# Patient Record
Sex: FEMALE | Race: WHITE | NOT HISPANIC OR LATINO | Employment: FULL TIME | URBAN - METROPOLITAN AREA
[De-identification: names, ages, dates, MRNs, and addresses within clinical notes are randomized per-mention and may not be internally consistent; named-entity substitution may affect disease eponyms.]

---

## 2017-01-01 ENCOUNTER — MYC REFILL (OUTPATIENT)
Dept: INTERNAL MEDICINE | Facility: CLINIC | Age: 42
End: 2017-01-01

## 2017-01-01 DIAGNOSIS — J30.9 ALLERGIC RHINITIS, UNSPECIFIED ALLERGIC RHINITIS TYPE: ICD-10-CM

## 2017-01-01 DIAGNOSIS — K21.9 ESOPHAGEAL REFLUX: ICD-10-CM

## 2017-01-03 NOTE — TELEPHONE ENCOUNTER
Message from HerbMorris:  Tina Dutta, RN Tue Candido 3, 2017 8:01 AM        ----- Message -----   From: Kim Solomon   Sent: 1/1/2017 12:40 PM   To: [facility] Nursing Staff-  Subject: Medication Renewal Request     Original authorizing provider: MD Kim Mauricio would like a refill of the following medications:  beclomethasone (QVAR) 80 MCG/ACT Inhaler [Carolina Milton MD]    Preferred pharmacy: [facility] [id number]    Comment:      Medication renewals requested in this message routed to other providers:  omeprazole (PRILOSEC) 20 MG capsule [Cookie Donnelly MD]

## 2017-01-03 NOTE — TELEPHONE ENCOUNTER
Message from [place]:  Original authorizing provider: Cookie Donnelly MD    Kim Solomon would like a refill of the following medications:  omeprazole (PRILOSEC) 20 MG capsule [Cookie Donnelly MD]    Preferred pharmacy: [facility] [address] [place], [place] - [address] [facility]    Comment:      Medication renewals requested in this message routed to other providers:  beclomethasone (QVAR) 80 MCG/ACT Inhaler [Carolina Milton MD]

## 2017-01-04 ENCOUNTER — TELEPHONE (OUTPATIENT)
Dept: INTERNAL MEDICINE | Facility: CLINIC | Age: 42
End: 2017-01-04

## 2017-01-09 NOTE — TELEPHONE ENCOUNTER
[facility] Prior Authorization Team   Phone: [phone]  Fax: [phone]      PA Initiation    Medication: beclomethasone (QVAR) 80 MCG/ACT Inhaler-  Insurance Company: [employer]   Fax Number: [phone]    Phone Number: [phone]  Pharmacy Filling the Rx: [id number] - [place], [place] - [address] [facility]  Filling Pharmacy Phone: [phone]  Filling Pharmacy Fax: [phone]  Start Date: 1/9/2017

## 2017-01-11 ENCOUNTER — TELEPHONE (OUTPATIENT)
Dept: INTERNAL MEDICINE | Facility: CLINIC | Age: 42
End: 2017-01-11

## 2017-01-11 DIAGNOSIS — K21.9 ESOPHAGEAL REFLUX: Primary | ICD-10-CM

## 2017-01-11 NOTE — TELEPHONE ENCOUNTER
----- Message from Corina Jaeger sent at 1/7/2017 12:30 PM CST -----  Pardeep Silveira!  We received a request to do a PA for pt's omeprazole 20 mg twice daily.  Is pt able to take 40 mg once daily?  Looks like her insurance will only pay for one pill a day.  If she needs to take the twice daily dosing we will need a clinical reason.  Thank you so much for your help!  Corina

## 2017-01-12 RX ORDER — OMEPRAZOLE 40 MG/1
40 CAPSULE, DELAYED RELEASE ORAL DAILY
Qty: 90 CAPSULE | Refills: 3 | Status: SHIPPED | OUTPATIENT
Start: 2017-01-12 | End: 2017-12-21

## 2017-01-17 NOTE — TELEPHONE ENCOUNTER
Prior Authorization Approval    Authorization Effective Date: 1/16/2017  Authorization Expiration Date:    Medication: beclomethasone (QVAR) 80 MCG/ACT Inhaler - APPROVED  Approved Dose/Quantity:   Reference #:     Insurance Company: [employer]   Expected CoPay: $44.00     CoPay Card Available:      Foundation Assistance Needed:    Which Pharmacy is filling the prescription (Not needed for infusion/clinic administered): [id number] [place], [place] - [address] [place] [address]    Pharmacy notified, left voice message for patient.

## 2017-01-22 ASSESSMENT — ACTIVITIES OF DAILY LIVING (ADL)
ARE_THERE_SMOKE_DETECTORS_IN_YOUR_HOME?: Y
DO_MEMBERS_OF_YOUR_HOUSEHOLD_USE_SUNSCREEN?: Y
DO_MEMBERS_OF_YOUR_HOUSEHOLD_USE_SAFETY_HELMETS?: N
DO_MEMBERS_OF_YOUR_HOUSEHOLD_WEAR_SEAT_BELTS?: Y
ARE_THERE_FIREARMS_IN_YOUR_HOME?: Y
ARE_THERE_CARBON_MONOXIDE_DETECTORS_IN_YOUR_HOME?: Y

## 2017-01-22 ASSESSMENT — ENCOUNTER SYMPTOMS
MUSCLE CRAMPS: 0
NECK PAIN: 0
MYALGIAS: 0
BACK PAIN: 0
ARTHRALGIAS: 1
STIFFNESS: 1
NAIL CHANGES: 0
JOINT SWELLING: 0
SKIN CHANGES: 0
POOR WOUND HEALING: 0
MUSCLE WEAKNESS: 1

## 2017-01-23 ENCOUNTER — OFFICE VISIT (OUTPATIENT)
Dept: INTERNAL MEDICINE | Facility: CLINIC | Age: 42
End: 2017-01-23

## 2017-01-23 VITALS
SYSTOLIC BLOOD PRESSURE: 126 MMHG | BODY MASS INDEX: 35.05 KG/M2 | DIASTOLIC BLOOD PRESSURE: 83 MMHG | HEART RATE: 65 BPM | TEMPERATURE: 98.2 F | HEIGHT: 66 IN | OXYGEN SATURATION: 98 % | WEIGHT: 218.1 LBS

## 2017-01-23 DIAGNOSIS — R73.01 IMPAIRED FASTING GLUCOSE: ICD-10-CM

## 2017-01-23 DIAGNOSIS — Z77.018 HEAVY METAL EXPOSURE: ICD-10-CM

## 2017-01-23 DIAGNOSIS — M77.11 LATERAL EPICONDYLITIS OF RIGHT ELBOW: ICD-10-CM

## 2017-01-23 DIAGNOSIS — E78.2 MIXED HYPERLIPIDEMIA: ICD-10-CM

## 2017-01-23 DIAGNOSIS — J45.30 MILD PERSISTENT ASTHMA WITHOUT COMPLICATION: ICD-10-CM

## 2017-01-23 DIAGNOSIS — Z00.00 ROUTINE GENERAL MEDICAL EXAMINATION AT A HEALTH CARE FACILITY: Primary | ICD-10-CM

## 2017-01-23 DIAGNOSIS — Z00.00 ROUTINE GENERAL MEDICAL EXAMINATION AT A HEALTH CARE FACILITY: ICD-10-CM

## 2017-01-23 LAB
CHOLEST SERPL-MCNC: 203 MG/DL
HBA1C MFR BLD: 5.4 % (ref 4.3–6)
HDLC SERPL-MCNC: 44 MG/DL
LDLC SERPL CALC-MCNC: 131 MG/DL
NONHDLC SERPL-MCNC: 158 MG/DL
TRIGL SERPL-MCNC: 138 MG/DL

## 2017-01-23 ASSESSMENT — PAIN SCALES - GENERAL: PAINLEVEL: SEVERE PAIN (6)

## 2017-01-23 NOTE — NURSING NOTE
Chief Complaint   Patient presents with     Physical     Patient here for annual physical and elbow pain     Germania Alvarez LPN at 7:27 AM on 1/23/2017.

## 2017-01-23 NOTE — PATIENT INSTRUCTIONS
[facility] Medication Refill Request Information:  * Please contact your pharmacy regarding ANY request for medication refills.  ** [facility] Prescription Fax = [phone]  * Please allow 3 business days for routine medication refills.  * Please allow 5 business days for controlled substance medication refills.     [facility] Test Result notification information:  *You will be notified with in 7-10 days of your appointment day regarding the results of your test.  If you are on MyChart you will be notified as soon as the provider has reviewed the results and signed off on them.

## 2017-01-23 NOTE — PROGRESS NOTES
Ms. Solomon is a 41 year old female here for a physical.    History of Present Illness:  Kim is generally well.  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  She is surprised that she likes it more than anticipated.  She did initially lose some weight last year but then regained some back when she fell off her diet.  She did also see the sleep doctor and had a madibular advancment device made which she finds helpful for both SAADIA and TMJ disorder.  Her PSG showed AHI 5.6 in 10/15.  She had a couple of orthopedic issues over the summer. Broke great toe over summer after stubbing it, saw Tria, had xrays, had to keep it protected, it is now feeling better.   Also end of December R elbow started bothering her, she saw Tria in January, felt she had tennis elbow, started wearing a brace.  She did slip on pool deck in December during scuba class.  No other clear triggers. In November joined [employer], walking only, no weights or other arm activities.  Has done icing and advil.  She did not have xrays.  She does have weakness and possible tingling in 1/5th digits.  Mood pretty good, still on zoloft and vyvanse.  Still taking asthma meds, stopped allergy shots.  She rarely has wheezing. Uses albuterol, rarely, before exercise.    Gyn, last pap in 2016, normal, no abnormal, does them at  [facility].    She is wondering if she can get heavy metals tested.  She apparently [place] near [place], which is cited for air polution/volatiles.  She denies new/concerning symptoms.      A full 10-pt Review of Systems was performed and is negative except as indicated in the HPI.    Answers for HPI/ROS submitted by the patient on 1/22/2017   Annual Exam:  Frequency of exercise:: 2-3 days/week  Duration of exercise:: 15-30 minutes  General Symptoms: No  Skin  Symptoms: Yes  HENT Symptoms: No  EYE SYMPTOMS: No  HEART SYMPTOMS: No  LUNG SYMPTOMS: No  INTESTINAL SYMPTOMS: No  URINARY SYMPTOMS: No  GYNECOLOGIC SYMPTOMS: No  BREAST SYMPTOMS: No  SKELETAL SYMPTOMS: Yes  BLOOD SYMPTOMS: No  NERVOUS SYSTEM SYMPTOMS: No  MENTAL HEALTH SYMPTOMS: No  Changes in hair: No  Changes in moles/birth marks: No  Itching: No  Rashes: No  Changes in nails: No  Acne: Yes  Hair in places you don't want it: No  Change in facial hair: No  Warts: No  Non-healing sores: No  Scarring: No  Flaking of skin: No  Color changes of hands/feet in cold : No  Sun sensitivity: No  Skin thickening: No  Back pain: No  Muscle aches: No  Neck pain: No  Swollen joints: No  Joint pain: Yes  Bone pain: No  Muscle cramps: No  Muscle weakness: Yes  Joint stiffness: Yes  Bone fracture: No      Past Medical History:  Past Medical History   Diagnosis Date     GERD (gastroesophageal reflux disease)      Asthma      Depression      ADD (attention deficit disorder)      on [facility]se, [facility]     SAADIA (obstructive sleep apnea) 10/15     mild, AHI  5.5     Obesity        Past Surgical History:  Past Surgical History   Procedure Laterality Date     Eye surgery       Endoscopic sinus surgery  6/21/2011     Procedure:ENDOSCOPIC SINUS SURGERY; Bilateral Endoscopic Sinus Surgery Anterior Ethmoidectomy; Surgeon:JOSELUIS MISHRA; [facility]     Antrostomy nasal  6/21/2011     Procedure:ANTROSTOMY NASAL; Bilateral Maxillary  Antrostomy; Surgeon:JOSELUIS MISHRA; [facility]     Laparoscopic cholecystectomy with cholangiograms  9/10/2012     Procedure: LAPAROSCOPIC CHOLECYSTECTOMY WITH CHOLANGIOGRAMS;  Laparoscopic Cholecystectomy,  ;  Surgeon: Juancho Jmi MD;  Location: [facility] OR       Active Meds:  Current Outpatient Prescriptions   Medication     omeprazole (PRILOSEC) 40 MG capsule     sertraline (ZOLOFT) 100 MG tablet     desonide (DESOWEN) 0.05 % cream     beclomethasone (QVAR) 80  "MCG/ACT Inhaler     azelastine (ASTELIN) 0.1 % nasal spray     EPINEPHrine (EPIPEN JR) 0.15 MG/0.3ML injection     Multiple Vitamin (MULTIVITAMIN) per tablet     lisdexamfetamine (VYVANSE) 20 MG capsule     cetirizine (ZYRTEC) 10 MG tablet     albuterol 90 MCG/ACT inhaler     montelukast (SINGULAIR) 10 MG tablet     No current facility-administered medications for this visit.        Allergies:  Diphtheria toxoid; Medrol; Orange fruit; Peanuts; Penicillins; and Prednisone    Family History:  family history includes Breast Cancer in her maternal grandmother; C.A.D. (age of onset: 69) in her mother; Heart Failure in her maternal grandmother; Hypertension in her mother; Parkinsonism in her father; Rheumatoid Arthritis in her mother.    Social History:  Social History   Substance Use Topics     Smoking status: Never Smoker      Smokeless tobacco: Never Used     Alcohol Use: No      Comment: occasionally       Physical Exam:  Vitals: /83 mmHg  Pulse 65  Temp(Src) 98.2  F (36.8  C) (Oral)  Ht 1.682 m (5' 6.22\")  Wt 98.93 kg (218 lb 1.6 oz)  BMI 34.97 kg/m2  SpO2 98%  Breastfeeding? No  Constitutional: Alert, oriented, pleasant, no acute distress, well-appearing  Head: Normocephalic, atraumatic  Eyes: Extra-ocular movements intact, pupils equally round and reactive bilaterally, no scleral icterus  ENT: Oropharynx clear, moist mucus membranes, good dentition, no tonsillar enlargement   Neck: Supple, no lymphadenopathy, no thyromegaly/nodules  Cardiovascular: Regular rate and rhythm, no murmurs, rubs or gallops, peripheral pulses full/symmetric  Respiratory: Good air movement bilaterally, lungs clear, no wheezes/rales/rhonchi  GI: Abd obese, soft, non-tender  Breast/Pelvic: deferred b/c does at outside gyn  Musculoskeletal: No edema, normal muscle tone, normal gait, pain over lat epichondyle, pain with wrist extension against resistence      Assessment and Plan:  Kim was seen today for physical.    Diagnoses " and all orders for this visit:    Routine general medical examination at a health care facility  -     Normal exam today    Mixed hyperlipidemia  -     Lipid panel reflex to direct LDL; Future    Impaired fasting glucose  -     Hemoglobin A1c; Future    Mild persistent asthma without complication: Well controlled without regular symptoms, okay for scuba (letter signed)    Lateral epicondylitis of right elbow: Advised more regular use of nsaids, icing.  Advised may need to see Tria for injection, but will have to check with allergist about shots prior to this given reported steroid allergy.    Heavy metal exposure: No symptoms, low risk       -     Blood heavy metal panel          #Routine Health Maintenence:  Immunizations (zoster, pneumovax, flu, Tdap, Hep A/B): flu shot 2016, pt allergic to diptheria vaccine  Lipids:   Recent Labs   Lab Test 04/02/09 1037    CHOL 233*    HDL 54    *    TRIG 171*    CHOLHDLRATIO 4.3     Colonoscopy (50-75 yrs): n/a  Dexa (>65W or 70M yrs): n/a  Mammogram (40-75 yrs): n/a  Pap (21-65 yrs): 4/16, NIL per patient, does paps/breast exams at outside MD  Tob/EtOH: screen neg  Depression: see above    Return to clinic: prn or 1 year    Cookie Donnelly MD  Internal Medicine

## 2017-01-25 LAB
ARSENIC BLD-MCNC: NORMAL UG/L
LEAD BLD-MCNC: NORMAL UG/DL
MERCURY BLD-MCNC: NORMAL NG/ML

## 2017-02-17 ENCOUNTER — OFFICE VISIT (OUTPATIENT)
Dept: INTERNAL MEDICINE | Facility: CLINIC | Age: 42
End: 2017-02-17

## 2017-02-17 VITALS
RESPIRATION RATE: 18 BRPM | OXYGEN SATURATION: 96 % | DIASTOLIC BLOOD PRESSURE: 76 MMHG | BODY MASS INDEX: 34.31 KG/M2 | HEART RATE: 74 BPM | TEMPERATURE: 98.4 F | WEIGHT: 214 LBS | SYSTOLIC BLOOD PRESSURE: 114 MMHG

## 2017-02-17 DIAGNOSIS — J01.90 ACUTE BACTERIAL SINUSITIS: Primary | ICD-10-CM

## 2017-02-17 DIAGNOSIS — B96.89 ACUTE BACTERIAL SINUSITIS: Primary | ICD-10-CM

## 2017-02-17 RX ORDER — DOXYCYCLINE 100 MG/1
100 CAPSULE ORAL 2 TIMES DAILY
Qty: 28 CAPSULE | Refills: 0 | Status: SHIPPED | OUTPATIENT
Start: 2017-02-17 | End: 2017-06-27

## 2017-02-17 ASSESSMENT — PAIN SCALES - GENERAL: PAINLEVEL: MODERATE PAIN (4)

## 2017-02-17 NOTE — NURSING NOTE
Chief Complaint   Patient presents with     Sinus Problem     Here for sinus infection for a couple of weeks. Patient went to [facility] and patient was prescribed doxycycline     Porfirio Hyman CMA at 4:31 PM on 2/17/2017

## 2017-02-17 NOTE — MR AVS SNAPSHOT
After Visit Summary   2/17/2017    Kim Solomon    MRN: [id number]           Patient Information     Date Of Birth          1975        Visit Information        Provider Department      2/17/2017 4:30 PM Isabela Calderon MD [facility]        Today's Diagnoses     Acute bacterial sinusitis    -  1      Care Instructions    Primary Care Center Medication Refill Request Information:  * Please contact your pharmacy regarding ANY request for medication refills.  ** [facility] Prescription Fax = [phone]  * Please allow 3 business days for routine medication refills.  * Please allow 5 business days for controlled substance medication refills.     [facility] Center Test Result notification information:  *You will be notified with in 7-10 days of your appointment day regarding the results of your test.  If you are on MyChart you will be notified as soon as the provider has reviewed the results and signed off on them.    [facility] [phone]           Follow-ups after your visit        Who to contact     Please call your clinic at [phone] to:    Ask questions about your health    Make or cancel appointments    Discuss your medicines    Learn about your test results    Speak to your doctor   If you have compliments or concerns about an experience at your clinic, or if you wish to file a complaint, please contact [facility] Patient Relations at [phone] or email us at [email or web address]         Additional Information About Your Visit        MyChart Information     [employer] gives you secure access to your electronic health record. If you see a primary care provider, you can also send messages to your care team and make appointments. If you have questions, please call your primary care clinic.  If you do not have a primary care provider, please call [phone] and they will assist you.      [employer] is an electronic gateway that  provides easy, online access to your medical records. With [email or web address], you can request a clinic appointment, read your test results, renew a prescription or communicate with your care team.     To access your existing account, please contact your [facility] or call [phone] for assistance.        Care EveryWhere ID     This is your Care EveryWhere ID. This could be used by other organizations to access your [facility] medical records  [id number]        Your Vitals Were     Pulse Temperature Respirations Last Period Pulse Oximetry Breastfeeding?    74 98.4  F (36.9  C) (Oral) 18 02/07/2017 (Approximate) 96% No    BMI (Body Mass Index)                   34.31 kg/m2            Blood Pressure from Last 3 Encounters:   02/17/17 114/76   01/23/17 126/83   06/16/16 116/78    Weight from Last 3 Encounters:   02/17/17 97.1 kg (214 lb)   01/23/17 98.9 kg (218 lb 1.6 oz)   06/16/16 93.7 kg (206 lb 9.6 oz)              Today, you had the following     No orders found for display         Today's Medication Changes          These changes are accurate as of: 2/17/17  5:00 PM.  If you have any questions, ask your nurse or doctor.               Start taking these medicines.        Dose/Directions    doxycycline 100 MG capsule   Commonly known as:  VIBRAMYCIN   Used for:  Acute bacterial sinusitis        Dose:  100 mg   Take 1 capsule (100 mg) by mouth 2 times daily   Quantity:  28 capsule   Refills:  0            Where to get your medicines      These medications were sent to [facility] [id number] [place], [place] - [address] [facility]  [address] [facility] [address]     Phone:  [phone]     doxycycline 100 MG capsule                Primary Care Provider Office Phone # Fax #    Cookie Donnelly [phone]       [address] [place] [address]        Thank you!     Thank you for choosing [facility]  for your care.  Our goal is always to provide you with excellent care. Hearing back from our patients is one way we can continue to improve our services. Please take a few minutes to complete the written survey that you may receive in the mail after your visit with us. Thank you!             Your Updated Medication List - Protect others around you: Learn how to safely use, store and throw away your medicines at www.disposemymeds.org.          This list is accurate as of: 2/17/17  5:00 PM.  Always use your most recent med list.                   Brand Name Dispense Instructions for use    albuterol 90 MCG/ACT inhaler      Inhale 2 puffs into the lungs every 6 hours as needed.       azelastine 0.1 % spray    ASTELIN     Spray 2 sprays into both nostrils 2 times daily       beclomethasone 80 MCG/ACT Inhaler    QVAR    1 Inhaler    Inhale 2 puffs into the lungs 2 times daily       desonide 0.05 % cream    DESOWEN    60 g    Apply topically 2 times daily For max 7-10 days or shortest time to improvement       doxycycline 100 MG capsule    VIBRAMYCIN    28 capsule    Take 1 capsule (100 mg) by mouth 2 times daily       EPINEPHrine 0.15 MG/0.3ML injection    EPIPEN JR     Inject 0.15 mg into the muscle as needed.       lisdexamfetamine 20 MG capsule    VYVANSE    30 capsule    Take 1 capsule by mouth daily.       multivitamin per tablet      Take 1 tablet by mouth daily.       omeprazole 40 MG capsule    priLOSEC    90 capsule    Take 1 capsule (40 mg) by mouth daily       sertraline 100 MG tablet    ZOLOFT     Take 100 mg by mouth daily       SINGULAIR 10 MG tablet   Generic drug:  montelukast      Take 10 mg by mouth At Bedtime.       ZYRTEC 10 MG tablet   Generic drug:  cetirizine      Take 10 mg by mouth daily.

## 2017-02-17 NOTE — PROGRESS NOTES
Chief complaint: Sinus infection  History of present illness: Kim is a 41-year-old woman with a past medical history of allergic rhinitis, sinusitis, s/p 2 surgeries, and asthma who presents with concerns about acute bacterial sinusitis.    Around January 25 she developed a sore throat and then lost her voice.  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  Over the next several days she developed sinus pressure on her cheek bones, and rhinorrhea productive of lots of green and yellow mucus. She went to the [facility] clinic near her home where she was treated with doxycycline 100 mg twice daily for 7 days and had an improvement in her voice, sinus drainage and sinus pain. However over the last Squaxin the symptoms have recurred. She has not had a fever or chills, is not short of breath and her asthma is not flaring. She continues to use her Zyrtec daily for  Chronic allergies.    She has been planning to go to [place] in 2 weeks and she wants to be well.  Past Medical History   Diagnosis Date     ADD (attention deficit disorder)      on vyvanse, prescribed by Joselyn Jenkins at [facility]     Asthma      Depression      GERD (gastroesophageal reflux disease)      Obesity      SAADIA (obstructive sleep apnea) 10/15     mild, AHI  5.5, has mandibular adv device     Past Surgical History   Procedure Laterality Date     Eye surgery       Endoscopic sinus surgery  6/21/2011     Procedure:ENDOSCOPIC SINUS SURGERY; Bilateral Endoscopic Sinus Surgery Anterior Ethmoidectomy; Surgeon:JOSELUIS MISHRA; [facility]     Antrostomy nasal  6/21/2011     Procedure:ANTROSTOMY NASAL; Bilateral Maxillary  Antrostomy; Surgeon:JOSELUIS MISHRA; [facility]     Laparoscopic cholecystectomy with cholangiograms  9/10/2012     Procedure: LAPAROSCOPIC CHOLECYSTECTOMY WITH CHOLANGIOGRAMS;  Laparoscopic Cholecystectomy,  ;  Surgeon: Juancho Jim MD;   Location: [facility] OR      ROS: 4 point ROS neg other than the symptoms noted above in the HPI.    /76 (BP Location: Left arm, Patient Position: Chair, Cuff Size: Adult Large)  Pulse 74  Temp 98.4  F (36.9  C) (Oral)  Resp 18  Wt 97.1 kg (214 lb)  LMP 02/07/2017 (Approximate)  SpO2 96%  Breastfeeding? No  BMI 34.31 kg/m2  Exam:  Constitutional: healthy, alert and no distress  Neck: Neck supple. No adenopathy. Thyroid symmetric, normal size,, Carotids without bruits.  ENT: nasal mucosa is red and swollen bilaterally. There are no ulcerations. There is left serous otitis media. The right ear is normal. Posterior pharynx is mildly erythematous without exudate or ulcer. There is no cervical or submandibular lymphadenopathy.  The maxillary sinuses are tender to palpation.  Respiratory: negative, Percussion normal. Good diaphragmatic excursion. Lungs clear    ASSESSMENT  Recurrent bacterial sinusitis in a 41-year-old woman with a history of the same. She is predisposed by chronic allergies and despite having had sinus surgery, asymptomatic currently. There is no evidence of an asthma flare    Recommendations: Start doxycycline 100 mg p.o. Twice daily for 14 days and continue taking Singulair, Zyrtec, and p.r.n. Asthma meds.  Also continue taking treatment for GERD.

## 2017-02-17 NOTE — PATIENT INSTRUCTIONS
[facility] Medication Refill Request Information:  * Please contact your pharmacy regarding ANY request for medication refills.  ** [facility] Prescription Fax = [phone]  * Please allow 3 business days for routine medication refills.  * Please allow 5 business days for controlled substance medication refills.     [facility] Test Result notification information:  *You will be notified with in 7-10 days of your appointment day regarding the results of your test.  If you are on MyChart you will be notified as soon as the provider has reviewed the results and signed off on them.    [facility] [phone]

## 2017-05-25 ENCOUNTER — TELEPHONE (OUTPATIENT)
Dept: SLEEP MEDICINE | Facility: CLINIC | Age: 42
End: 2017-05-25

## 2017-06-27 ENCOUNTER — OFFICE VISIT (OUTPATIENT)
Dept: SLEEP MEDICINE | Facility: CLINIC | Age: 42
End: 2017-06-27
Attending: INTERNAL MEDICINE
Payer: COMMERCIAL

## 2017-06-27 VITALS
WEIGHT: 229 LBS | BODY MASS INDEX: 36.8 KG/M2 | SYSTOLIC BLOOD PRESSURE: 127 MMHG | RESPIRATION RATE: 18 BRPM | OXYGEN SATURATION: 98 % | HEIGHT: 66 IN | DIASTOLIC BLOOD PRESSURE: 74 MMHG | HEART RATE: 71 BPM

## 2017-06-27 DIAGNOSIS — G47.19 EXCESSIVE DAYTIME SLEEPINESS: ICD-10-CM

## 2017-06-27 DIAGNOSIS — G47.33 OSA (OBSTRUCTIVE SLEEP APNEA): Primary | ICD-10-CM

## 2017-06-27 DIAGNOSIS — E66.9 OBESITY (BMI 30-39.9): ICD-10-CM

## 2017-06-27 PROCEDURE — 99211 OFF/OP EST MAY X REQ PHY/QHP: CPT | Mod: ZF

## 2017-06-27 NOTE — PROGRESS NOTES
CC: MAD use was better with allergy shots, chief complaint: Patient returns after stopping using MAD for mild obstructive sleep apnea with a positional component.    History of present illness patient is a schoolteacher and has a history of allergies to multiple allergens, sleeps with several dogs, and does struggle with rhinitis. She had a polysomnogram on 10/14/15 which revealed an AHI of 5.5 events per hour. There was no significant hypoxemia. The AHI while supine was elevated at 8.2 events per hour she did have good results initially with her MAD developed by Terra Madrigal DDS  However,  possibly coinciding with stopping her allergy shots, she developed an intolerance and some snoring on her MAD. She presents here today to discuss other alternatives.    We reviewed alternative therapies for obstructive sleep apnea. We also discussed treatment of her allergies. At this point in time she wishes to consider positional therapy for 3 weeks and might chart me regarding these results. She also wishes to work on weight loss. She is aware of the fact that this is mild disease but this significantly affects her quality of sleep as well as her bed partners quality of sleep. She is exceptionally sleepy at this time with an [place] Sleepiness Scale of 20/24. I have concerns that there may be other factors that may be playing a part in this degree of sleepiness, and dependent upon her response to positional therapy, would recommend sleep diaries down the road to validate total sleep time and schedule. We did talk about other possible contributors to fatigue which can include stress depression as well as chronic illnesses such as allergies joint pain etc. Should she return for follow-up,  it would be reasonable to do a fatigue severity scale    Allergies:    Allergies   Allergen Reactions     Diphtheria Toxoid Hives     Medrol [Methylprednisolone Sodium Succinate] Hives     Per allergy testing. Allergic to all oral  steroids. OK with topical and inhaled.     Orange Fruit      Peanuts [Nuts]      All legumes and related oils     Penicillins Hives     Prednisone Hives       Medications:    Current Outpatient Prescriptions   Medication Sig Dispense Refill     fluticasone (VERAMYST) 27.5 MCG/SPRAY spray Spray 2 sprays into both nostrils daily       omeprazole (PRILOSEC) 40 MG capsule Take 1 capsule (40 mg) by mouth daily 90 capsule 3     sertraline (ZOLOFT) 100 MG tablet Take 100 mg by mouth daily       desonide (DESOWEN) 0.05 % cream Apply topically 2 times daily For max 7-10 days or shortest time to improvement 60 g 1     beclomethasone (QVAR) 80 MCG/ACT Inhaler Inhale 2 puffs into the lungs 2 times daily 1 Inhaler 11     EPINEPHrine (EPIPEN JR) 0.15 MG/0.3ML injection Inject 0.15 mg into the muscle as needed.         lisdexamfetamine (VYVANSE) 20 MG capsule Take 1 capsule by mouth daily. 30 capsule 0     cetirizine (ZYRTEC) 10 MG tablet Take 10 mg by mouth daily.       albuterol 90 MCG/ACT inhaler Inhale 2 puffs into the lungs every 6 hours as needed.       montelukast (SINGULAIR) 10 MG tablet Take 10 mg by mouth At Bedtime.         Problem List:  Patient Active Problem List    Diagnosis Date Noted     Abdominal pain, unspecified abdominal location 10/09/2012     Per radiology order  Problem list name updated by automated process. Provider to review       S/P laparoscopic cholecystectomy 09/10/2012     Iron deficiency anemia 03/01/2012     Problem list name updated by automated process. 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 Provider to review          Past Medical/Surgical History:  Past Medical History:   Diagnosis Date     ADD (attention deficit disorder)     on vyvanse, prescribed by Joselyn Jenkins at [facility] services  "    Asthma      Depression      GERD (gastroesophageal reflux disease)      Obesity      SAADIA (obstructive sleep apnea) 10/15    mild, AHI  5.5, has mandibular adv device     Past Surgical History:   Procedure Laterality Date     ANTROSTOMY NASAL  6/21/2011    Procedure:ANTROSTOMY NASAL; Bilateral Maxillary  Antrostomy; Surgeon:JOSELUIS MISHRA; Location:[facility] OR     ENDOSCOPIC SINUS SURGERY  6/21/2011    Procedure:ENDOSCOPIC SINUS SURGERY; Bilateral Endoscopic Sinus Surgery Anterior Ethmoidectomy; Surgeon:JOSELUIS MISHRA; Location:[facility] OR     EYE SURGERY       LAPAROSCOPIC CHOLECYSTECTOMY WITH CHOLANGIOGRAMS  9/10/2012    Procedure: LAPAROSCOPIC CHOLECYSTECTOMY WITH CHOLANGIOGRAMS;  Laparoscopic Cholecystectomy,  ;  Surgeon: Juancho Jim MD;  Location: [facility] OR           Physical Examination:  Vitals: /74  Pulse 71  Resp 18  Ht 1.676 m (5' 6\")  Wt 103.9 kg (229 lb)  SpO2 98%  BMI 36.96 kg/m2  BMI= Body mass index is 36.96 kg/(m^2).         [place] Total Score 6/27/2017   Total score - [place] 16       GENERAL APPEARANCE: healthy, alert and mild distress    Assessment plan is my impression that Ms. Solomon would benefit from positional therapy with a call back to me in about 3 weeks' time. In the following plan of care has been developed:  1 snoring with mild primarily positional sleep apnea positional device for 3 weeks, might chart follow-up, consideration for possible CPAP at that time.   2.  obesity increase exercise frequency and reduce intake and remember  3 excessive daytime sleepiness further evaluation of this needs to be determined as to whether this is fatigue or sleepiness. If this is fatigue relating to her allergies than an alternative to the previous plan as necessary. Also depending upon her symptoms CPAP mean be difficult to manage with allergies. We'll discuss these measures dependent upon her response to positional therapy and if necessary a fatigue severity scale, and sleep diaries would be " helpful    Thank you for allowing me to participate in this kind woman's care time spent with patient 25 minutes of which greater than 50% was spent in counseling and education and coordination of care and dictation

## 2017-06-27 NOTE — PATIENT INSTRUCTIONS
"MY TREATMENT INFORMATION FOR SLEEP APNEA-  Kim Solomon    NP: Eileen Barajas [place]  [facility] :  [place]    MY CONTACT NUMBER:  [phone]  Positional my chart me after 3 wks-how are things going you and bedpartner.  Next step?      Am I having a sleep study at a sleep center?  Make sure you have an appointment for the study before you leave!    Am I having a home sleep study?  Watch this video:  [email or web address]    Frequently asked questions:  1. What is Obstructive Sleep Apnea (SAADIA)? SAADIA is the most common type of sleep apnea. Apnea means, \"without breath.\"  Apnea is most often caused by narrowing or collapse of the upper airway as muscles relax during sleep.   Almost everyone has occasional apneas. Most people with sleep apnea have had brief interruptions at night frequently for many years.  The severity of sleep apnea is related to how frequent and severe the events are.   2. What are the consequences of SAADIA? Symptoms include: feeling sleepy during the day, snoring loudly, gasping or stopping of breathing, trouble sleeping, and occasionally morning headaches or heartburn at night.  Sleepiness can be serious and even increase the risk of falling asleep while driving. Other health consequences may include development of high blood pressure and other cardiovascular disease in persons who are susceptible. Untreated SAADIA  can contribute to heart disease, stroke and diabetes.   3. What are the treatment options? In most situations, sleep apnea is a lifelong disease that must be managed with daily therapy. Medications are not effective for sleep apnea and surgery is generally not considered until other therapies have been tried. Your treatment is your choice . Continuous Positive Airway (CPAP) works right away and is the therapy that is effective in nearly everyone. An oral device to hold your jaw forward is usually the next most reliable " option. Other options include postioning devices (to keep you off your back), weight loss, and surgery including a tongue pacing device. There is more detail about some of these options below.    Important tips for using CPAP and similar devices   Know your equipment:  CPAP is continuous positive airway pressure that prevents obstructive sleep apnea by keeping the throat from collapsing while you are sleeping. In most cases, the device is  smart  and can slowly self-adjusts if your throat collapses and keeps a record every day of how well you are treated-this information is available to you and your care team.  BPAP is bilevel positive airway pressure that keeps your throat open and also assists each breath with a pressure boost to maintain adequate breathing.  Special kinds of BPAP are used in patients who have inadequate breathing from lung or heart disease. In most cases, the device is  smart  and can slowly self-adjusts to assist breathing. Like CPAP, the device keeps a record of how well you are treated.  Your mask is your connection to the device. You get to choose what feels most comfortable and the staff will help to make sure if fits. Here: are some examples of the different masks that are available:       Key points to remember on your journey with sleep apnea:  1. Sleep study.  PAP devices often need to be adjusted during a sleep study to show that they are effective and adjusted right.  2. Good tips to remember: Try wearing just the mask during a quiet time during the day so your body adapts to wearing it. A humidifier is recommended for comfort in most cases to prevent drying of your nose and throat. Allergy medication from your provider may help you if you are having nasal congestion.  3. Getting settled-in. It takes more than one night for most of us to get used to wearing a mask. Try wearing just the mask during a quiet time during the day so your body adapts to wearing it. A humidifier is  recommended for comfort in most cases. Our team will work with you carefully on the first day and will be in contact within 4 days and again at 2 and 4 weeks for advice and remote device adjustments. Your therapy is evaluated by the device each day.   4. Use it every night. The more you are able to sleep naturally for 7-8 hours, the more likely you will have good sleep and to prevent health risks or symptoms from sleep apnea. Even if you use it 4 hours it helps. Occasionally all of us are unable to use a medical therapy, in sleep apnea, it is not dangerous to miss one night.   5. Communicate. Call our skilled team on the number provided on the first day if your visit for problems that make it difficult to wear the device. Over 2 out of 3 patients can learn to wear the device long-term with help from our team. Remember to call our team or your sleep providers if you are unable to wear the device as we may have other solutions for those who cannot adapt to mask CPAP therapy. It is recommended that you sleep your sleep provider within the first 3 months and yearly after that if you are not having problems.   Take care of your equipment. Make sure you clean your mask and tubing using directions every day and that your filter and mask are replaced as recommended or if they are not working.     BESIDES CPAP, WHAT OTHER THERAPIES ARE THERE?    Positioning Device  Positioning devices are generally used when sleep apnea is mild and only occurs on your back.This example shows a pillow that straps around the waist. It may be appropriate for those whose sleep study shows milder sleep apnea that occurs primarily when lying flat on one's back. Preliminary studies have shown benefit but effectiveness at home may need to be verified by a home sleep test. These devices are generally not covered by medical insurance..  Positioning Device  Positioning devices are generally used when sleep apnea is mild and only occurs on your  back.This example shows a pillow that straps around the waist. It may be appropriate for those whose sleep study shows milder sleep apnea that occurs primarily when lying flat on one's back. Preliminary studies have shown benefit but effectiveness at home may need to be verified by a home sleep test. These devices are generally not covered by medical insurance.            [employer] or [employer]bay-slumber snore bump for snoring; or backpack w/ chest strap stuffed w/ pillow or t shirt 2 pockets sew in tennis balls    Oral Appliance  What is oral appliance therapy?  An oral appliance device fits on your teeth at night like a retainer used after having braces. The device is made by a specialized dentist and requires several visits over 1-2 months before a manufactured device is made to fit your teeth and is adjusted to prevent your sleep apnea. Once an oral device is working properly, snoring should be improved. A home sleep test may be recommended at that time if to determine whether the sleep apnea is adequately treated.       Some things to remember:  -Oral devices are often, but not always, covered by your medical insurance. Be sure to check with your insurance provider.   -If you are referred for oral therapy, you will be given a list of specialized dentists to consider or you may choose to visit the Web site of the American Academy of Dental Sleep Medicine  -Oral devices are less likely to work if you have severe sleep apnea or are extremely overweight.     More detailed information  An oral appliance is a small acrylic device that fits over the upper and lower teeth  (similar to a retainer or a mouth guard). This device slightly moves jaw forward, which moves the base of the tongue forward, opens the airway, improves breathing for effective treat snoring and obstructive sleep apnea in perhaps 7 out of 10 people .  The best working devices are custom-made by a dental device  after a mold is made of the teeth 1,  2, 3.  When is an oral appliance indicated?  Oral appliance therapy is recommended as a first-line treatment for patients with primary snoring, mild sleep apnea, and for patients with moderate sleep apnea who prefer appliance therapy to use of CPAP4, 5. Severity of sleep apnea is determined by sleep testing and is based on the number of respiratory events per hour of sleep.   How successful is oral appliance therapy?  The success rate of oral appliance therapy in patients with mild sleep apnea is 75-80% while in patients with moderate sleep apnea it is 50-70%. The chance of success in patients with severe sleep apnea is 40-50%. The research also shows that oral appliances have a beneficial effect on the cardiovascular health of SAADIA patients at the same magnitude as CPAP therapy7.  Oral appliances should be a second-line treatment in cases of severe sleep apnea, but if not completely successful then a combination therapy utilizing CPAP plus oral appliance therapy may be effective. Oral appliances tend to be effective in a broad range of patients although studies show that the patients who have the highest success are females, younger patients, those with milder disease, and less severe obesity. 3, 6.   Finding a dentist that practices dental sleep medicine  Specific training is available through the American Academy of Dental Sleep Medicine for dentists interested in working in the field of sleep. To find a dentist who is educated in the field of sleep and the use of oral appliances, near you, visit the Web site of the American Academy of Dental Sleep Medicine.    References  1. Kiki, et al. Objectively measured vs self-reported compliance during oral appliance therapy for sleep-disordered breathing. Chest 2013; 144(5): 4569-0494.  2. Kaylee et al. Objective measurement of compliance during oral appliance therapy for sleep-disordered breathing. Thorax 2013; 68(1): 91-96.  3. Aaron et al. Mandibular  advancement devices in 620 men and women with SAADIA and snoring: tolerability and predictors of treatment success. Chest 2004; 125: 0872-3044.  4. Renetta et al. Oral appliances for snoring and SAADIA: a review. Sleep 2006; 29: 244-262.  5. Johana et al. Oral appliance treatment for SAADIA: an update. J Clin Sleep Med 2014; 10(2): 215-227.  6. Carlos et al. Predictors of OSAH treatment outcome. J Dent Res 2007; 86: 9847-9806.      Weight Loss:    Weight loss is a long-term strategy that may improve sleep apnea in some patients.    Weight management is a personal decision.  If you are interested in exploring weight loss strategies, the following discussion covers the impact on weight loss on sleep apnea and the approaches that may be successful.    Weight loss decreases severity of sleep apnea in most people with obesity. For those with mild obesity who have developed snoring with weight gain, even 15-30 pound weight loss can improve and occasionally eliminate sleep apnea.  Structured and life-long dietary and health habits are necessary to lose weight and keep healthier weight levels.     Though there may be significant health benefits from weight loss, long-term weight loss is very difficult to achieve- studies show success with dietary management in less than 10% of people. In addition, substantial weight loss may require years of dietary control and may be difficult if patients have severe obesity. In these cases, surgical management may be considered.  Finally, older individuals who have tolerated obesity without health complications may be less likely to benefit from weight loss strategies.      [unfilled]    Surgery:    Surgery for obstructive sleep apnea is considered generally only when other therapies fail to work. Surgery may be discussed with you if you are having a difficult time tolerating CPAP and or when there is an abnormal structure that requires surgical correction.  Nose and throat surgeries  often enlarge the airway to prevent collapse.  Most of these surgeries create pain for 1-2 weeks and up to half of the most common surgeries are not effective throughout life.  You should carefully discuss the benefits and drawbacks to surgery with your sleep provider and surgeon to determine if it is the best solution for you.   More information  Surgery for SAADIA is directed at areas that are responsible for narrowing or complete obstruction of the airway during sleep.  There are a wide range of procedures available to enlarge and/or stabilize the airway to prevent blockage of breathing in the three major areas where it can occur: the palate, tongue, and nasal regions.  Successful surgical treatment depends on the accurate identification of the factors responsible for obstructive sleep apnea in each person.  A personalized approach is required because there is no single treatment that works well for everyone.  Because of anatomic variation, consultation with an examination by a sleep surgeon is a critical first step in determining what surgical options are best for each patient.  In some cases, examination during sedation may be recommended in order to guide the selection of procedures.  Patients will be counseled about risks and benefits as well as the typical recovery course after surgery. Surgery is typically not a cure for a person s SAADIA.  However, surgery will often significantly improve one s SAADIA severity (termed  success rate ).  Even in the absence of a cure, surgery will decrease the cardiovascular risk associated with OSA7; improve overall quality of life8 (sleepiness, functionality, sleep quality, etc).      Palate Procedures:  Patients with SAADIA often have narrowing of their airway in the region of their tonsils and uvula.  The goals of palate procedures are to widen the airway in this region as well as to help the tissues resist collapse.  Modern palate procedure techniques focus on tissue conservation  and soft tissue rearrangement, rather than tissue removal.  Often the uvula is preserved in this procedure. Residual sleep apnea is common in patient after pharyngoplasty with an average reduction in sleep apnea events of 33%2.      Tongue Procedures:  ExamWhile patients are awake, the muscles that surround the throat are active and keep this region open for breathing. These muscles relax during sleep, allowing the tongue and other structures to collapse and block breathing.  There are several different tongue procedures available.  Selection of a tongue base procedure depends on characteristics seen on physical exam.  Generally, procedures are aimed at removing bulky tissues in this area or preventing the back of the tongue from falling back during sleep.  Success rates for tongue surgery range from 50-62%3.    Hypoglossal Nerve Stimulation:  Hypoglossal nerve stimulation has recently received approval from the [place] Food and Drug Administration for the treatment of obstructive sleep apnea.  This is based on research showing that the system was safe and effective in treating sleep apnea6.  Results showed that the median AHI score decreased 68%, from 29.3 to 9.0. This therapy uses an implant system that senses breathing patterns and delivers mild stimulation to airway muscles, which keeps the airway open during sleep.  The system consists of three fully implanted components: a small generator (similar in size to a pacemaker), a breathing sensor, and a stimulation lead.  Using a small handheld remote, a patient turns the therapy on before bed and off upon awakening.    Candidates for this device must be greater than 22 years of age, have moderate to severe SAADIA (AHI between 20-65), BMI less than 32, have tried CPAP/oral appliance without success, and have appropriate upper airway anatomy (determined by a sleep endoscopy performed by Dr. More).    Hypoglossal Nerve Stimulation Pathway:    The sleep surgeon s  office will work with the patient through the insurance prior-authorization process (including communications and appeals).    Nasal Procedures:  Nasal obstruction can interfere with nasal breathing during the day and night.  Studies have shown that relief of nasal obstruction can improve the ability of some patients to tolerate positive airway pressure therapy for obstructive sleep apnea1.  Treatment options include medications such as nasal saline, topical corticosteroid and antihistamine sprays, and oral medications such as antihistamines or decongestants. Non-surgical treatments can include external nasal dilators for selected patients. If these are not successful by themselves, surgery can improve the nasal airway either alone or in combination with these other options.      Combination Procedures:  Combination of surgical procedures and other treatments may be recommended, particularly if patients have more than one area of narrowing or persistent positional disease.  The success rate of combination surgery ranges from 66-80%2,3.    References  1. Sidney GILL. The Role of the Nose in Snoring and Obstructive Sleep Apnoea: An Update.  Eur Arch Otorhinolaryngol. 2011; 268: 1365-73.  2.  Len SM; Vicenta JA; Licha JR; Pallanch JF; Malika MB; Lindy SG; Thony DEMPSEY. Surgical modifications of the upper airway for obstructive sleep apnea in adults: a systematic review and meta-analysis. SLEEP 2010;33(10):0180-5252. Arelis HERNANDEZ. Hypopharyngeal surgery in obstructive sleep apnea: an evidence-based medicine review.  Arch Otolaryngol Head Neck Surg. 2006 Feb;132(2):206-13.  3. Tony YH1, Christian Y, Juan Jose KURTIS. The efficacy of anatomically based multilevel surgery for obstructive sleep apnea. Otolaryngol Head Neck Surg. 2003 Oct;129(4):327-35.  4. Arelis HERNANDEZ, Goldberg A. Hypopharyngeal Surgery in Obstructive Sleep Apnea: An Evidence-Based Medicine Review. Arch Otolaryngol Head Neck Surg. 2006 Feb;132(2):206-13.  5. Valentin JAMA  et al. Upper-Airway Stimulation for Obstructive Sleep Apnea.  N Engl J Med. 2014 Jan 9;370(2):139-49.  6. Josephine Y et al. Increased Incidence of Cardiovascular Disease in Middle-aged Men with Obstructive Sleep Apnea. Am J Respir Crit Care Med; 2002 166: 159-165  7. Vel KAMARA et al. Studying Life Effects and Effectiveness of Palatopharyngoplasty (SLEEP) study: Subjective Outcomes of Isolated Uvulopalatopharyngoplasty. Otolaryngol Head Neck Surg. 2011; 144: 623-631.    My chart response to step up in allergy tx and use of positional device

## 2017-06-27 NOTE — MR AVS SNAPSHOT
"              After Visit Summary   6/27/2017    Kim Solomon    MRN: [id number]           Patient Information     Date Of Birth          1975        Visit Information        Provider Department      6/27/2017 4:00 PM Eileen Carballo APRN [facility], [place], Sleep Study        Care Instructions    MY TREATMENT INFORMATION FOR SLEEP APNEA-  Kim Solomon    NP: Eileen Carballo  SLEEP CENTER :  [facility]    MY CONTACT NUMBER:  [phone]  Positional my chart me after 3 wks-how are things going you and bedpartner.  Next step?      Am I having a sleep study at a sleep center?  Make sure you have an appointment for the study before you leave!    Am I having a home sleep study?  Watch this video:  [email or web address]/watch?v=[email or web address]&[email or web address]    Frequently asked questions:  1. What is Obstructive Sleep Apnea (SAADIA)? SAADIA is the most common type of sleep apnea. Apnea means, \"without breath.\"  Apnea is most often caused by narrowing or collapse of the upper airway as muscles relax during sleep.   Almost everyone has occasional apneas. Most people with sleep apnea have had brief interruptions at night frequently for many years.  The severity of sleep apnea is related to how frequent and severe the events are.   2. What are the consequences of SAADIA? Symptoms include: feeling sleepy during the day, snoring loudly, gasping or stopping of breathing, trouble sleeping, and occasionally morning headaches or heartburn at night.  Sleepiness can be serious and even increase the risk of falling asleep while driving. Other health consequences may include development of high blood pressure and other cardiovascular disease in persons who are susceptible. Untreated SAADIA  can contribute to heart disease, stroke and diabetes.   3. What are the treatment options? In most situations, sleep apnea is a lifelong disease that must be managed with daily therapy. Medications are not effective " for sleep apnea and surgery is generally not considered until other therapies have been tried. Your treatment is your choice . Continuous Positive Airway (CPAP) works right away and is the therapy that is effective in nearly everyone. An oral device to hold your jaw forward is usually the next most reliable option. Other options include postioning devices (to keep you off your back), weight loss, and surgery including a tongue pacing device. There is more detail about some of these options below.    Important tips for using CPAP and similar devices   Know your equipment:  CPAP is continuous positive airway pressure that prevents obstructive sleep apnea by keeping the throat from collapsing while you are sleeping. In most cases, the device is  smart  and can slowly self-adjusts if your throat collapses and keeps a record every day of how well you are treated-this information is available to you and your care team.  BPAP is bilevel positive airway pressure that keeps your throat open and also assists each breath with a pressure boost to maintain adequate breathing.  Special kinds of BPAP are used in patients who have inadequate breathing from lung or heart disease. In most cases, the device is  smart  and can slowly self-adjusts to assist breathing. Like CPAP, the device keeps a record of how well you are treated.  Your mask is your connection to the device. You get to choose what feels most comfortable and the staff will help to make sure if fits. Here: are some examples of the different masks that are available:       Key points to remember on your journey with sleep apnea:  1. Sleep study.  PAP devices often need to be adjusted during a sleep study to show that they are effective and adjusted right.  2. Good tips to remember: Try wearing just the mask during a quiet time during the day so your body adapts to wearing it. A humidifier is recommended for comfort in most cases to prevent drying of your nose and throat.  Allergy medication from your provider may help you if you are having nasal congestion.  3. Getting settled-in. It takes more than one night for most of us to get used to wearing a mask. Try wearing just the mask during a quiet time during the day so your body adapts to wearing it. A humidifier is recommended for comfort in most cases. Our team will work with you carefully on the first day and will be in contact within 4 days and again at 2 and 4 weeks for advice and remote device adjustments. Your therapy is evaluated by the device each day.   4. Use it every night. The more you are able to sleep naturally for 7-8 hours, the more likely you will have good sleep and to prevent health risks or symptoms from sleep apnea. Even if you use it 4 hours it helps. Occasionally all of us are unable to use a medical therapy, in sleep apnea, it is not dangerous to miss one night.   5. Communicate. Call our skilled team on the number provided on the first day if your visit for problems that make it difficult to wear the device. Over 2 out of 3 patients can learn to wear the device long-term with help from our team. Remember to call our team or your sleep providers if you are unable to wear the device as we may have other solutions for those who cannot adapt to mask CPAP therapy. It is recommended that you sleep your sleep provider within the first 3 months and yearly after that if you are not having problems.   Take care of your equipment. Make sure you clean your mask and tubing using directions every day and that your filter and mask are replaced as recommended or if they are not working.     BESIDES CPAP, WHAT OTHER THERAPIES ARE THERE?    Positioning Device  Positioning devices are generally used when sleep apnea is mild and only occurs on your back.This example shows a pillow that straps around the waist. It may be appropriate for those whose sleep study shows milder sleep apnea that occurs primarily when lying flat on one's  back. Preliminary studies have shown benefit but effectiveness at home may need to be verified by a home sleep test. These devices are generally not covered by medical insurance..  Positioning Device  Positioning devices are generally used when sleep apnea is mild and only occurs on your back.This example shows a pillow that straps around the waist. It may be appropriate for those whose sleep study shows milder sleep apnea that occurs primarily when lying flat on one's back. Preliminary studies have shown benefit but effectiveness at home may need to be verified by a home sleep test. These devices are generally not covered by medical insurance.            Amazon or ebay-slumber snore bump for snoring; or backpack w/ chest strap stuffed w/ pillow or t shirt 2 pockets sew in tennis balls    Oral Appliance  What is oral appliance therapy?  An oral appliance device fits on your teeth at night like a retainer used after having braces. The device is made by a specialized dentist and requires several visits over 1-2 months before a manufactured device is made to fit your teeth and is adjusted to prevent your sleep apnea. Once an oral device is working properly, snoring should be improved. A home sleep test may be recommended at that time if to determine whether the sleep apnea is adequately treated.       Some things to remember:  -Oral devices are often, but not always, covered by your medical insurance. Be sure to check with your insurance provider.   -If you are referred for oral therapy, you will be given a list of specialized dentists to consider or you may choose to visit the Web site of the American Academy of Dental Sleep Medicine  -Oral devices are less likely to work if you have severe sleep apnea or are extremely overweight.     More detailed information  An oral appliance is a small acrylic device that fits over the upper and lower teeth  (similar to a retainer or a mouth guard). This device slightly moves jaw  forward, which moves the base of the tongue forward, opens the airway, improves breathing for effective treat snoring and obstructive sleep apnea in perhaps 7 out of 10 people .  The best working devices are custom-made by a dental device  after a mold is made of the teeth 1, 2, 3.  When is an oral appliance indicated?  Oral appliance therapy is recommended as a first-line treatment for patients with primary snoring, mild sleep apnea, and for patients with moderate sleep apnea who prefer appliance therapy to use of CPAP4, 5. Severity of sleep apnea is determined by sleep testing and is based on the number of respiratory events per hour of sleep.   How successful is oral appliance therapy?  The success rate of oral appliance therapy in patients with mild sleep apnea is 75-80% while in patients with moderate sleep apnea it is 50-70%. The chance of success in patients with severe sleep apnea is 40-50%. The research also shows that oral appliances have a beneficial effect on the cardiovascular health of SAADIA patients at the same magnitude as CPAP therapy7.  Oral appliances should be a second-line treatment in cases of severe sleep apnea, but if not completely successful then a combination therapy utilizing CPAP plus oral appliance therapy may be effective. Oral appliances tend to be effective in a broad range of patients although studies show that the patients who have the highest success are females, younger patients, those with milder disease, and less severe obesity. 3, 6.   Finding a dentist that practices dental sleep medicine  Specific training is available through the American Academy of Dental Sleep Medicine for dentists interested in working in the field of sleep. To find a dentist who is educated in the field of sleep and the use of oral appliances, near you, visit the Web site of the American Academy of Dental Sleep Medicine.    References  1. clarke Swanson al. Objectively measured vs  self-reported compliance during oral appliance therapy for sleep-disordered breathing. Chest 2013; 144(5): 1698-3976.  2. Kaylee, et al. Objective measurement of compliance during oral appliance therapy for sleep-disordered breathing. Thorax 2013; 68(1): 91-96.  3. Aaron et al. Mandibular advancement devices in 620 men and women with SAADIA and snoring: tolerability and predictors of treatment success. Chest 2004; 125: 5295-3880.  4. Renetta et al. Oral appliances for snoring and SAADIA: a review. Sleep 2006; 29: 244-262.  5. Johana et al. Oral appliance treatment for SAADIA: an update. J Clin Sleep Med 2014; 10(2): 215-227.  6. Carlos et al. Predictors of OSAH treatment outcome. J Dent Res 2007; 86: 4923-0425.      Weight Loss:    Weight loss is a long-term strategy that may improve sleep apnea in some patients.    Weight management is a personal decision.  If you are interested in exploring weight loss strategies, the following discussion covers the impact on weight loss on sleep apnea and the approaches that may be successful.    Weight loss decreases severity of sleep apnea in most people with obesity. For those with mild obesity who have developed snoring with weight gain, even 15-30 pound weight loss can improve and occasionally eliminate sleep apnea.  Structured and life-long dietary and health habits are necessary to lose weight and keep healthier weight levels.     Though there may be significant health benefits from weight loss, long-term weight loss is very difficult to achieve- studies show success with dietary management in less than 10% of people. In addition, substantial weight loss may require years of dietary control and may be difficult if patients have severe obesity. In these cases, surgical management may be considered.  Finally, older individuals who have tolerated obesity without health complications may be less likely to benefit from weight loss strategies.       [unfilled]    Surgery:    Surgery for obstructive sleep apnea is considered generally only when other therapies fail to work. Surgery may be discussed with you if you are having a difficult time tolerating CPAP and or when there is an abnormal structure that requires surgical correction.  Nose and throat surgeries often enlarge the airway to prevent collapse.  Most of these surgeries create pain for 1-2 weeks and up to half of the most common surgeries are not effective throughout life.  You should carefully discuss the benefits and drawbacks to surgery with your sleep provider and surgeon to determine if it is the best solution for you.   More information  Surgery for SAADIA is directed at areas that are responsible for narrowing or complete obstruction of the airway during sleep.  There are a wide range of procedures available to enlarge and/or stabilize the airway to prevent blockage of breathing in the three major areas where it can occur: the palate, tongue, and nasal regions.  Successful surgical treatment depends on the accurate identification of the factors responsible for obstructive sleep apnea in each person.  A personalized approach is required because there is no single treatment that works well for everyone.  Because of anatomic variation, consultation with an examination by a sleep surgeon is a critical first step in determining what surgical options are best for each patient.  In some cases, examination during sedation may be recommended in order to guide the selection of procedures.  Patients will be counseled about risks and benefits as well as the typical recovery course after surgery. Surgery is typically not a cure for a person s SAADIA.  However, surgery will often significantly improve one s SAADIA severity (termed  success rate ).  Even in the absence of a cure, surgery will decrease the cardiovascular risk associated with OSA7; improve overall quality of life8 (sleepiness, functionality, sleep  quality, etc).      Palate Procedures:  Patients with SAADIA often have narrowing of their airway in the region of their tonsils and uvula.  The goals of palate procedures are to widen the airway in this region as well as to help the tissues resist collapse.  Modern palate procedure techniques focus on tissue conservation and soft tissue rearrangement, rather than tissue removal.  Often the uvula is preserved in this procedure. Residual sleep apnea is common in patient after pharyngoplasty with an average reduction in sleep apnea events of 33%2.      Tongue Procedures:  ExamWhile patients are awake, the muscles that surround the throat are active and keep this region open for breathing. These muscles relax during sleep, allowing the tongue and other structures to collapse and block breathing.  There are several different tongue procedures available.  Selection of a tongue base procedure depends on characteristics seen on physical exam.  Generally, procedures are aimed at removing bulky tissues in this area or preventing the back of the tongue from falling back during sleep.  Success rates for tongue surgery range from 50-62%3.    Hypoglossal Nerve Stimulation:  Hypoglossal nerve stimulation has recently received approval from the [place] Food and Drug Administration for the treatment of obstructive sleep apnea.  This is based on research showing that the system was safe and effective in treating sleep apnea6.  Results showed that the median AHI score decreased 68%, from 29.3 to 9.0. This therapy uses an implant system that senses breathing patterns and delivers mild stimulation to airway muscles, which keeps the airway open during sleep.  The system consists of three fully implanted components: a small generator (similar in size to a pacemaker), a breathing sensor, and a stimulation lead.  Using a small handheld remote, a patient turns the therapy on before bed and off upon awakening.    Candidates for this  device must be greater than 22 years of age, have moderate to severe SAADIA (AHI between 20-65), BMI less than 32, have tried CPAP/oral appliance without success, and have appropriate upper airway anatomy (determined by a sleep endoscopy performed by Dr. More).    Hypoglossal Nerve Stimulation Pathway:    The sleep surgeon s office will work with the patient through the insurance prior-authorization process (including communications and appeals).    Nasal Procedures:  Nasal obstruction can interfere with nasal breathing during the day and night.  Studies have shown that relief of nasal obstruction can improve the ability of some patients to tolerate positive airway pressure therapy for obstructive sleep apnea1.  Treatment options include medications such as nasal saline, topical corticosteroid and antihistamine sprays, and oral medications such as antihistamines or decongestants. Non-surgical treatments can include external nasal dilators for selected patients. If these are not successful by themselves, surgery can improve the nasal airway either alone or in combination with these other options.      Combination Procedures:  Combination of surgical procedures and other treatments may be recommended, particularly if patients have more than one area of narrowing or persistent positional disease.  The success rate of combination surgery ranges from 66-80%2,3.    References  1. Sidney GILL. The Role of the Nose in Snoring and Obstructive Sleep Apnoea: An Update.  Eur Arch Otorhinolaryngol. 2011; 268: 1365-73.  2.  Len SM; Vicenta JA; Licha JR; Pallanch JF; Malika MB; Lindy SG; Thony LEALD. Surgical modifications of the upper airway for obstructive sleep apnea in adults: a systematic review and meta-analysis. SLEEP 2010;33(10):2704-0312. Arelis HERNANDEZ. Hypopharyngeal surgery in obstructive sleep apnea: an evidence-based medicine review.  Arch Otolaryngol Head Neck Surg. 2006 Feb;132(2):206-13.  3. Tony CARRILLO, Juan Jose Duncan  KURTIS. The efficacy of anatomically based multilevel surgery for obstructive sleep apnea. Otolaryngol Head Neck Surg. 2003 Oct;129(4):327-35.  4. Arelis HERNANDEZ, Goldberg A. Hypopharyngeal Surgery in Obstructive Sleep Apnea: An Evidence-Based Medicine Review. Arch Otolaryngol Head Neck Surg. 2006 Feb;132(2):206-13.  5. Valentin PJ et al. Upper-Airway Stimulation for Obstructive Sleep Apnea.  N Engl J Med. 2014 Jan 9;370(2):139-49.  6. Josephine Y et al. Increased Incidence of Cardiovascular Disease in Middle-aged Men with Obstructive Sleep Apnea. Am J Respir Crit Care Med; 2002 166: 159-165  7. Vel KAMARA et al. Studying Life Effects and Effectiveness of Palatopharyngoplasty (SLEEP) study: Subjective Outcomes of Isolated Uvulopalatopharyngoplasty. Otolaryngol Head Neck Surg. 2011; 144: 623-631.                      Follow-ups after your visit        Who to contact     If you have questions or need follow up information about today's clinic visit or your schedule please contact [facility], [place], SLEEP STUDY directly at [phone].  Normal or non-critical lab and imaging results will be communicated to you by [employer], letter or phone within 4 business days after the clinic has received the results. If you do not hear from us within 7 days, please contact the clinic through [employer] or phone. If you have a critical or abnormal lab result, we will notify you by phone as soon as possible.  Submit refill requests through [employer] or call your pharmacy and they will forward the refill request to us. Please allow 3 business days for your refill to be completed.          Additional Information About Your Visit        [employer] Information     [employer] gives you secure access to your electronic health record. If you see a primary care provider, you can also send messages to your care team and make appointments. If you have questions, please call your primary care clinic.  If you do not have a primary care provider, please call [phone] and  "they will assist you.        Care EveryWhere ID     This is your Care EveryWhere ID. This could be used by other organizations to access your [facility] medical records  [id number]        Your Vitals Were     Pulse Respirations Height Pulse Oximetry BMI (Body Mass Index)       71 18 1.676 m (5' 6\") 98% 36.96 kg/m2        Blood Pressure from Last 3 Encounters:   06/27/17 127/74   02/17/17 114/76   01/23/17 126/83    Weight from Last 3 Encounters:   06/27/17 103.9 kg (229 lb)   02/17/17 97.1 kg (214 lb)   01/23/17 98.9 kg (218 lb 1.6 oz)              Today, you had the following     No orders found for display       Primary Care Provider Office Phone # Fax #    Cookie Donnelly [phone]       [facility] [address]  [facility] [address]        Equal Access to Services     JASPAL BRUSH : Hadii aad ku hadasho Soomaali, waaxda luqadaha, qaybta kaalmada adeegyada, waxay idiin haycarlosn nuno lux . So [facility] [phone].    ATENCIÓN: Si habla español, tiene a oneill disposición servicios gratuitos de asistencia lingüística. Simoname al [phone].    We comply with applicable federal civil rights laws and [place] laws. We do not discriminate on the basis of race, color, national origin, age, disability sex, sexual orientation or gender identity.            Thank you!     Thank you for choosing [facility], SLEEP STUDY  for your care. Our goal is always to provide you with excellent care. Hearing back from our patients is one way we can continue to improve our services. Please take a few minutes to complete the written survey that you may receive in the mail after your visit with us. Thank you!             Your Updated Medication List - Protect others around you: Learn how to safely use, store and throw away your medicines at www.disposemymeds.org.          This list is accurate as of: 6/27/17  4:59 PM.  Always use your most recent med list.                   Brand Name " Dispense Instructions for use Diagnosis    albuterol 90 MCG/ACT inhaler      Inhale 2 puffs into the lungs every 6 hours as needed.        beclomethasone 80 MCG/ACT Inhaler    QVAR    1 Inhaler    Inhale 2 puffs into the lungs 2 times daily    Allergic rhinitis, unspecified allergic rhinitis type       desonide 0.05 % cream    DESOWEN    60 g    Apply topically 2 times daily For max 7-10 days or shortest time to improvement    Intrinsic eczema       EPINEPHrine 0.15 MG/0.3ML injection    EPIPEN JR     Inject 0.15 mg into the muscle as needed.        fluticasone 27.5 MCG/SPRAY spray    VERAMYST     Spray 2 sprays into both nostrils daily        lisdexamfetamine 20 MG capsule    VYVANSE    30 capsule    Take 1 capsule by mouth daily.    ADHD (attention deficit hyperactivity disorder)       omeprazole 40 MG capsule    priLOSEC    90 capsule    Take 1 capsule (40 mg) by mouth daily    Esophageal reflux       sertraline 100 MG tablet    ZOLOFT     Take 100 mg by mouth daily        SINGULAIR 10 MG tablet   Generic drug:  montelukast      Take 10 mg by mouth At Bedtime.        ZYRTEC 10 MG tablet   Generic drug:  cetirizine      Take 10 mg by mouth daily.

## 2017-07-03 PROBLEM — G47.33 OSA (OBSTRUCTIVE SLEEP APNEA): Status: ACTIVE | Noted: 2017-07-03

## 2017-07-27 NOTE — PROGRESS NOTES
Provider:  Eileen Carballo, nurse practitioner   Patient:  Saskia Solomon  MRN:  [id number]  :  1975    DATE OF VISIT:  2017    LOCATION:  [facility]    CHIEF COMPLAINT:  Ms. Solomon returns to clinic to determine if other options are available for treatment for mild obstructive sleep apnea.    HISTORY OF PRESENT ILLNESS:  Ms. Solomon is a 41-year-old female who has had loud snoring and witnessed apneas and snort arousals paralleling awakening.  She is status post sinus surgery.  Has significant allergies and currently is treating them with Flonase and nonsedating antihistamine.  Sleep has been affecting her concentration at work.      A sleep study done on 10/14/2015 showed [place] Sleepiness Scale at that time of 20, her BMI was 36.3, weight 226 pounds.  This study was done without the use of a sleep aid.  Total sleep time 459 minutes, sleep latency was low-normal at 8.3 minutes, REM latency was short at 24.5 minutes, arousal index was normal at 11.6, sleep efficiency 95.6, time REM supine was 65.5.  Her AHI was 5.5, the REM AHI was 6.5, and supine AHI was 8.2.  Snoring was reported as occasional.  No significant PLMs, excessive transient sustained muscle activity during REM, and abnormal related behaviors.  Bruxism was occasional.  It was suggested that she consider a mouth guard, which she did, and did see  _____.  Initially responded quite well with mandibular advancement device, with a reduction in [place] Sleepiness Scale to a level of 5/24.  With return to clinic today and lack of response overall to her mandibular advancement device, she indicates that she would like to consider another alternative.  She reports initially some improvement.  She also did work on her numerous allergies.  Did have allergy shots for a time, but they were difficult to maintain.  Usually winter is a bit better than spring, and she reports this year mid-May her allergies did become significantly  worse.  Usually coincides with cottonwood release.  I did do a brief exam to see if there have been any changes in her presentation.  Once again, she complains of bed partner reporting snoring and apneas and she is unaware of any other data.  The mandibular advancement device did seem to work initially but then did seem to become quite difficult to manage, somewhat painful.  She did say she followed up with  _____ but after a time decided to stop.  We reviewed all modalities of treatment and their effectiveness, and based on the effective rates and review of her polysomnogram results, the following plan of care has been developed:      1.  Mild obstructive sleep apnea.  We discussed the fact that, in the setting of mild obstructive sleep apnea, a repeat study to see if she may have a worsened presentation at this point in time at this point in the year may eliminate her from possible therapy due to the fact that in the setting of mild sleep apnea one can test positive one day and negative the next.  To that end, based on her previous studies, she wishes to use positional therapy, developing a positional device, and seeing how that will work for her.  She also wishes to work on weight reduction as indicated below and continue with her rigorous allergy treatment.  She will contact me via [employer] in about 2 to 3 months' time as to response.  Other differential could include hypersomnia and/or nonrestorative sleep.  However, the polysomnogram with its low arousal rate does indicate that a likely finding of nonrestorative sleep would be abnormal.  Hypersomnia seems to come and go with the change of her allergy seasons, and perhaps she may benefit from an ENT referral as well if nasal airflow at night gets exceptionally difficult.  She does not awaken from any of her difficulties at night, just feels nonrestored both in the morning and then, of course, worsening in the late afternoon.  She also may have a long sleep  syndrome, of which perhaps she is not obtaining enough sleep, usually getting at least 7 hours per night.  She feels her circadian rhythm is aligned and she has never been a night owl.    2.  Obesity.  Working with [employer] and has joined a community center and is stepping up her exercise routine.  3.  Rhinitis.  May benefit from a consultation to [facility] _____.  She certainly has an unusual septal presentation status post her nasal surgery.    4.  We will review her response to positional therapy and make a determination at the next step with a MyChart communication.      TIME SPENT WITH PATIENT:  40 minutes, of which greater than 50% was spent in counseling, education and coordination of care.

## 2017-09-07 DIAGNOSIS — G47.33 OSA (OBSTRUCTIVE SLEEP APNEA): Primary | ICD-10-CM

## 2017-09-12 ENCOUNTER — DOCUMENTATION ONLY (OUTPATIENT)
Dept: SLEEP MEDICINE | Facility: CLINIC | Age: 42
End: 2017-09-12
Attending: INTERNAL MEDICINE
Payer: COMMERCIAL

## 2017-09-12 NOTE — PROGRESS NOTES
Patient was offered choice of vendor and chose Replaced by [facility].  Patient Kim Solomon was set up at [place] on September 12, 2017. Patient received a Resmed AirSense 10 Auto. Pressures were set at 5-15 cm H2O.   Patient s ramp is 5 cm H2O for Auto and FLEX/EPR is EPR, 2.  Patient received a Resmed Mask name: airfit N20 for her  Nasal mask Size For Her, Small, heated tubing and heated humidifier.  Patient is enrolled in the STM Program and does not need to meet compliance. Patient has a follow up on tbd with Eileen Tiwari NP.    Crystal Marie

## 2017-09-15 ENCOUNTER — DOCUMENTATION ONLY (OUTPATIENT)
Dept: SLEEP MEDICINE | Facility: CLINIC | Age: 42
End: 2017-09-15

## 2017-09-15 NOTE — PROGRESS NOTES
3 DAY STM VISIT    Patient contacted for 3 day STM visit  Message left for patient to return call     Device type: Auto-CPAP  PAP settings: CPAP min 5 cm  H20     CPAP max 15 cm  H20       Assessment: Nightly usage over four hours.  Action plan: Pt to have f/u 14 day STM visit.  Diagnostic AHI: 5.5

## 2017-09-27 ENCOUNTER — DOCUMENTATION ONLY (OUTPATIENT)
Dept: SLEEP MEDICINE | Facility: CLINIC | Age: 42
End: 2017-09-27

## 2017-09-27 NOTE — PROGRESS NOTES
14 DAY STM VISIT    Subjective measures:  Pt states things are going well and has no issues or complaints.  Pt is benefiting from therapy.     Assessment: Pt meeting objective benchmarks.  Patient meeting subjective benchmarks.   Action plan: pt to have 30 day STM visit.    Device type: Auto-CPAP  PAP settings: CPAP min 5 cm  H20     CPAP max 15 cm  H20    95th% pressure 8.1 cm  H20   Objective measures: 14 day rolling measures      Compliance  92 %      Leak  9.17 lpm  last  upload      AHI 1.1   last  upload      Average number of minutes 441        Objective measure goal  Compliance   Goal >70%  Leak   Goal < 24 lpm  AHI  Goal < 5  Usage  Goal >240

## 2017-10-13 ENCOUNTER — DOCUMENTATION ONLY (OUTPATIENT)
Dept: SLEEP MEDICINE | Facility: CLINIC | Age: 42
End: 2017-10-13

## 2017-10-13 NOTE — PROGRESS NOTES
30 DAY [facility] VISIT    Message left for patient to return call     Assessment: Pt meeting objective benchmarks.     Action plan: waiting for patient to return call.  and pt to have 6 month [facility] visit  Patient needs to schedule a follow up visit with Eileen Tiwari NP .   Device type: Auto-CPAP  PAP settings: CPAP min 5 cm  H20     CPAP max 15 cm  H20    95th% pressure 7.4 cm  H20   Objective measures: 14 day rolling measures      Compliance  92 %      Leak  8.06 lpm  last  upload      AHI 0.9   last  upload      Average number of minutes 425        Objective measure goal  Compliance   Goal >70%  Leak   Goal < 24 lpm  AHI  Goal < 5  Usage  Goal >240

## 2017-12-21 DIAGNOSIS — K21.9 ESOPHAGEAL REFLUX: ICD-10-CM

## 2017-12-23 RX ORDER — OMEPRAZOLE 40 MG/1
40 CAPSULE, DELAYED RELEASE ORAL DAILY
Qty: 90 CAPSULE | Refills: 0 | Status: SHIPPED | OUTPATIENT
Start: 2017-12-23 | End: 2018-03-23

## 2018-03-23 DIAGNOSIS — K21.9 ESOPHAGEAL REFLUX: ICD-10-CM

## 2018-03-23 RX ORDER — OMEPRAZOLE 40 MG/1
40 CAPSULE, DELAYED RELEASE ORAL DAILY
Qty: 30 CAPSULE | Refills: 0 | Status: SHIPPED | OUTPATIENT
Start: 2018-03-23 | End: 2018-04-21

## 2018-03-23 NOTE — TELEPHONE ENCOUNTER
omeprazole (PRILOSEC) 40 MG capsule   Last Written Prescription Date:  12/23/17  Last Fill Quantity: 90,   # refills: 0  Last Office Visit : 2/17/17  Future Office visit:  None    Scheduling has been notified to contact the pt for appointment.

## 2018-04-21 DIAGNOSIS — K21.9 ESOPHAGEAL REFLUX: ICD-10-CM

## 2018-04-23 NOTE — TELEPHONE ENCOUNTER
omeprazole (PRILOSEC) 40 MG capsule    Last Written Prescription Date:  3/23/18  Last Fill Quantity 30  # refills: 0  Last Office Visit : 2/17/17  Future Office visit: none    Routing  Because: no f/u appt. Pt was contacted for appt last rf.  rf or refuse?

## 2018-04-24 RX ORDER — OMEPRAZOLE 40 MG/1
40 CAPSULE, DELAYED RELEASE ORAL DAILY
Qty: 15 CAPSULE | Refills: 0 | Status: SHIPPED | OUTPATIENT
Start: 2018-04-24 | End: 2018-09-09

## 2018-09-09 ENCOUNTER — MYC REFILL (OUTPATIENT)
Dept: INTERNAL MEDICINE | Facility: CLINIC | Age: 43
End: 2018-09-09

## 2018-09-09 DIAGNOSIS — K21.9 ESOPHAGEAL REFLUX: ICD-10-CM

## 2018-09-10 RX ORDER — OMEPRAZOLE 40 MG/1
40 CAPSULE, DELAYED RELEASE ORAL DAILY
Qty: 10 CAPSULE | Refills: 0 | Status: SHIPPED | OUTPATIENT
Start: 2018-09-10 | End: 2018-09-19

## 2018-09-10 NOTE — TELEPHONE ENCOUNTER
omeprazole (PRILOSEC) 40 MG capsule   Last Written Prescription Date:  4/24/18  Last Fill Quantity: 15,   # refills: 0  Last Office Visit : 2/17/17  Future Office visit:  9/19/18

## 2018-09-10 NOTE — TELEPHONE ENCOUNTER
Message from [place]:  Fani Salcido RN Mon Sep 10, 2018 9:53 AM        ----- Message -----   From: Kim Solomon   Sent: 9/9/2018 9:09 PM   To: [facility] Nursing Staff-  Subject: Medication Renewal Request     Original authorizing provider: MD Kim Soria would like a refill of the following medications:  omeprazole (PRILOSEC) 40 MG capsule [Cookie Donnelly MD]    Preferred pharmacy: [facility] [id number] [place] [address]    Comment:

## 2018-09-14 ASSESSMENT — ENCOUNTER SYMPTOMS
DECREASED LIBIDO: 0
SINUS PAIN: 1
SORE THROAT: 0
TROUBLE SWALLOWING: 0
HOT FLASHES: 0
TASTE DISTURBANCE: 0
HOARSE VOICE: 0
SINUS CONGESTION: 1
NECK MASS: 0
SMELL DISTURBANCE: 0

## 2018-09-19 ENCOUNTER — OFFICE VISIT (OUTPATIENT)
Dept: INTERNAL MEDICINE | Facility: CLINIC | Age: 43
End: 2018-09-19
Payer: COMMERCIAL

## 2018-09-19 VITALS
OXYGEN SATURATION: 98 % | BODY MASS INDEX: 39.23 KG/M2 | HEART RATE: 76 BPM | DIASTOLIC BLOOD PRESSURE: 81 MMHG | HEIGHT: 66 IN | WEIGHT: 244.1 LBS | SYSTOLIC BLOOD PRESSURE: 120 MMHG

## 2018-09-19 DIAGNOSIS — Z00.00 ROUTINE GENERAL MEDICAL EXAMINATION AT A HEALTH CARE FACILITY: Primary | ICD-10-CM

## 2018-09-19 DIAGNOSIS — K21.9 GASTROESOPHAGEAL REFLUX DISEASE, ESOPHAGITIS PRESENCE NOT SPECIFIED: ICD-10-CM

## 2018-09-19 RX ORDER — OMEPRAZOLE 40 MG/1
40 CAPSULE, DELAYED RELEASE ORAL DAILY
Qty: 90 CAPSULE | Refills: 3 | Status: SHIPPED | OUTPATIENT
Start: 2018-09-19 | End: 2019-09-23

## 2018-09-19 ASSESSMENT — PAIN SCALES - GENERAL: PAINLEVEL: NO PAIN (0)

## 2018-09-19 NOTE — MR AVS SNAPSHOT
After Visit Summary   9/19/2018    Kim Solomon    MRN: [id number]           Patient Information     Date Of Birth          1975        Visit Information        Provider Department      9/19/2018 5:30 PM Annette Marlow MD [facility]        Today's Diagnoses     Routine general medical examination at a health care facility    -  1    Gastroesophageal reflux disease, esophagitis presence not specified          Care Instructions    Primary Care Center Medication Refill Request Information:  * Please contact your pharmacy regarding ANY request for medication refills.  ** [facility] Prescription Fax = [phone]  * Please allow 3 business days for routine medication refills.  * Please allow 5 business days for controlled substance medication refills.     Primary Care Center Test Result notification information:  *You will be notified with in 7-10 days of your appointment day regarding the results of your test.  If you are on MyChart you will be notified as soon as the provider has reviewed the results and signed off on them.    [facility]: [phone]             Follow-ups after your visit        Who to contact     Please call your clinic at [phone] to:    Ask questions about your health    Make or cancel appointments    Discuss your medicines    Learn about your test results    Speak to your doctor            Additional Information About Your Visit        MyChart Information     [employer] gives you secure access to your electronic health record. If you see a primary care provider, you can also send messages to your care team and make appointments. If you have questions, please call your primary care clinic.  If you do not have a primary care provider, please call [phone] and they will assist you.      [employer] is an electronic gateway that provides easy, online access to your medical records. With [employer], you can request a clinic appointment, read your test  "results, renew a prescription or communicate with your care team.     To access your existing account, please contact your [facility] or call [phone] for assistance.        Care EveryWhere ID     This is your Care EveryWhere ID. This could be used by other organizations to access your [place] medical records  [id number]        Your Vitals Were     Pulse Height Last Period Pulse Oximetry BMI (Body Mass Index)       76 1.67 m (5' 5.75\") (Within Weeks) 98% 39.7 kg/m2        Blood Pressure from Last 3 Encounters:   09/19/18 120/81   06/27/17 127/74   02/17/17 114/76    Weight from Last 3 Encounters:   09/19/18 110.7 kg (244 lb 1.6 oz)   06/27/17 103.9 kg (229 lb)   02/17/17 97.1 kg (214 lb)                 Where to get your medicines      These medications were sent to [place] [address] [place] [facility] [place], [place] - [address] [facility]  [address] [facility] [address]     Phone:  [phone]     omeprazole 40 MG capsule          Primary Care Provider Office Phone # Fax #    Cookie Donnelly [phone]       [address] [address]        Equal Access to Services     JASPAL BRUSH : Hadii kacie granto Somichael, waaxda luqadaha, qaybta kaalmada adeegyada, simona felton. So [facility] [phone].    ATENCIÓN: Si habla español, tiene a oneill disposición servicios gratuitos de asistencia lingüística. [facility] [phone].    We comply with applicable federal civil rights laws and [place] laws. We do not discriminate on the basis of race, color, national origin, age, disability, sex, sexual orientation, or gender identity.            Thank you!     Thank you for choosing [facility]  for your care. Our goal is always to provide you with excellent care. Hearing back from our patients is one way we can continue to improve our services. Please take a few minutes to complete the written survey that " you may receive in the mail after your visit with us. Thank you!             Your Updated Medication List - Protect others around you: Learn how to safely use, store and throw away your medicines at www.disposemymeds.org.          This list is accurate as of 9/19/18 11:59 PM.  Always use your most recent med list.                   Brand Name Dispense Instructions for use Diagnosis    albuterol 90 MCG/ACT inhaler      Inhale 2 puffs into the lungs every 6 hours as needed.        beclomethasone 80 MCG/ACT Inhaler    QVAR    1 Inhaler    Inhale 2 puffs into the lungs 2 times daily    Allergic rhinitis, unspecified allergic rhinitis type       desonide 0.05 % cream    DESOWEN    60 g    Apply topically 2 times daily For max 7-10 days or shortest time to improvement    Intrinsic eczema       EPINEPHrine 0.15 MG/0.3ML injection    EPIPEN JR     Inject 0.15 mg into the muscle as needed.        fluticasone 27.5 MCG/SPRAY spray    VERAMYST     Spray 2 sprays into both nostrils daily        lisdexamfetamine 20 MG capsule    VYVANSE    30 capsule    Take 1 capsule by mouth daily.    ADHD (attention deficit hyperactivity disorder)       omeprazole 40 MG capsule    priLOSEC    90 capsule    Take 1 capsule (40 mg) by mouth daily    Gastroesophageal reflux disease, esophagitis presence not specified, Routine general medical examination at a health care facility       sertraline 100 MG tablet    ZOLOFT     Take 100 mg by mouth daily        SINGULAIR 10 MG tablet   Generic drug:  montelukast      Take 10 mg by mouth At Bedtime.        ZYRTEC 10 MG tablet   Generic drug:  cetirizine      Take 10 mg by mouth daily.

## 2018-09-19 NOTE — NURSING NOTE
Chief Complaint   Patient presents with     Physical     Pt here for physical, also wants to know more about congestive heart failure        Marvin Kee, EMT 5:31 PM on 9/19/2018.

## 2018-09-19 NOTE — PATIENT INSTRUCTIONS
[facility] Medication Refill Request Information:  * Please contact your pharmacy regarding ANY request for medication refills.  ** [facility] Prescription Fax = [phone]  * Please allow 3 business days for routine medication refills.  * Please allow 5 business days for controlled substance medication refills.     [facility] Test Result notification information:  *You will be notified with in 7-10 days of your appointment day regarding the results of your test.  If you are on MyChart you will be notified as soon as the provider has reviewed the results and signed off on them.    [facility]: [phone]

## 2018-09-20 NOTE — PROGRESS NOTES
HPI:  This 43-year-old woman comes in today for a physical.  She is concerned because her mother was recently diagnosed with congestive heart failure and she has a maternal grandmother also with congestive heart failure.  She would like to be checked and see whether there is anything she could do to improve her risk for congestive heart failure.  Her mother has a history of an MI and stent placement 6 years ago, atrial fibrillation, and a CVA 4 years ago.      The patient is not a smoker.  She has no history of hypertension or diabetes.  She did have a cholesterol and LDL of 131 in 01/2017.      Past medical history is significant for GERD on omeprazole, obstructive sleep apnea on CPAP, asthma, and depression on sertraline.  Of note, she abruptly discontinued her Sertraline 100 mg 2 weeks ago because her prescription ran out, but she has had no symptoms.      She has gained nearly 40 pounds in the last 2 years.  Her weight is 244 today and was 206 in 06/2016.  She attributes this to a number of stressors, poor diet, and lack of exercise.  She works as a .     ROS:  Constitutional: no fevers, night sweats or unintentional weight change   Eyes: no vision change, diplopia or red eyes   Ears, Nose, Mouth, Throat: no tinnitus or hearing change, no oral lesions, throat clear   Cardiovascular: no chest pain, palpitations,orthopnea or PND   Respiratory: no dyspnea, cough, shortness of breath or wheezing   GI: no nausea, vomiting, diarrhea or constipation, no abdominal pain   : no change in urine, no dysuria or hematuria   Musculoskeletal: no joint or muscle pain or swelling     Patient Active Problem List   Diagnosis     Allergic rhinitis     Iron deficiency anemia     Contact dermatitis and other eczema, due to unspecified cause     Undifferentiated inflammatory arthritis (H)     S/P laparoscopic cholecystectomy     Abdominal pain, unspecified abdominal location     SAADIA (obstructive sleep  apnea)     Past Medical History:   Diagnosis Date     ADD (attention deficit disorder)     on vyvanse, prescribed by Joselyn Jenkins at [facility] services     Asthma      Depression      GERD (gastroesophageal reflux disease)      Obesity      SAADIA (obstructive sleep apnea) 10/15    mild, AHI  5.5, has mandibular adv device     Past Surgical History:   Procedure Laterality Date     ANTROSTOMY NASAL  6/21/2011    Procedure:ANTROSTOMY NASAL; Bilateral Maxillary  Antrostomy; Surgeon:JOSELUIS MISHRA; Location:[facility] OR     ENDOSCOPIC SINUS SURGERY  6/21/2011    Procedure:ENDOSCOPIC SINUS SURGERY; Bilateral Endoscopic Sinus Surgery Anterior Ethmoidectomy; Surgeon:JOSELUIS MISHRA; Location: OR     EYE SURGERY       LAPAROSCOPIC CHOLECYSTECTOMY WITH CHOLANGIOGRAMS  9/10/2012    Procedure: LAPAROSCOPIC CHOLECYSTECTOMY WITH CHOLANGIOGRAMS;  Laparoscopic Cholecystectomy,  ;  Surgeon: Juancho Jim MD;  Location:  OR     Social History   Substance Use Topics     Smoking status: Never Smoker     Smokeless tobacco: Never Used     Alcohol use No      Comment: occasionally     Family History   Problem Relation Age of Onset     Heart Failure Maternal Grandmother      Breast Cancer Maternal Grandmother      Rheumatoid Arthritis Mother      Hypertension Mother      C.A.D. Mother 69     Heart Failure Mother      Parkinsonism Father      Allergies   Allergen Reactions     Diphtheria Toxoid Hives     Medrol [Methylprednisolone Sodium Succinate] Hives     Per allergy testing. Allergic to all oral steroids. OK with topical and inhaled.     Orange Fruit      Peanuts [Nuts]      All legumes and related oils     Penicillins Hives     Prednisone Hives     Current Outpatient Prescriptions   Medication Sig Dispense Refill     albuterol 90 MCG/ACT inhaler Inhale 2 puffs into the lungs every 6 hours as needed.       beclomethasone (QVAR) 80 MCG/ACT Inhaler Inhale 2 puffs into the lungs 2 times daily 1 Inhaler 11     cetirizine (ZYRTEC) 10 MG  "tablet Take 10 mg by mouth daily.       EPINEPHrine (EPIPEN JR) 0.15 MG/0.3ML injection Inject 0.15 mg into the muscle as needed.         fluticasone (VERAMYST) 27.5 MCG/SPRAY spray Spray 2 sprays into both nostrils daily       lisdexamfetamine (VYVANSE) 20 MG capsule Take 1 capsule by mouth daily. 30 capsule 0     montelukast (SINGULAIR) 10 MG tablet Take 10 mg by mouth At Bedtime.       omeprazole (PRILOSEC) 40 MG capsule Take 1 capsule (40 mg) by mouth daily 90 capsule 3     sertraline (ZOLOFT) 100 MG tablet Take 100 mg by mouth daily       desonide (DESOWEN) 0.05 % cream Apply topically 2 times daily For max 7-10 days or shortest time to improvement (Patient not taking: Reported on 9/19/2018) 60 g 1     /81 (BP Location: Right arm, Patient Position: Sitting, Cuff Size: Adult Regular)  Pulse 76  Ht 1.67 m (5' 5.75\")  Wt 110.7 kg (244 lb 1.6 oz)  LMP  (Within Weeks)  SpO2 98%  BMI 39.7 kg/m2    PHYSICAL EXAM:  Constitutional: no distress, comfortable, pleasant, obese  Eyes: anicteric, normal extra-ocular movements   Ears, Nose and Throat: throat clear, neck supple with full range of motion, no thyromegaly.   Cardiovascular: regular rate and rhythm, normal S1 and S2, no murmurs, rubs or gallops  Respiratory: clear to auscultation, no wheezes or crackles, normal breath sounds   Gastrointestinal: positive bowel sounds, nontender, no hepatosplenomegaly, no masses   Musculoskeletal: full range of motion, no edema   Psychological: appropriate mood   Lymphatic: no cervical lymphadenopathy    A/P:    1.  History of congestive heart failure in mother and grandmother.  We had an extended discussion about cardiovascular risk factors.  She should have her LDL repeated as it was somewhat elevated in the past.  We also discussed the importance of weight loss.  We will check an A1C, but this has been normal in the past and her blood pressure is normal.   2.  GERD.  Stable on PPI.   3.  Obstructive sleep apnea.  Doing " well on CPAP.   4.  Asthma.  Stable.   5.  Depression, doing well off of sertraline.  She does have an appointment with her therapist coming up.  I did warn her that in general it is not a good idea to stop this medication abruptly.     Total time spent 25 minutes.  More than 50% of the time spent with Ms. Solomon on counseling / coordinating her care.          Answers for HPI/ROS submitted by the patient on 9/14/2018   General Symptoms: No  Skin Symptoms: No  HENT Symptoms: Yes  EYE SYMPTOMS: No  HEART SYMPTOMS: No  LUNG SYMPTOMS: No  INTESTINAL SYMPTOMS: No  URINARY SYMPTOMS: No  GYNECOLOGIC SYMPTOMS: Yes  BREAST SYMPTOMS: No  SKELETAL SYMPTOMS: No  BLOOD SYMPTOMS: No  NERVOUS SYSTEM SYMPTOMS: No  MENTAL HEALTH SYMPTOMS: No  Ear pain: Yes  Ear discharge: No  Hearing loss: No  Tinnitus: Yes  Nosebleeds: No  Congestion: Yes  Sinus pain: Yes  Trouble swallowing: No   Voice hoarseness: No  Mouth sores: No  Sore throat: No  Tooth pain: No  Gum tenderness: No  Bleeding gums: No  Change in taste: No  Change in sense of smell: No  Dry mouth: No  Hearing aid used: No  Neck lump: No  Bleeding or spotting between periods: No  Heavy or painful periods: Yes  Irregular periods: Yes  Vaginal discharge: Yes  Hot flashes: No  Vaginal dryness: No  Genital ulcers: No  Reduced libido: No  Painful intercourse: No  Difficulty with sexual arousal: No  Post-menopausal bleeding: No

## 2018-10-02 DIAGNOSIS — G47.33 OSA (OBSTRUCTIVE SLEEP APNEA): Primary | ICD-10-CM

## 2018-10-03 DIAGNOSIS — K21.9 GASTROESOPHAGEAL REFLUX DISEASE, ESOPHAGITIS PRESENCE NOT SPECIFIED: ICD-10-CM

## 2018-10-03 DIAGNOSIS — Z00.00 ROUTINE GENERAL MEDICAL EXAMINATION AT A HEALTH CARE FACILITY: ICD-10-CM

## 2018-10-03 LAB
ALBUMIN SERPL-MCNC: 3.8 G/DL (ref 3.4–5)
ALP SERPL-CCNC: 66 U/L (ref 40–150)
ALT SERPL W P-5'-P-CCNC: 40 U/L (ref 0–50)
ANION GAP SERPL CALCULATED.3IONS-SCNC: 6 MMOL/L (ref 3–14)
AST SERPL W P-5'-P-CCNC: 23 U/L (ref 0–45)
BILIRUB SERPL-MCNC: 0.7 MG/DL (ref 0.2–1.3)
BUN SERPL-MCNC: 15 MG/DL (ref 7–30)
CALCIUM SERPL-MCNC: 8.4 MG/DL (ref 8.5–10.1)
CHLORIDE SERPL-SCNC: 105 MMOL/L (ref 94–109)
CHOLEST SERPL-MCNC: 176 MG/DL
CO2 SERPL-SCNC: 28 MMOL/L (ref 20–32)
CREAT SERPL-MCNC: 0.79 MG/DL (ref 0.52–1.04)
ERYTHROCYTE [DISTWIDTH] IN BLOOD BY AUTOMATED COUNT: 17.3 % (ref 10–15)
GFR SERPL CREATININE-BSD FRML MDRD: 80 ML/MIN/1.7M2
GLUCOSE SERPL-MCNC: 101 MG/DL (ref 70–99)
HBA1C MFR BLD: 6 % (ref 0–5.6)
HCT VFR BLD AUTO: 35.3 % (ref 35–47)
HDLC SERPL-MCNC: 39 MG/DL
HGB BLD-MCNC: 10.5 G/DL (ref 11.7–15.7)
LDLC SERPL CALC-MCNC: 109 MG/DL
MCH RBC QN AUTO: 22.2 PG (ref 26.5–33)
MCHC RBC AUTO-ENTMCNC: 29.7 G/DL (ref 31.5–36.5)
MCV RBC AUTO: 75 FL (ref 78–100)
NONHDLC SERPL-MCNC: 137 MG/DL
PLATELET # BLD AUTO: 256 10E9/L (ref 150–450)
POTASSIUM SERPL-SCNC: 4.1 MMOL/L (ref 3.4–5.3)
PROT SERPL-MCNC: 7.9 G/DL (ref 6.8–8.8)
RBC # BLD AUTO: 4.73 10E12/L (ref 3.8–5.2)
SODIUM SERPL-SCNC: 138 MMOL/L (ref 133–144)
TRIGL SERPL-MCNC: 143 MG/DL
WBC # BLD AUTO: 7.6 10E9/L (ref 4–11)

## 2018-10-07 DIAGNOSIS — R73.03 PRE-DIABETES: ICD-10-CM

## 2018-10-07 DIAGNOSIS — D50.9 MICROCYTIC ANEMIA: Primary | ICD-10-CM

## 2018-10-29 DIAGNOSIS — D50.9 MICROCYTIC ANEMIA: ICD-10-CM

## 2018-10-29 LAB
FERRITIN SERPL-MCNC: 25 NG/ML (ref 12–150)
IRON SATN MFR SERPL: 6 % (ref 15–46)
IRON SERPL-MCNC: 21 UG/DL (ref 35–180)
TIBC SERPL-MCNC: 333 UG/DL (ref 240–430)

## 2018-11-07 ENCOUNTER — OFFICE VISIT (OUTPATIENT)
Dept: INTERNAL MEDICINE | Facility: CLINIC | Age: 43
End: 2018-11-07
Payer: COMMERCIAL

## 2018-11-07 VITALS
HEART RATE: 66 BPM | BODY MASS INDEX: 39.96 KG/M2 | WEIGHT: 245.7 LBS | DIASTOLIC BLOOD PRESSURE: 82 MMHG | SYSTOLIC BLOOD PRESSURE: 143 MMHG | RESPIRATION RATE: 16 BRPM

## 2018-11-07 DIAGNOSIS — D50.9 IRON DEFICIENCY ANEMIA, UNSPECIFIED IRON DEFICIENCY ANEMIA TYPE: ICD-10-CM

## 2018-11-07 DIAGNOSIS — E66.3 OVERWEIGHT: Primary | ICD-10-CM

## 2018-11-07 DIAGNOSIS — R73.03 PREDIABETES: ICD-10-CM

## 2018-11-07 ASSESSMENT — PAIN SCALES - GENERAL: PAINLEVEL: NO PAIN (0)

## 2018-11-07 NOTE — MR AVS SNAPSHOT
After Visit Summary   11/7/2018    Kim Solomon    MRN: [id number]           Patient Information     Date Of Birth          1975        Visit Information        Provider Department      11/7/2018 6:00 PM Annette Marlow MD [facility]        Today's Diagnoses     Overweight    -  1    Iron deficiency anemia, unspecified iron deficiency anemia type        Prediabetes           Follow-ups after your visit        Additional Services     NUTRITION REFERRAL       Your provider has referred you to: nutrition services [facility]    Please be aware that coverage of these services is subject to the terms and limitations of your health insurance plan.  Call member services at your health plan with any benefit or coverage questions.      Please bring the following with you to your appointment:    (1) This referral request  (2) Any documents given to you regarding this referral  (3) Any specific questions you have about diet and/or food choices                  Who to contact     Please call your clinic at [phone] to:    Ask questions about your health    Make or cancel appointments    Discuss your medicines    Learn about your test results    Speak to your doctor            Additional Information About Your Visit        [employer] Information     [employer] gives you secure access to your electronic health record. If you see a primary care provider, you can also send messages to your care team and make appointments. If you have questions, please call your primary care clinic.  If you do not have a primary care provider, please call [phone] and they will assist you.      [employer] is an electronic gateway that provides easy, online access to your medical records. With [employer], you can request a clinic appointment, read your test results, renew a prescription or communicate with your care team.     To access your existing account, please contact your [facility] or  call [phone] for assistance.        Care EveryWhere ID     This is your Care EveryWhere ID. This could be used by other organizations to access your [place] medical records  [id number]        Your Vitals Were     Pulse Respirations Breastfeeding? BMI (Body Mass Index)          66 16 No 39.96 kg/m2         Blood Pressure from Last 3 Encounters:   11/07/18 143/82   09/19/18 120/81   06/27/17 127/74    Weight from Last 3 Encounters:   11/07/18 111.4 kg (245 lb 11.2 oz)   09/19/18 110.7 kg (244 lb 1.6 oz)   06/27/17 103.9 kg (229 lb)              We Performed the Following     NUTRITION REFERRAL        Primary Care Provider Office Phone # Fax #    Cookie Donnelly [phone] [address] [address]        Equal Access to Services     [facility]: Hadii kacie granto Somichael, waaxda luqadaha, qaybta kaalmada adeegyada, simona lux . So [facility] [phone].    ATENCIÓN: Si habla español, tiene a oneill disposición servicios gratuitos de asistencia lingüística. Llame al [phone].    We comply with applicable federal civil rights laws and [place] laws. We do not discriminate on the basis of race, color, national origin, age, disability, sex, sexual orientation, or gender identity.            Thank you!     Thank you for choosing [facility]  for your care. Our goal is always to provide you with excellent care. Hearing back from our patients is one way we can continue to improve our services. Please take a few minutes to complete the written survey that you may receive in the mail after your visit with us. Thank you!             Your Updated Medication List - Protect others around you: Learn how to safely use, store and throw away your medicines at www.disposemymeds.org.          This list is accurate as of 11/7/18 11:59 PM.  Always use your most recent med list.                   Brand Name Dispense Instructions for  use Diagnosis    albuterol 90 MCG/ACT inhaler      Inhale 2 puffs into the lungs every 6 hours as needed.        beclomethasone 80 MCG/ACT Aers IS A DISCONTINUED MEDICATION    QVAR    1 Inhaler    Inhale 2 puffs into the lungs 2 times daily    Allergic rhinitis, unspecified allergic rhinitis type       desonide 0.05 % cream    DESOWEN    60 g    Apply topically 2 times daily For max 7-10 days or shortest time to improvement    Intrinsic eczema       EPINEPHrine 0.15 MG/0.3ML injection    EPIPEN JR     Inject 0.15 mg into the muscle as needed.        fluticasone 27.5 MCG/SPRAY spray    VERAMYST     Spray 2 sprays into both nostrils daily        lisdexamfetamine 20 MG capsule    VYVANSE    30 capsule    Take 1 capsule by mouth daily.    ADHD (attention deficit hyperactivity disorder)       omeprazole 40 MG capsule    priLOSEC    90 capsule    Take 1 capsule (40 mg) by mouth daily    Gastroesophageal reflux disease, esophagitis presence not specified, Routine general medical examination at a health care facility       sertraline 100 MG tablet    ZOLOFT     Take 100 mg by mouth daily        SINGULAIR 10 MG tablet   Generic drug:  montelukast      Take 10 mg by mouth At Bedtime.        ZYRTEC 10 MG tablet   Generic drug:  cetirizine      Take 10 mg by mouth daily.

## 2018-11-08 NOTE — PROGRESS NOTES
SUBJECTIVE:  This 43-year-old woman comes in today to follow up labs from a visit that I had with her 09/19/2018.  At that time, 2 things were noted.  First she had a hemoglobin of 10.5 with an MCV of 75.  Followup ferritin was just completed 10/29 and was 25, iron and iron binding were 21 and 6%, all suggesting iron deficiency anemia.  She does have periods every 26 days, they used to last 5 days but now last about 7 days, and there is heavy bleeding at the beginning.  She has no other evidence of blood loss from her GI tract or elsewhere.      The second thing that was noted on her labs was a glucose of 101.  She had an A1C on 10/03/2018 of 6%.  She is overweight.  She has been under a lot of stress in the last year and has had difficulty with both her eating and exercise habits.      OBJECTIVE:  Blood pressure 143/82.  Pulse is 66.  Weight is 245 pounds.  Her BMI is 40.  She was not otherwise examined today.      ASSESSMENT AND PLAN:   1.  Iron-deficiency anemia.  It seems this is very likely due to heavy menses.  I have prescribed ferrous gluconate 325 mg t.i.d.  She should have repeat labs in approximately 3 months.  I did recommend that she see Gynecology to address the heavy menses and consider birth control or other options that might reduce the blood loss.   2.  Prediabetes.  We reviewed the definition of prediabetes and the fact that weight loss would likely be very effective and important for her.  After considerable discussion, she elected a referral to nutrition services, which I placed.     Total time spent 25 minutes.  More than 50% of the time spent with Ms. Solomon on counseling / coordinating her care.

## 2018-11-08 NOTE — NURSING NOTE
Chief Complaint   Patient presents with     Results     pt is here to review lab results        Isabelle Oshea CMA at 6:12 PM on 11/7/2018

## 2019-02-01 ENCOUNTER — OFFICE VISIT (OUTPATIENT)
Dept: NUTRITION | Facility: CLINIC | Age: 44
End: 2019-02-01
Payer: COMMERCIAL

## 2019-02-01 ENCOUNTER — OFFICE VISIT (OUTPATIENT)
Dept: INTERNAL MEDICINE | Facility: CLINIC | Age: 44
End: 2019-02-01
Payer: COMMERCIAL

## 2019-02-01 VITALS — BODY MASS INDEX: 40.27 KG/M2 | HEIGHT: 67 IN | WEIGHT: 256.6 LBS

## 2019-02-01 VITALS
OXYGEN SATURATION: 99 % | DIASTOLIC BLOOD PRESSURE: 86 MMHG | SYSTOLIC BLOOD PRESSURE: 124 MMHG | BODY MASS INDEX: 41.3 KG/M2 | WEIGHT: 257 LBS | HEIGHT: 66 IN | HEART RATE: 74 BPM

## 2019-02-01 DIAGNOSIS — D50.0 IRON DEFICIENCY ANEMIA DUE TO CHRONIC BLOOD LOSS: ICD-10-CM

## 2019-02-01 DIAGNOSIS — E66.3 OVERWEIGHT: Primary | ICD-10-CM

## 2019-02-01 DIAGNOSIS — D50.9 IRON DEFICIENCY ANEMIA, UNSPECIFIED IRON DEFICIENCY ANEMIA TYPE: ICD-10-CM

## 2019-02-01 DIAGNOSIS — E66.09 OBESITY DUE TO EXCESS CALORIES WITH SERIOUS COMORBIDITY, UNSPECIFIED CLASSIFICATION: ICD-10-CM

## 2019-02-01 DIAGNOSIS — R73.03 PREDIABETES: Primary | ICD-10-CM

## 2019-02-01 DIAGNOSIS — Z71.84 TRAVEL ADVICE ENCOUNTER: ICD-10-CM

## 2019-02-01 DIAGNOSIS — M25.562 CHRONIC PAIN OF LEFT KNEE: ICD-10-CM

## 2019-02-01 DIAGNOSIS — M17.12 PRIMARY OSTEOARTHRITIS OF LEFT KNEE: ICD-10-CM

## 2019-02-01 DIAGNOSIS — R73.03 PREDIABETES: ICD-10-CM

## 2019-02-01 DIAGNOSIS — R73.03 PRE-DIABETES: ICD-10-CM

## 2019-02-01 DIAGNOSIS — Z23 NEED FOR TETANUS BOOSTER: ICD-10-CM

## 2019-02-01 DIAGNOSIS — G89.29 CHRONIC PAIN OF LEFT KNEE: ICD-10-CM

## 2019-02-01 LAB
ERYTHROCYTE [DISTWIDTH] IN BLOOD BY AUTOMATED COUNT: 19 % (ref 10–15)
FERRITIN SERPL-MCNC: 28 NG/ML (ref 12–150)
HBA1C MFR BLD: 5.8 % (ref 0–5.6)
HCT VFR BLD AUTO: 36.2 % (ref 35–47)
HGB BLD-MCNC: 10.6 G/DL (ref 11.7–15.7)
IRON SATN MFR SERPL: 13 % (ref 15–46)
IRON SERPL-MCNC: 40 UG/DL (ref 35–180)
MCH RBC QN AUTO: 21.9 PG (ref 26.5–33)
MCHC RBC AUTO-ENTMCNC: 29.3 G/DL (ref 31.5–36.5)
MCV RBC AUTO: 75 FL (ref 78–100)
PLATELET # BLD AUTO: 242 10E9/L (ref 150–450)
RBC # BLD AUTO: 4.83 10E12/L (ref 3.8–5.2)
TIBC SERPL-MCNC: 322 UG/DL (ref 240–430)
WBC # BLD AUTO: 7.2 10E9/L (ref 4–11)

## 2019-02-01 PROCEDURE — 97802 MEDICAL NUTRITION INDIV IN: CPT

## 2019-02-01 PROCEDURE — 99207 ZZC NO CHARGE LOS: CPT

## 2019-02-01 RX ORDER — FERROUS SULFATE 325(65) MG
325 TABLET ORAL
Qty: 100 TABLET | Refills: 1 | COMMUNITY
Start: 2019-02-01 | End: 2019-11-25

## 2019-02-01 ASSESSMENT — MIFFLIN-ST. JEOR
SCORE: 1838.49
SCORE: 1856.56

## 2019-02-01 ASSESSMENT — PAIN SCALES - GENERAL: PAINLEVEL: MODERATE PAIN (5)

## 2019-02-01 NOTE — NURSING NOTE
"Chief Complaint   Patient presents with     Refill Request     patient is following up on labs      Knee Pain     x4 months knee pain, worse on left, patient reports \"crackling\" noise with movement        Alycia Levine, EMT at 11:05 AM on 2/1/2019.   "

## 2019-02-01 NOTE — PROGRESS NOTES
"[facility]   Cookie Donnelly MD  02/01/2019      Chief Complaint:   Refill Request and Knee Pain       History of Present Illness:   Kim Solomon is a 43 year old female with a history of undifferentiated inflammatory arthritis, depression, and ADD who presents for evaluation of a follow up.    Pre-Diabetes  Kim was previously seen by Dr. Lepe who prescribed ferrous gluconate 325 mg t.i.d. Due to iron deficiency anemia. Patient was recommended to see gynecology to address heavy menses and consider birth control options to reduce blood loss. Patient was informed of the definition of prediabetes and action to prevent diabetes diagnosis. Today, Kim has an appointment with a nutritionist. She feels her diet can improve drastically. She would also like to check her iron levels again after beginning to take supplements. Kim has been seen by doctors before for the issue of iron deficiency. Her menstrual cycles lasts about 3 days and happens every 28 days. On the second 12 hours of day one she finds herself using a superabsorbant of tampons. No blood in the stools. No diarrhea or belly pain. Patient is not on hormones.    Mental Health  Kim's father past away in 2017 from complications of parkinsons. Her mother was also diagnosed with Endometrial Cancer last year. She had post-surgical complications and experienced heart issues. Due to this, Kim desires to be monitored for cancer and heart issues. She has had a rough past 18 months. She feels her mental health has been better. Kim goes to the gym twice a week and works with a . She also recently got a new puppy as well.     Left Knee Pain  Kim has noticed gradual pain in her left knee. She describes noises of \"crackling\" that her knee makes. She says the feeling is comparable to gears grinding. The pain doesn't restrict her physical activity. No falls or twists recently. The knee does click and intermittently catches. " When she is going up stairs, by the 2nd to 3rd flight she notices the pain setting in. She is interested in an injection but has concern with what she can have given her allergies.    Other concerns discussed:  -Kim will be travelling to [facility] for the summer. She is curious what actions are required for her prior to leaving.     Review of Systems:   Pertinent items are noted in HPI, remainder of complete ROS is negative.      Active Medications:      albuterol 90 MCG/ACT inhaler, Inhale 2 puffs into the lungs every 6 hours as needed., Disp: , Rfl:      beclomethasone (QVAR) 80 MCG/ACT Inhaler, Inhale 2 puffs into the lungs 2 times daily, Disp: 1 Inhaler, Rfl: 11     cetirizine (ZYRTEC) 10 MG tablet, Take 10 mg by mouth daily., Disp: , Rfl:      desonide (DESOWEN) 0.05 % cream, Apply topically 2 times daily For max 7-10 days or shortest time to improvement, Disp: 60 g, Rfl: 1     EPINEPHrine (EPIPEN JR) 0.15 MG/0.3ML injection, Inject 0.15 mg into the muscle as needed.  , Disp: , Rfl:      fluticasone (VERAMYST) 27.5 MCG/SPRAY spray, Spray 2 sprays into both nostrils daily, Disp: , Rfl:      lisdexamfetamine (VYVANSE) 20 MG capsule, Take 1 capsule by mouth daily., Disp: 30 capsule, Rfl: 0     montelukast (SINGULAIR) 10 MG tablet, Take 10 mg by mouth At Bedtime., Disp: , Rfl:      omeprazole (PRILOSEC) 40 MG capsule, Take 1 capsule (40 mg) by mouth daily, Disp: 90 capsule, Rfl: 3     sertraline (ZOLOFT) 100 MG tablet, Take 100 mg by mouth daily, Disp: , Rfl:       Allergies:   Diphtheria toxoid; Medrol [methylprednisolone sodium succinate]; Orange fruit; Peanuts [nuts]; Penicillins; and Prednisone      Past Medical History:  ADD  Asthma  Depression  GERD  Obesity   SAADIA  Allergic rhinitis  Iron deficiency anemia  Contact dermatitis and other eczema, due to unspecified cause  Undifferentiated inflammatory arthritis  Abdominal pain     Past Surgical History:  Antrostomy nasal  Endoscopic sinus surgery  Eye  "surgery  Laparoscopic cholecystectomy with cholangiograms    Family History:   Heart failure- Maternal Grandmother, Mother  Breast cancer- Maternal Grandmother  RA- Mother  Hypertension- Mother  CAD- Mother   Parkinsonism- Father      Social History:   The patient was alone.  Smoking Status: never   Smokeless Tobacco: never used   Alcohol Use: no   Marital Status:      Physical Exam:   /86 (BP Location: Right arm, Patient Position: Sitting)   Pulse 74   Ht 1.67 m (5' 5.75\")   Wt 116.6 kg (257 lb)   SpO2 99%   Breastfeeding? No   BMI 41.80 kg/m     Constitutional: Alert, oriented, pleasant, no acute distress  Head: Normocephalic, atraumatic  Eyes: Extra-ocular movements intact, no scleral icterus  Musculoskeletal: No edema, normal muscle tone, normal gait, bilateral knees with crepidis, left knee notable for no effusions or joint line tenderness, no laxity, no pain with varus or valgus stress, Jai's test: Negative  Neurologic: Alert and oriented, cranial nerves 2-12 intact.  Skin: No rashes/lesions  Psychiatric: normal mentation, affect and mood       Assessment and Plan:    Prediabetes  Encouraged lifestyle changes. Will repeat labs in 6 months.  - Hemoglobin A1c **(6 MO)    Iron deficiency anemia due to chronic blood loss  Presumed due to menstrual bleeding. Will recheck labs today. Consider GYN intervention or further evaluation if anemia becomes persistent. Discussed best strategies for iron absorption.  - Iron and iron binding capacity  - CBC with platelets  - Ferritin  - ferrous sulfate (FEROSUL) 325 (65 Fe) MG tablet  Dispense: 100 tablet; Refill: 1    Obesity due to excess calories with serious comorbidity, unspecified classification  Working out with a  twice a week. Has a nutrition referral today.  - Hemoglobin A1c **(6 MO)    Chronic pain of left knee and Primary osteoarthritis of left knee  Secondary to osteoarthritis. Discussed conservative management and offered knee " injection in the future.    Travel Advice Encounter ([facility] this summer)  Need for tetanus booster  She reports being up to date on Hep A/B.  - TD PRESERVATIVE FREE, AGE >=7 YR       #Routine Health Maintenence:  Immunizations (zoster, pneumovax, flu, Tdap, Hep A/B):  Most Recent Immunizations   Administered Date(s) Administered     Influenza (IIV3) PF 10/18/2016     TD (ADULT, 7+) 02/01/2019     Lipids:   Recent Labs   Lab Test 10/03/18  0741 01/23/17  0835   CHOL 176 203*   HDL 39* 44*   * 131*   TRIG 143 138     Colonoscopy (50-75 yrs): n/a  Dexa (>65W or 70M yrs): n/a  Mammogram (40-75 yrs): not done yet  Pap (21-65 yrs): 4/16, NIL per patient, does paps/breast exams at outside MD  Tob/EtOH: screen neg  Depression:   PHQ-2 Score:     PHQ-2 ( 1999 Pfizer) 11/1/2013   Q1: Little interest or pleasure in doing things 0   Q2: Feeling down, depressed or hopeless 0   PHQ-2 Score 0           Follow-up: Return in about 4 months (around 6/1/2019) for Routine Visit.         Scribe Disclosure:   I, Speedy Potts, am serving as a scribe to document services personally performed by Cookie Donnelly MD at this visit, based upon the provider's statements to me. All documentation has been reviewed by the aforementioned provider prior to being entered into the official medical record.     Portions of this medical record were completed by a scribe. UPON MY REVIEW AND AUTHENTICATION BY ELECTRONIC SIGNATURE, this confirms (a) I performed the applicable clinical services, and (b) the record is accurate.   Cookie Donnelly MD  Internal Medicine    40 min spent face to face, of which >50% time spent on counseling/coordinating care exclusive of any procedure time

## 2019-02-01 NOTE — PATIENT INSTRUCTIONS
[facility] Medication Refill Request Information:  * Please contact your pharmacy regarding ANY request for medication refills.  ** [facility] Prescription Fax = [phone]  * Please allow 3 business days for routine medication refills.  * Please allow 5 business days for controlled substance medication refills.     [facility] Test Result notification information:  *You will be notified with in 7-10 days of your appointment day regarding the results of your test.  If you are on MyChart you will be notified as soon as the provider has reviewed the results and signed off on them.    [facility]: [phone]
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
## 2019-02-01 NOTE — NURSING NOTE
Kim Solomon comes into clinic today at the request of Dr. Cookie Donnelly, Ordering Provider for an immunization/s.    I gave the TD immunization/s while the patient was in clinic. They received an informational sheet and I explained the common side effects of the injection. The patient tolerated the procedure well and had no complications while here in clinic.     This service provided today was under the supervising provider of the day Dr. Cookie Donnelly, who was available if needed.    Alycia Levine, EMT at 12:28 PM on 2/1/2019.

## 2019-02-01 NOTE — PATIENT INSTRUCTIONS
Goals:     1. Prep salty snacks on weekend so in smaller snack bags.    2. Prep some snacks on the weekend so ready to eat during the week (veggie tray).      3. Prep at least 1-2 meals to reheat during the week     4. Great job with going the gym!  Keep active- this will help with building endurance for your trip.    Other ideas:    - Make sure you have some carbohydrates at each meal.  If this is low, you may be craving carbohydrate-rich foods after dinner.    FOLLOW UP: Wednesday, March 6th at 3:30pm at [facility]    Sara Barraza RD, MICHELLE, CDE   [phone]

## 2019-03-06 ENCOUNTER — OFFICE VISIT (OUTPATIENT)
Dept: NUTRITION | Facility: CLINIC | Age: 44
End: 2019-03-06
Attending: INTERNAL MEDICINE
Payer: COMMERCIAL

## 2019-03-06 VITALS — BODY MASS INDEX: 39.75 KG/M2 | WEIGHT: 253.8 LBS

## 2019-03-06 DIAGNOSIS — E66.3 OVERWEIGHT: Primary | ICD-10-CM

## 2019-03-06 DIAGNOSIS — R73.03 PREDIABETES: ICD-10-CM

## 2019-03-06 PROCEDURE — 97803 MED NUTRITION INDIV SUBSEQ: CPT

## 2019-03-06 NOTE — PATIENT INSTRUCTIONS
1. Continue to focus on portion size and how often you are snacking.    2. Continue to keep hands busy at night.    3. Continue with the gym 2 days a week.     4. Use the [employer] arin and be full in with it the next 2 weeks.     FOLLOW UP: Wednesday, April 24th at 3:30pm at [facility]    Sara Barraza RD, MICHELLE, CDE   [phone]

## 2019-04-24 ENCOUNTER — ALLIED HEALTH/NURSE VISIT (OUTPATIENT)
Dept: NUTRITION | Facility: CLINIC | Age: 44
End: 2019-04-24
Payer: COMMERCIAL

## 2019-04-24 VITALS — WEIGHT: 243.2 LBS | BODY MASS INDEX: 38.09 KG/M2

## 2019-04-24 DIAGNOSIS — R73.03 PRE-DIABETES: ICD-10-CM

## 2019-04-24 DIAGNOSIS — E66.3 OVERWEIGHT: Primary | ICD-10-CM

## 2019-04-24 PROCEDURE — 97803 MED NUTRITION INDIV SUBSEQ: CPT

## 2019-04-24 NOTE — PATIENT INSTRUCTIONS
1. Come up with a themed meal for the weekday- meatless Monday, Taco Tuesday, Breakfast Wednesday, Stir-christensen Thursday, Pizza Friday (quick and easy meal ideas/recipes)    2. Try to get 3 servings of whole grains a day (average)    3. Keep going to gym 2 days a week     FOLLOW UP: Wednesday, June 19th at 11am at [facility]    Sara Barraza RD, LD, CDE   [phone]

## 2019-04-24 NOTE — PROGRESS NOTES
Medical Nutrition Therapy  Visit Type:Reassessment and intervention    Kim Solomon presents today for MNT and education related to prediabetes and weight management.   She is accompanied by self.     ASSESSMENT:   Patient comments/concerns relating to nutrition: Says feeling better ad weight is down more at home.  Says was really sick over the weekend and was puking for 12 hours and had severe diarrhea.  Feeling better since Monday- lasted about 24 hours.     Says trip to [place] went well and they ate really healthy- lots of fruits and vegetables and packed snacks and lunches.  Also had more activity.  Went to [place] for [place] weekend and brought bag apples, cheese sticks, almonds, turkey pepperoni, carrots and would bring them with her to the [facility].  Had some chocolate covered almonds and kera-almonds for a treat- still working well for her.     Plans for this weekend, energy level to prepare snacks is low.  Was going to cut up veggies for the grab and go.  Until was sick last week, was doing to do this for this week.      Continues to use [employer] arin and working with the  on that program. Finds it helpful since will get quick feed back.  Has been helping with positive energy and thoughts.     Biggest struggle is still dinners.  has chronic pain so not have energy at time of dinner.  Tends to grab quick meal ideas such as fast food or protein bar.     NUTRITION HISTORY:  Still recovering from being sick this weekend.   Breakfast: mini scone and tall iced coffee with SF vanilla syrup OR [place] and granola with almond milk   Lunch: 10:3am: carrots, cucumber, bell pepper, Splended spoon carrot, arelis, roxana seed smoothie OR chicken cary, fresh veggies, skim milk   PM: Triple Zero Greek yogurt OR gold fish crackers  Dinner: protein bar and Ritz crackers OR cauliflower pizza   Snacks: none- has been trying to cut back   Beverages: Sports Drink (Gatorade, Propel, etc.)  1-2x/day and  Water all day    Misses meals? none  Eats out:  2-3 meals/per week     Previous diet education:  Yes     Food allergies/intolerances: legumes and peanuts    Diet is high in: carbs at some sittings with not feeling well  Diet is low in: calcium and whole grains    EXERCISE: Most weeks still goes to the gym 2 days a week.    SOCIO/ECONOMIC:   Lives with: spouse    MEDICATIONS:  Current Outpatient Medications   Medication     albuterol 90 MCG/ACT inhaler     beclomethasone (QVAR) 80 MCG/ACT Inhaler     cetirizine (ZYRTEC) 10 MG tablet     EPINEPHrine (EPIPEN JR) 0.15 MG/0.3ML injection     ferrous sulfate (FEROSUL) 325 (65 Fe) MG tablet     fluticasone (VERAMYST) 27.5 MCG/SPRAY spray     lisdexamfetamine (VYVANSE) 20 MG capsule     omeprazole (PRILOSEC) 40 MG capsule     sertraline (ZOLOFT) 100 MG tablet     No current facility-administered medications for this visit.        LABS:  Last Basic Metabolic Panel:  Lab Results   Component Value Date     10/03/2018      Lab Results   Component Value Date    POTASSIUM 4.1 10/03/2018     Lab Results   Component Value Date    CHLORIDE 105 10/03/2018     Lab Results   Component Value Date    VIGNESH 8.4 10/03/2018     Lab Results   Component Value Date    CO2 28 10/03/2018     Lab Results   Component Value Date    BUN 15 10/03/2018     Lab Results   Component Value Date    CR 0.79 10/03/2018     Lab Results   Component Value Date     10/03/2018       ANTHROPOMETRICS:  Vitals: Wt 110.3 kg (243 lb 3.2 oz)   BMI 38.09 kg/m    Body mass index is 38.09 kg/m .      Wt Readings from Last 5 Encounters:   03/06/19 115.1 kg (253 lb 12.8 oz)   02/01/19 116.4 kg (256 lb 9.6 oz)   02/01/19 116.6 kg (257 lb)   11/07/18 111.4 kg (245 lb 11.2 oz)   09/19/18 110.7 kg (244 lb 1.6 oz)       Weight Change: Lost 10.6 lbs in the past 6 weeks.    ESTIMATED KCAL REQUIREMENTS:  2160 kcal per day    NUTRITION DIAGNOSIS: Overweight/Obesity related to prior intake of higher calorie foods and  larger portions as evidenced by >30    NUTRITION INTERVENTION:  Nutrition Prescription: Energy Intake: 7638-0781 kcal/day for weight loss   Education given to support: general nutrition guidelines, weight reduction, consistent meals, exercise, dining out/special occasions, fiber, behavior modification, portion control and heart healthy diet  Motivational Interviewing    Discussion: Commended Kim on her 10 lbs weight loss in the past 6 weeks. She was sick over the weekend so got off track with meals and activity but continues to work on portion control and pre-measure/pre-portions snacks and use smaller bowls and continues to stay active.  She is finding the [employer] arin very helpful since she works with a health  and gets feedback immediately if she is struggling.  Says she had some snack ideas for her conference from [employer]; thought conference and vacation went really well with packing healthy snacks to eat during the day.      Kim says the only thing she continues to struggle with is dinner.  Suggested trying to make a theme dinner of the day such as Meatless Monday or Taco Tuesday to take some of the thought/pressure out of the meal planning while allowing for variety.  She is open to this idea.    Kim appears to be getting a wide variety of foods but appears to be a little low in grains with only 1-2 servings a day.  Encouraged her to aim for 3 servings whole grains a day on average since source of B-Vitamins and not want to be low in these nutrients.  She is agreeable to this suggestion.  Appears to be adequate in fruits and vegetables, dairy (3 servings/day from milk, yogurt and piece cheese) and tries to include protein with meals.  Pt verbalized understanding of concepts discussed and recommendations provided.       PATIENT'S BEHAVIOR CHANGE GOALS:   See Patient Instructions for patient stated behavior change goals. AVS was requested to be sent via [employer] at today's appointment, so this was  done.    MONITOR / EVALUATE:  RD will monitor/evaluate:  Progress toward meeting stated nutrition-related goals  Weight change    FOLLOW-UP:  Call RD with questions/concerns.   Follow-up appointment scheduled on 6/19/19.     Sara Barraza RD, LD, CDE   Time spent in minutes: 45 minutes  Encounter: Individual

## 2019-05-20 ENCOUNTER — OFFICE VISIT (OUTPATIENT)
Dept: LAB | Facility: SCHOOL | Age: 44
End: 2019-05-20
Payer: COMMERCIAL

## 2019-05-20 VITALS
TEMPERATURE: 98.3 F | RESPIRATION RATE: 16 BRPM | HEIGHT: 66 IN | HEART RATE: 80 BPM | BODY MASS INDEX: 38.89 KG/M2 | DIASTOLIC BLOOD PRESSURE: 86 MMHG | WEIGHT: 242 LBS | OXYGEN SATURATION: 95 % | SYSTOLIC BLOOD PRESSURE: 124 MMHG

## 2019-05-20 DIAGNOSIS — J06.9 VIRAL UPPER RESPIRATORY TRACT INFECTION: Primary | ICD-10-CM

## 2019-05-20 DIAGNOSIS — R07.0 THROAT PAIN: ICD-10-CM

## 2019-05-20 LAB — S PYO AG THROAT QL IA.RAPID: NORMAL

## 2019-05-20 PROCEDURE — 87880 STREP A ASSAY W/OPTIC: CPT

## 2019-05-20 PROCEDURE — 99213 OFFICE O/P EST LOW 20 MIN: CPT

## 2019-05-20 PROCEDURE — 87081 CULTURE SCREEN ONLY: CPT | Performed by: NURSE PRACTITIONER

## 2019-05-20 ASSESSMENT — MIFFLIN-ST. JEOR: SCORE: 1774.45

## 2019-05-20 NOTE — PATIENT INSTRUCTIONS
1.  Information provided on symptom management.  2.  You will be notified if culture is positive for treatment tomorrow.  3.  Follow-up in clinic if any persistent symptoms more than 10 days total or worsening symptoms.    Patient Education     Viral Upper Respiratory Illness (Adult)    You have a viral upper respiratory illness (URI), which is another term for the common cold. This illness is contagious during the first few days. It is spread through the air by coughing and sneezing. It may also be spread by direct contact (touching the sick person and then touching your own eyes, nose, or mouth). Frequent handwashing will decrease risk of spread. Most viral illnesses go away within 7 to 10 days with rest and simple home remedies. Sometimes the illness may last for several weeks. Antibiotics will not kill a virus, and they are generally not prescribed for this condition.  Home care    If symptoms are severe, rest at home for the first 2 to 3 days. When you resume activity, don't let yourself get too tired.    Don't smoke. If you need help stopping, talk with your healthcare provider.    Avoid being exposed to cigarette smoke (yours or others ).    You may use acetaminophen or ibuprofen to control pain and fever, unless another medicine was prescribed. If you have chronic liver or kidney disease, have ever had a stomach ulcer or gastrointestinal bleeding, or are taking blood-thinning medicines, talk with your healthcare provider before using these medicines. Aspirin should never be given to anyone under 18 years of age who is ill with a viral infection or fever. It may cause severe liver or brain damage.    Your appetite may be poor, so a light diet is fine. Stay well hydrated by drinking 6 to 8 glasses of fluids per day (water, soft drinks, juices, tea, or soup). Extra fluids will help loosen secretions in the nose and lungs.    Over-the-counter cold medicines will not shorten the length of time you re sick, but  they may be helpful for the following symptoms: cough, sore throat, and nasal and sinus congestion. If you take prescription medicines, ask your healthcare provider or pharmacist which over-the-counter medicines are safe to use. (Note: Don't use decongestants if you have high blood pressure.)  Follow-up care  Follow up with your healthcare provider, or as advised.  When to seek medical advice  Call your healthcare provider right away if any of these occur:    Cough with lots of colored sputum (mucus)    Severe headache; face, neck, or ear pain    Difficulty swallowing due to throat pain    Fever of 100.4 F (38 C) or higher, or as directed by your healthcare provider  Call 911  Call 911 if any of these occur:    Chest pain, shortness of breath, wheezing, or difficulty breathing    Coughing up blood    Very severe pain with swallowing, especially if it goes along with a muffled voice   Date Last Reviewed: 6/1/2018 2000-2018 [employer]. [address] [place] [address]. All rights reserved. This information is not intended as a substitute for professional medical care. Always follow your healthcare professional's instructions.           Patient Education     Self-Care for Sore Throats    Sore throats happen for many reasons, such as colds, allergies, and infections caused by viruses or bacteria. In any case, your throat becomes red and sore. Your goal for self-care is to reduce your discomfort while giving your throat a chance to heal.  Moisten and soothe your throat  Tips include the following:    Try a sip of water first thing after waking up.    Keep your throat moist by drinking 6 or more glasses of clear liquids every day.    Run a cool-air humidifier in your room overnight.    Avoid cigarette smoke.     Suck on throat lozenges, cough drops, hard candy, ice chips, or frozen fruit-juice bars. Use the sugar-free versions if your diet or medical condition requires them.  Gargle to ease  irritation  Gargling every hour or 2 can ease irritation. Try gargling with 1 of these solutions:    1/4 teaspoon of salt in 1/2 cup of warm water    An over-the-counter anesthetic gargle  Use medicine for more relief  Over-the-counter medicine can reduce sore throat symptoms. Ask your pharmacist if you have questions about which medicine to use:    Ease pain with anesthetic sprays. Aspirin or an aspirin substitute also helps. Remember, never give aspirin to anyone 18 or younger, or if you are already taking blood thinners.     For sore throats caused by allergies, try antihistamines to block the allergic reaction.    Remember: unless a sore throat is caused by a bacterial infection, antibiotics won t help you.  Prevent future sore throats  Prevention tips include the following:    Stop smoking or reduce contact with secondhand smoke. Smoke irritates the tender throat lining.    Limit contact with pets and with allergy-causing substances, such as pollen and mold.    When you re around someone with a sore throat or cold, wash your hands often to keep viruses or bacteria from spreading.    Don t strain your vocal cords.  Call your healthcare provider  Contact your healthcare provider if you have:    A temperature over 101 F (38.3 C)    White spots on the throat    Great difficulty swallowing    Trouble breathing    A skin rash    Recent exposure to someone else with strep bacteria    Severe hoarseness and swollen glands in the neck or jaw   Date Last Reviewed: 8/1/2016 2000-2018 [employer]. [address] [place] [address], [place] [address]. All rights reserved. This information is not intended as a substitute for professional medical care. Always follow your healthcare professional's instructions.

## 2019-05-20 NOTE — PROGRESS NOTES
Kim Solomon is a 43 year old female who presents to clinic today for the following health issues:    HPI   ENT Symptoms             Symptoms: cc Present Absent Comment   Fever/Chills   x    Fatigue  x     Muscle Aches  x     Eye Irritation   x    Sneezing  x     Nasal Sam/Drg  x     Sinus Pressure/Pain  x     Loss of smell   x    Dental pain   x    Sore Throat x x     Swollen Glands  x     Ear Pain/Fullness  x  Both ears fell full   Cough  x  Throat clearing phlegm like cough but not productive   Wheeze   x    Chest Pain   x    Shortness of breath   x    Rash   x    Other   x      Symptom duration:  3 days    Symptom severity:  moderate    Treatments tried:  Ibuprofen    Contacts:  School     Has not had to use albuterol for rescue in some time.  Has flonase at home but not using this.  Eating softer foods and drinking fine.    {  Patient Active Problem List   Diagnosis     Allergic rhinitis     Iron deficiency anemia     Contact dermatitis and other eczema, due to unspecified cause     Undifferentiated inflammatory arthritis (H)     S/P laparoscopic cholecystectomy     Abdominal pain, unspecified abdominal location     SAADIA (obstructive sleep apnea)     Past Surgical History:   Procedure Laterality Date     ANTROSTOMY NASAL  6/21/2011    Procedure:ANTROSTOMY NASAL; Bilateral Maxillary  Antrostomy; Surgeon:JOSELUIS MISHRA; Location: OR     ENDOSCOPIC SINUS SURGERY  6/21/2011    Procedure:ENDOSCOPIC SINUS SURGERY; Bilateral Endoscopic Sinus Surgery Anterior Ethmoidectomy; Surgeon:JOSELUIS MISHRA; Location: OR     EYE SURGERY       LAPAROSCOPIC CHOLECYSTECTOMY WITH CHOLANGIOGRAMS  9/10/2012    Procedure: LAPAROSCOPIC CHOLECYSTECTOMY WITH CHOLANGIOGRAMS;  Laparoscopic Cholecystectomy,  ;  Surgeon: Juancho Jim MD;  Location:  OR       Social History     Tobacco Use     Smoking status: Never Smoker     Smokeless tobacco: Never Used   Substance Use Topics     Alcohol use: No     Comment: occasionally     Family  History   Problem Relation Age of Onset     Heart Failure Maternal Grandmother      Breast Cancer Maternal Grandmother      Rheumatoid Arthritis Mother      Hypertension Mother      C.A.D. Mother 69     Heart Failure Mother         on O2 in Assisted Living     Endometrial Cancer Mother         dx 3/18     Parkinsonism Father                   Current Outpatient Medications   Medication Sig Dispense Refill     albuterol 90 MCG/ACT inhaler Inhale 2 puffs into the lungs every 6 hours as needed.       beclomethasone (QVAR) 80 MCG/ACT Inhaler Inhale 2 puffs into the lungs 2 times daily 1 Inhaler 11     cetirizine (ZYRTEC) 10 MG tablet Take 10 mg by mouth daily.       lisdexamfetamine (VYVANSE) 20 MG capsule Take 1 capsule by mouth daily. 30 capsule 0     omeprazole (PRILOSEC) 40 MG capsule Take 1 capsule (40 mg) by mouth daily 90 capsule 3     sertraline (ZOLOFT) 100 MG tablet Take 100 mg by mouth daily       EPINEPHrine (EPIPEN JR) 0.15 MG/0.3ML injection Inject 0.15 mg into the muscle as needed.         ferrous sulfate (FEROSUL) 325 (65 Fe) MG tablet Take 1 tablet (325 mg) by mouth daily (with breakfast) (Patient not taking: Reported on 2019) 100 tablet 1     fluticasone (VERAMYST) 27.5 MCG/SPRAY spray Spray 2 sprays into both nostrils daily       Allergies   Allergen Reactions     Diphtheria Toxoid Hives     Medrol [Methylprednisolone Sodium Succinate] Hives     Per allergy testing. Allergic to all oral steroids. OK with topical and inhaled.     Orange Fruit      Peanuts [Nuts]      All legumes and related oils     Penicillins Hives     Prednisone Hives       Reviewed and updated as needed this visit by Provider  Tobacco  Allergies  Meds  Problems  Med Hx  Surg Hx  Fam Hx         Review of Systems   ROS COMP: CONSTITUTIONAL: NEGATIVE for fever, chills, change in weight  INTEGUMENTARY/SKIN: NEGATIVE for worrisome rashes, moles or lesions  ENT/MOUTH: POSITIVE for ear pain/pressure bilateral, nasal  "congestion, postnasal drainage, sinus pressure and sore throat  RESP:POSITIVE for cough-non productive  CV: NEGATIVE for chest pain, palpitations or peripheral edema  PSYCHIATRIC: NEGATIVE for changes in mood or affect  ROS otherwise negative      Objective    /86   Pulse 80   Temp 98.3  F (36.8  C) (Tympanic)   Resp 16   Ht 1.676 m (5' 6\")   Wt 109.8 kg (242 lb)   SpO2 95%   BMI 39.06 kg/m    Body mass index is 39.06 kg/m .  Physical Exam   GENERAL: healthy, alert and no distress  EYES: Eyes grossly normal to inspection, PERRL and conjunctivae and sclerae normal  HENT: normal cephalic/atraumatic, both ears: clear effusion, nasal mucosa edematous , oropharynx clear, oral mucous membranes moist and redness in peripheral oropharyngeal with no tonsillar hypertrophy or exudate  NECK: no adenopathy and no asymmetry, masses, or scars  RESP: lungs clear to auscultation - no rales, rhonchi or wheezes  CV: regular rate and rhythm, normal S1 S2, no S3 or S4, no murmur, click or rub, no peripheral edema and peripheral pulses strong  PSYCH: mentation appears normal, affect normal/bright    Diagnostic Test Results:  Strep screen - Negative        Assessment & Plan     1. Viral upper respiratory tract infection  Exam is negative for concerns.  Rapid strep is negative, culture sent.  Patient will be notified tomorrow if positive for treatment.  Information provided on viral URI and sore throat management.  Recommend follow-up in clinic if any persistent or worsening symptoms.    2. Throat pain  - Rapid strep screen    See Patient Instructions    Return in about 1 week (around 5/27/2019), or if symptoms worsen or fail to improve, for with primary care provider.    Izabel Rosado NP on 5/20/2019 at 10:24 AM  [facility]     "

## 2019-05-22 LAB
BACTERIA SPEC CULT: NORMAL
SPECIMEN SOURCE: NORMAL

## 2019-05-22 NOTE — RESULT ENCOUNTER NOTE
Final Beta strep group A r/o culture is NEGATIVE for Group A streptococcus.    No treatment or change in treatment per [facility] Strep protocol.

## 2019-06-12 DIAGNOSIS — R73.03 PREDIABETES: ICD-10-CM

## 2019-06-12 DIAGNOSIS — E66.09 OBESITY DUE TO EXCESS CALORIES WITH SERIOUS COMORBIDITY, UNSPECIFIED CLASSIFICATION: ICD-10-CM

## 2019-06-12 LAB — HBA1C MFR BLD: 5.7 % (ref 0–5.6)

## 2019-06-13 ENCOUNTER — OFFICE VISIT (OUTPATIENT)
Dept: INTERNAL MEDICINE | Facility: CLINIC | Age: 44
End: 2019-06-13
Payer: COMMERCIAL

## 2019-06-13 VITALS
RESPIRATION RATE: 16 BRPM | BODY MASS INDEX: 38.86 KG/M2 | HEART RATE: 69 BPM | SYSTOLIC BLOOD PRESSURE: 105 MMHG | HEIGHT: 66 IN | WEIGHT: 241.8 LBS | DIASTOLIC BLOOD PRESSURE: 69 MMHG | OXYGEN SATURATION: 98 % | TEMPERATURE: 98.9 F

## 2019-06-13 DIAGNOSIS — D50.9 IRON DEFICIENCY ANEMIA, UNSPECIFIED IRON DEFICIENCY ANEMIA TYPE: ICD-10-CM

## 2019-06-13 DIAGNOSIS — R73.03 PREDIABETES: ICD-10-CM

## 2019-06-13 DIAGNOSIS — J01.01 ACUTE RECURRENT MAXILLARY SINUSITIS: Primary | ICD-10-CM

## 2019-06-13 DIAGNOSIS — N92.0 MENORRHAGIA WITH REGULAR CYCLE: ICD-10-CM

## 2019-06-13 RX ORDER — BISACODYL 5 MG/1
5 TABLET, DELAYED RELEASE ORAL DAILY PRN
Qty: 90 TABLET | Refills: 1 | Status: SHIPPED | OUTPATIENT
Start: 2019-06-13 | End: 2019-11-25

## 2019-06-13 ASSESSMENT — ASTHMA QUESTIONNAIRES
ACUTE_EXACERBATION_TODAY: NO
QUESTION_4 LAST FOUR WEEKS HOW OFTEN HAVE YOU USED YOUR RESCUE INHALER OR NEBULIZER MEDICATION (SUCH AS ALBUTEROL): TWO OR THREE TIMES PER WEEK
QUESTION_5 LAST FOUR WEEKS HOW WOULD YOU RATE YOUR ASTHMA CONTROL: WELL CONTROLLED
QUESTION_2 LAST FOUR WEEKS HOW OFTEN HAVE YOU HAD SHORTNESS OF BREATH: ONCE OR TWICE A WEEK
ACT_TOTALSCORE: 19
QUESTION_3 LAST FOUR WEEKS HOW OFTEN DID YOUR ASTHMA SYMPTOMS (WHEEZING, COUGHING, SHORTNESS OF BREATH, CHEST TIGHTNESS OR PAIN) WAKE YOU UP AT NIGHT OR EARLIER THAN USUAL IN THE MORNING: ONCE OR TWICE
QUESTION_1 LAST FOUR WEEKS HOW MUCH OF THE TIME DID YOUR ASTHMA KEEP YOU FROM GETTING AS MUCH DONE AT WORK, SCHOOL OR AT HOME: A LITTLE OF THE TIME

## 2019-06-13 ASSESSMENT — PAIN SCALES - GENERAL: PAINLEVEL: NO PAIN (0)

## 2019-06-13 ASSESSMENT — MIFFLIN-ST. JEOR: SCORE: 1773.55

## 2019-06-13 NOTE — PATIENT INSTRUCTIONS
[facility] Medication Refill Request Information:  * Please contact your pharmacy regarding ANY request for medication refills.  ** Psychiatric Prescription Fax = [phone]  * Please allow 3 business days for routine medication refills.  * Please allow 5 business days for controlled substance medication refills.     [facility] Test Result notification information:  *You will be notified with in 7-10 days of your appointment day regarding the results of your test.  If you are on MyChart you will be notified as soon as the provider has reviewed the results and signed off on them.    [facility]: [phone]     [facility] ([facility]) [phone] ([address], [address])

## 2019-06-13 NOTE — NURSING NOTE
Chief Complaint   Patient presents with     Recheck Medication     Pt is here for a four month follow up for prediabetes.         Pilo Maldonado, EMT on 6/13/2019 at 9:33 AM

## 2019-06-13 NOTE — PROGRESS NOTES
[facility]  Primary Care Center   Cookie Donnelly MD  06/13/2019      Chief Complaint:   Menorrhagia and Asthma Excerbation     History of Present Illness:   Kim Solomon is a 43 year old female with a history of iron deficiency anemia, impaired fasting glucose, obesity, and asthma who presents for follow up from her 2/1/1 appointment.     Asthma Exacerbation    Patient had a cold which led to a sinus infection and conjunctivitis in both eyes. She was given azithromycin to treat her sinus infection. 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 Her asthma symptoms decrease when she remembers to take her Qvar twice a day, and she uses her rescue inhaler preventatively before exertion. Additionally, she uses Flonase to relieve nasal congestion. She is concerned that she is still congested and is flying to [facility] for 2 weeks on 7/5/19. Denies any fevers.    Menorrhagia   Patient has been experiencing increased menstrual bleeding in the past year. She started her menstrual cycle on 6/11 and used 8-10 super sized tampons in 48 hours. Additionally, she wears Thinx underwear for periods, as she often leaks through super plus tampons. She has painful cramps that occur with the heavy bleeding. However, the bleeding and cramps resolve by the third day of her cycle. In the past, she would have a 5 day cycle of even flow. Of note, she is anemic, but has been taking iron supplements. She notes not feeling as tired as she once was. Additionally, she was on hormonal birth control pills 4 years ago, but ceased use, due to unpleasant side effects. She did not consider an IUD in the past, as she was undecided on having children at the time it was discussed with her. She has been considering a hysterectomy for permanent treatment, as she and her  are now certain they don't want to have children.    Other concerns  discussed:  1. Pre-Diabetic - Her A1c was slightly elevated on 6/12 at 5.7.      Review of Systems:   Pertinent items are noted in HPI or as in patient entered ROS below, remainder of complete ROS is negative.     Active Medications:       albuterol 90 MCG/ACT inhaler, Inhale 2 puffs into the lungs every 6 hours as needed., Disp: , Rfl:      beclomethasone (QVAR) 80 MCG/ACT Inhaler, Inhale 2 puffs into the lungs 2 times daily, Disp: 1 Inhaler, Rfl: 11     bisacodyl (DULCOLAX) 5 MG EC tablet, Take 1 tablet (5 mg) by mouth daily as needed for constipation, Disp: 90 tablet, Rfl: 1     cetirizine (ZYRTEC) 10 MG tablet, Take 10 mg by mouth daily., Disp: , Rfl:      EPINEPHrine (EPIPEN JR) 0.15 MG/0.3ML injection, Inject 0.15 mg into the muscle as needed.  , Disp: , Rfl:      ferrous sulfate (FEROSUL) 325 (65 Fe) MG tablet, Take 1 tablet (325 mg) by mouth daily (with breakfast), Disp: 100 tablet, Rfl: 1     fluticasone (VERAMYST) 27.5 MCG/SPRAY spray, Spray 2 sprays into both nostrils daily, Disp: , Rfl:      lisdexamfetamine (VYVANSE) 20 MG capsule, Take 1 capsule by mouth daily., Disp: 30 capsule, Rfl: 0     omeprazole (PRILOSEC) 40 MG capsule, Take 1 capsule (40 mg) by mouth daily, Disp: 90 capsule, Rfl: 3     sertraline (ZOLOFT) 100 MG tablet, Take 100 mg by mouth daily, Disp: , Rfl:       Allergies:   Diphtheria toxoid; Medrol [methylprednisolone sodium succinate]; Orange fruit; Peanuts [nuts]; Penicillins; and Prednisone      Past Medical History:  ADD   Asthma  Depression   GERD  Obesity    SAADIA   Allergic rhinitis   Iron deficiency anemia   Contact dermatis and other eczema, due to  unspecified cause   Inflammatory arthritis      Past Surgical History:  Antrostomy nasal   Endoscopic sinus surgery   Eye surgery   Laparoscopic cholecystectomy with cholangiograms     Family History:   Maternal grandmother - heart failure, breast cancer   Mother - rheumatoid arthritis, hypertension, coronary artery disease, heat  "failure, endometrial cancer  Father - parkinsonism       Social History:   The patient was alone.  Smoking Status: Never smoker    Smokeless Tobacco: Never used   Marital Status: .  Employment status: teacher     Physical Exam:   /69 (BP Location: Right arm, Patient Position: Sitting, Cuff Size: Adult Large)   Pulse 69   Temp 98.9  F (37.2  C) (Oral)   Resp 16   Ht 1.676 m (5' 6\")   Wt 109.7 kg (241 lb 12.8 oz)   SpO2 98%   BMI 39.03 kg/m       Constitutional: Alert, oriented, pleasant, no acute distress  Head: Normocephalic, atraumatic  Eyes: Extra-ocular movements intact, no scleral icterus  ENT: Oropharynx clear, moist mucus membranes, good dentition, mild nasal congestion   Neck: Supple, no lymphadenopathy, no thyromegaly   Cardiovascular: Regular rate and rhythm, no murmurs, rubs or gallops, peripheral pulses full/symmetric  Respiratory: Good air movement bilaterally, lungs clear, no wheezes/rales/rhonchi  Musculoskeletal: No edema, normal muscle tone, normal gait  Neurologic: Alert and oriented, cranial nerves 2-12 intact.  Psychiatric: normal mentation, affect and mood     Assessment and Plan:  Acute recurrent maxillary sinusitis  Recently completed azithromycin course. Symptoms improving, though she still reports chest congestion. She is taking her asthma and allergy medications as well as Flonase. I told her to contact me if symptoms do not improve by the time she flies to [facility], and we could consider additional treatment.     Prediabetes  A1c is stable, continue to recommend lifestyle changes.     Iron deficiency anemia, unspecified iron deficiency anemia type  - CBC with platelets  - Ferritin  - bisacodyl (DULCOLAX) 5 MG EC tablet  Dispense: 90 tablet; Refill: 1    Menorrhagia with regular cycle  She desires more defintiive management of menorrhagia. We briefly discussed contraceptive and surgical options. I will refer her to gynecology to discuss further.   - OB/GYN " REFERRAL      #Routine Health Maintenence:  Immunizations (zoster, pneumovax, flu, Tdap, Hep A/B):       Most Recent Immunizations   Administered Date(s) Administered     Influenza (IIV3) PF 10/18/2016     TD (ADULT, 7+) 02/01/2019      Lipids:        Recent Labs   Lab Test 10/03/18  0741 01/23/17  0835   CHOL 176 203*   HDL 39* 44*   * 131*   TRIG 143 138      Colonoscopy (50-75 yrs): n/a  Dexa (>65W or 70M yrs): n/a  Mammogram (40-75 yrs): not done yet  Pap (21-65 yrs): 4/16, NIL per patient, does paps/breast exams at outside MD  Tob/EtOH: screen neg  Depression:   PHQ-2 Score:     PHQ-2 ( 1999 [employer]) 6/13/2019 11/1/2013   Q1: Little interest or pleasure in doing things 0 0   Q2: Feeling down, depressed or hopeless 0 0   PHQ-2 Score 0 0       Follow-up: Return in about 6 months (around 12/13/2019) for Routine Visit with A1c prior.      Scribe Disclosure:  I, Micheline Ventura, am serving as a scribe to document services personally performed by Cookie Donnelly MD at this visit, based upon the provider's statements to me. All documentation has been reviewed by the aforementioned provider prior to being entered into the official medical record.     Portions of this medical record were completed by a scribe. UPON MY REVIEW AND AUTHENTICATION BY ELECTRONIC SIGNATURE, this confirms (a) I performed the applicable clinical services, and (b) the record is accurate.   Cookie Donnelly MD  Internal Medicine    25 min spent face to face, of which >50% time spent on counseling/coordinating care exclusive of any procedure time

## 2019-06-14 ASSESSMENT — ASTHMA QUESTIONNAIRES: ACT_TOTALSCORE: 19

## 2019-06-17 DIAGNOSIS — D50.9 IRON DEFICIENCY ANEMIA, UNSPECIFIED IRON DEFICIENCY ANEMIA TYPE: ICD-10-CM

## 2019-06-17 LAB
ERYTHROCYTE [DISTWIDTH] IN BLOOD BY AUTOMATED COUNT: 17.9 % (ref 10–15)
FERRITIN SERPL-MCNC: 26 NG/ML (ref 12–150)
HCT VFR BLD AUTO: 34.6 % (ref 35–47)
HGB BLD-MCNC: 10.1 G/DL (ref 11.7–15.7)
MCH RBC QN AUTO: 22.6 PG (ref 26.5–33)
MCHC RBC AUTO-ENTMCNC: 29.2 G/DL (ref 31.5–36.5)
MCV RBC AUTO: 77 FL (ref 78–100)
PLATELET # BLD AUTO: 259 10E9/L (ref 150–450)
RBC # BLD AUTO: 4.47 10E12/L (ref 3.8–5.2)
WBC # BLD AUTO: 7.9 10E9/L (ref 4–11)

## 2019-06-19 ENCOUNTER — ALLIED HEALTH/NURSE VISIT (OUTPATIENT)
Dept: NUTRITION | Facility: CLINIC | Age: 44
End: 2019-06-19
Payer: COMMERCIAL

## 2019-06-19 VITALS — WEIGHT: 242.2 LBS | BODY MASS INDEX: 39.09 KG/M2

## 2019-06-19 DIAGNOSIS — R73.03 PRE-DIABETES: ICD-10-CM

## 2019-06-19 DIAGNOSIS — E66.3 OVERWEIGHT: Primary | ICD-10-CM

## 2019-06-19 PROCEDURE — 97803 MED NUTRITION INDIV SUBSEQ: CPT

## 2019-06-19 NOTE — PROGRESS NOTES
Medical Nutrition Therapy  Visit Type:Reassessment and intervention    Kim Solomon presents today for MNT and education related to prediabetes and weight management.   She is accompanied by self.     ASSESSMENT:   Patient comments/concerns relating to nutrition: Says on summer break and plans to do a training next week. Says no schedule with summer break is challenging.  Says helps when making personal training appointment at gym- gets her out of the house.    Says has a bunch of things scattered but without a set schedule, hard to get off the couch or later/next day. When she does get up, it is to snack on things- Chex Mix has been problematic. Realized this is was a problem when went through a whole bag in almost 1 day.  Decided to not buy any more yesterday so now out of the house, and bought more fruits and vegetables so ready to grab and go.      Lost momentum at end of last year.  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  Says more dietary challenges- more stress- had a lot of stress to try to pass the class and make up work; did not get to the gym much and did not eat as healthy (more eating out and more eating snack mixes and protein bar, quick and easy on the go).  Trying to make more meals at home now.    Says feels well that she was drinking a lot of water and would have some water in the afternoon.  Feels doing really well with meals at home.  Making some extra so able to eat the next day.  Water is not as good unless goes to the gym. Helpful to go in the morning.  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     Has 2 weeks until her trip- unsure what to eat with lunches and snacks. Says feeling clothing feel more loose.       NUTRITION HISTORY:    Breakfast: 6:30am: Protein bar  AM: 10am: bowl cereal OR yogurt and cereal   Lunch: 12:30-1pm: leftovers   PM: chex mix OR fruits and vegetables   Dinner: 6pm: burger on skinny roll and salad OR salmon  and carrot noodles  Snacks: chex mix or fruits and vegetables   Beverages: Coffee 1x/day and Water all day    Misses meals? none  Eats out:  2-3 meals/per week for lunch and dinner    Previous diet education:  Yes     Food allergies/intolerances: legumes and peanuts, soy and citrus and diet coke     Diet is high in: carbs and calories at some sittings  Diet is low in: calcium, fiber, fruits and vegetables    EXERCISE: Personal training 2-3x/week, trying to get closer to 3 times a week.     SOCIO/ECONOMIC:   Lives with: spouse    MEDICATIONS:  Current Outpatient Medications   Medication     albuterol 90 MCG/ACT inhaler     beclomethasone (QVAR) 80 MCG/ACT Inhaler     bisacodyl (DULCOLAX) 5 MG EC tablet     cetirizine (ZYRTEC) 10 MG tablet     EPINEPHrine (EPIPEN JR) 0.15 MG/0.3ML injection     ferrous sulfate (FEROSUL) 325 (65 Fe) MG tablet     fluticasone (VERAMYST) 27.5 MCG/SPRAY spray     lisdexamfetamine (VYVANSE) 20 MG capsule     omeprazole (PRILOSEC) 40 MG capsule     sertraline (ZOLOFT) 100 MG tablet     No current facility-administered medications for this visit.        LABS:  Last Basic Metabolic Panel:  Lab Results   Component Value Date     10/03/2018      Lab Results   Component Value Date    POTASSIUM 4.1 10/03/2018     Lab Results   Component Value Date    CHLORIDE 105 10/03/2018     Lab Results   Component Value Date    VIGNESH 8.4 10/03/2018     Lab Results   Component Value Date    CO2 28 10/03/2018     Lab Results   Component Value Date    BUN 15 10/03/2018     Lab Results   Component Value Date    CR 0.79 10/03/2018     Lab Results   Component Value Date     10/03/2018       ANTHROPOMETRICS:  Vitals: Wt 109.9 kg (242 lb 3.2 oz)   BMI 39.09 kg/m    Body mass index is 39.09 kg/m .      Wt Readings from Last 5 Encounters:   06/13/19 109.7 kg (241 lb 12.8 oz)   05/20/19 109.8 kg (242 lb)   04/24/19 110.3 kg (243 lb 3.2 oz)   03/06/19 115.1 kg (253 lb 12.8 oz)   02/01/19 116.4 kg (256 lb 9.6  oz)       Weight Change: lost 1 lbs in the past 2 months.    ESTIMATED KCAL REQUIREMENTS:  2160 kcal per day    NUTRITION DIAGNOSIS: Excessive energy intake related to higher calorie snacks as evidenced by diet discussion and slower weight loss rate.    NUTRITION INTERVENTION:  Nutrition Prescription: Energy Intake: 1800 kcal/day for weight loss  Education given to support: general nutrition guidelines, weight reduction, consistent meals, free foods, artificial sweeteners, fat modification, exercise, fiber, behavior modification, portion control and heart healthy diet  Motivational Interviewing    Discussion: Commended Kim for identifying problematic behavior that is slowing weight loss and making changes to support eating healthier foods and cutting back on calories.  She noticed she was eating more higher calorie chips/snack food instead of fruits and vegetables due to availability so stopped purchasing chex mix and instead bought more fruits and vegetables.  Says needs to work on preparing them so easy to grab and go.  Encouraged her to try to shop off list and/or go to store after gym or when not hungry to help make it easier to choose the healthier fruits and vegetables.     Commended her on going to the gym and making more meals at home. She feels this is going really well. Suggested plate method as way to balance meals when traveling. She also plans to bring some healthy and safe snacks (for her food allergies) and has plan to stop at grocery store to help buy local food and healthy snacks on her trip.  She denies any other questions/concerns or problem areas.  Pt verbalized understanding of concepts discussed and recommendations provided.       PATIENT'S BEHAVIOR CHANGE GOALS:   See Patient Instructions for patient stated behavior change goals. AVS was not printed but sent per [employer] message per patient request.    MONITOR / EVALUATE:  RD will monitor/evaluate:  Progress toward meeting stated  nutrition-related goals  Weight change    FOLLOW-UP:  Call RD with questions/concerns.   Follow-up appointment scheduled on 8/21/19.     Sara Barraza RD, LD, CDE   Time spent in minutes: 35 minutes  Encounter: Individual

## 2019-06-19 NOTE — PATIENT INSTRUCTIONS
1. Prep fruits and vegetables after buying so ready to grab and go for a snack.  Make sure to have crunchy veggies available each week (carrots and peppers).    2. Go shopping after gym and/or when feeling full. Avoid purchasing problem foods    FOLLOW UP: Wednesday, August 21st at 11am at [facility]    Sara Barraza RD, MICHELLE, CDE   [phone]

## 2019-08-01 ENCOUNTER — OFFICE VISIT (OUTPATIENT)
Dept: OBGYN | Facility: CLINIC | Age: 44
End: 2019-08-01
Attending: NURSE PRACTITIONER
Payer: COMMERCIAL

## 2019-08-01 VITALS
WEIGHT: 245.5 LBS | BODY MASS INDEX: 39.46 KG/M2 | HEIGHT: 66 IN | SYSTOLIC BLOOD PRESSURE: 119 MMHG | DIASTOLIC BLOOD PRESSURE: 79 MMHG | HEART RATE: 67 BPM

## 2019-08-01 DIAGNOSIS — N92.1 MENORRHAGIA WITH IRREGULAR CYCLE: ICD-10-CM

## 2019-08-01 DIAGNOSIS — Z01.419 ENCOUNTER FOR GYNECOLOGICAL EXAMINATION WITHOUT ABNORMAL FINDING: Primary | ICD-10-CM

## 2019-08-01 DIAGNOSIS — Z12.39 BREAST CANCER SCREENING: ICD-10-CM

## 2019-08-01 DIAGNOSIS — Z13.29 SCREENING FOR THYROID DISORDER: ICD-10-CM

## 2019-08-01 DIAGNOSIS — D50.0 IRON DEFICIENCY ANEMIA DUE TO CHRONIC BLOOD LOSS: ICD-10-CM

## 2019-08-01 DIAGNOSIS — Z12.4 SCREENING FOR MALIGNANT NEOPLASM OF CERVIX: ICD-10-CM

## 2019-08-01 LAB
HGB BLD-MCNC: 9.9 G/DL (ref 11.7–15.7)
PROLACTIN SERPL-MCNC: 4 UG/L (ref 3–27)
TSH SERPL DL<=0.005 MIU/L-ACNC: 1.51 MU/L (ref 0.4–4)

## 2019-08-01 PROCEDURE — 87624 HPV HI-RISK TYP POOLED RSLT: CPT | Performed by: NURSE PRACTITIONER

## 2019-08-01 PROCEDURE — 36415 COLL VENOUS BLD VENIPUNCTURE: CPT | Performed by: NURSE PRACTITIONER

## 2019-08-01 PROCEDURE — 87591 N.GONORRHOEAE DNA AMP PROB: CPT | Performed by: NURSE PRACTITIONER

## 2019-08-01 PROCEDURE — 87491 CHLMYD TRACH DNA AMP PROBE: CPT | Performed by: NURSE PRACTITIONER

## 2019-08-01 PROCEDURE — 85018 HEMOGLOBIN: CPT | Performed by: NURSE PRACTITIONER

## 2019-08-01 PROCEDURE — 84443 ASSAY THYROID STIM HORMONE: CPT | Performed by: NURSE PRACTITIONER

## 2019-08-01 PROCEDURE — 84146 ASSAY OF PROLACTIN: CPT | Performed by: NURSE PRACTITIONER

## 2019-08-01 PROCEDURE — G0145 SCR C/V CYTO,THINLAYER,RESCR: HCPCS | Performed by: NURSE PRACTITIONER

## 2019-08-01 PROCEDURE — G0463 HOSPITAL OUTPT CLINIC VISIT: HCPCS | Mod: ZF

## 2019-08-01 ASSESSMENT — ANXIETY QUESTIONNAIRES
4. TROUBLE RELAXING: NOT AT ALL
GAD7 TOTAL SCORE: 0
7. FEELING AFRAID AS IF SOMETHING AWFUL MIGHT HAPPEN: NOT AT ALL
1. FEELING NERVOUS, ANXIOUS, OR ON EDGE: NOT AT ALL
6. BECOMING EASILY ANNOYED OR IRRITABLE: NOT AT ALL
7. FEELING AFRAID AS IF SOMETHING AWFUL MIGHT HAPPEN: NOT AT ALL
1. FEELING NERVOUS, ANXIOUS, OR ON EDGE: NOT AT ALL
2. NOT BEING ABLE TO STOP OR CONTROL WORRYING: NOT AT ALL
GAD7 TOTAL SCORE: 0
GAD7 TOTAL SCORE: 0
6. BECOMING EASILY ANNOYED OR IRRITABLE: NOT AT ALL
5. BEING SO RESTLESS THAT IT IS HARD TO SIT STILL: NOT AT ALL
3. WORRYING TOO MUCH ABOUT DIFFERENT THINGS: NOT AT ALL
2. NOT BEING ABLE TO STOP OR CONTROL WORRYING: NOT AT ALL
7. FEELING AFRAID AS IF SOMETHING AWFUL MIGHT HAPPEN: NOT AT ALL
3. WORRYING TOO MUCH ABOUT DIFFERENT THINGS: NOT AT ALL
5. BEING SO RESTLESS THAT IT IS HARD TO SIT STILL: NOT AT ALL

## 2019-08-01 ASSESSMENT — MIFFLIN-ST. JEOR: SCORE: 1785.33

## 2019-08-01 ASSESSMENT — ENCOUNTER SYMPTOMS
HOT FLASHES: 0
DECREASED LIBIDO: 1

## 2019-08-01 ASSESSMENT — PATIENT HEALTH QUESTIONNAIRE - PHQ9: 5. POOR APPETITE OR OVEREATING: NOT AT ALL

## 2019-08-01 NOTE — PATIENT INSTRUCTIONS
Schedule pelvic ultrasound at the .  You will be called with the result of your ultrasound and labs when available.    Go to the lab to have your prolactin and thyroid function tested, as well as a repeat hemoglobin.    Pap smear and STD testing was done today.    If results are all normal, consider Mirena IUD, Nexplanon, Nuvaring, or pills for management of heavy periods.

## 2019-08-01 NOTE — LETTER
8/1/2019       RE: Kim Solomon  [address]  [facility] [address]     Dear Colleague,    Thank you for referring your patient, Kim Solomon, to the [facility] at [facility]. Please see a copy of my visit note below.    Progress Note    SUBJECTIVE:  Kim Solomon is an 44 year old  No obstetric history on file., who requests a breast and pelvic exam.  Patient is followed by DR. Donnelly for primary care. She alsorecently ws seen by a nutritionist for prediabetes and weight management.     Concerns today include:     Patient has been experiencing increased menstrual bleeding in the past year. She started her menstrual cycle on 6/11 and used 8-10 super sized tampons in 48 hours. Additionally, she wears Thinx underwear for periods, as she often leaks through super plus tampons. She has painful cramps that occur with the heavy bleeding. However, the bleeding and cramps resolve by the third day of her cycle. In the past, she would have a 5 day cycle of even flow. Of note, she is anemic, but has been taking iron supplements. She notes not feeling as tired as she once was. Additionally, she was on hormonal birth control pills 4 years ago, but ceased use, due to unpleasant side effects. She did not consider an IUD in the past, as she was undecided on having children at the time it was discussed with her. She has been considering a hysterectomy for permanent treatment, as she and her  are now certain they don't want to have children    hgb was 10.1 on 6/17/2019  Last TSH done in 2015    Sexual Hx:   - Monogamous relationship with  x 20 yrs  - Open to STD screening    Menstrual History:  Menstrual History 9/18/2015 6/16/2016 2/17/2017 8/1/2019 8/1/2019   LAST MENSTRUAL PERIOD 9/4/2015 5/28/2016 2/7/2017 7/28/2019 -   Menarche Age - - - - 15   Period Cycle (Days) - - - - 28   Period Duration (Days) - - - - 5   Method of Contraception - - - - None    Period Pattern - - - - Regular   Menstrual Flow - - - - Heavy   Menstrual Control - - - - Tampon   Dysmenorrhea - - - - Moderate   PMS Symptoms - - - - Cramping   Reviewed Today - - - - Yes       Last pap smear: 4/2016 NIL at outside clinic  History of abnormal Pap smear: NO - age 30- 65 PAP every 3 years recommended  NO - age 30-65 PAP every 5 years with negative HPV co-testing recommended    Mammogram current: no (patient will schedule)  - Maternal grandmother as only relative with breast cancer    HISTORY:  Prescription Medications as of 8/1/2019       Rx Number Disp Refills Start End Last Dispensed Date Next Fill Date Owning Pharmacy    cetirizine (ZYRTEC) 10 MG tablet            Sig: Take 10 mg by mouth daily.    Class: Historical    Route: Oral    lisdexamfetamine (VYVANSE) 20 MG capsule  30 capsule 0 9/29/2011        Sig: Take 1 capsule by mouth daily.    Route: Oral    omeprazole (PRILOSEC) 40 MG capsule  90 capsule 3 9/19/2018    [facility] [id number] [place] [address]    Sig: Take 1 capsule (40 mg) by mouth daily    Class: E-Prescribe    Route: Oral    sertraline (ZOLOFT) 100 MG tablet            Sig: Take 100 mg by mouth daily    Class: Historical    Route: Oral    albuterol 90 MCG/ACT inhaler            Sig: Inhale 2 puffs into the lungs every 6 hours as needed.    Class: Historical    Route: Inhalation    beclomethasone (QVAR) 80 MCG/ACT Inhaler  1 Inhaler 11 5/16/2016    [facility] [id number] [place] [address]    Sig: Inhale 2 puffs into the lungs 2 times daily    Class: E-Prescribe    Route: Inhalation    bisacodyl (DULCOLAX) 5 MG EC tablet  90 tablet 1 6/13/2019    [facility] [address] [place] [address]    Sig: Take 1 tablet (5 mg) by mouth daily as needed for constipation    Class: E-Prescribe    Route: Oral    EPINEPHrine (EPIPEN JR) 0.15 MG/0.3ML injection            Sig: Inject 0.15 mg into the muscle as needed.      Class:  Historical    Route: Intramuscular    ferrous sulfate (FEROSUL) 325 (65 Fe) MG tablet  100 tablet 1 2/1/2019    [id number] - [place], [place] - [address] [facility]    Sig: Take 1 tablet (325 mg) by mouth daily (with breakfast)    Class: OTC    Route: Oral    fluticasone (VERAMYST) 27.5 MCG/SPRAY spray            Sig: Spray 2 sprays into both nostrils daily    Class: Historical    Route: Both Nostrils        Allergies   Allergen Reactions     Diphtheria Toxoid Hives     Medrol [Methylprednisolone Sodium Succinate] Hives     Per allergy testing. Allergic to all oral steroids. OK with topical and inhaled.     Orange Fruit      Peanuts [Nuts]      All legumes and related oils     Penicillins Hives     Prednisone Hives     Immunization History   Administered Date(s) Administered     Influenza (IIV3) PF 10/01/2015, 10/18/2016     TD (ADULT, 7+) 02/01/2019       OB History   No data available     Past Medical History:   Diagnosis Date     ADD (attention deficit disorder)     on [facility]se, [facility]     Asthma      Depression      GERD (gastroesophageal reflux disease)      Impaired fasting blood sugar      Iron deficiency     heavy menses     Obesity      SAADIA (obstructive sleep apnea) 10/15    mild, AHI  5.5, has mandibular adv device     Past Surgical History:   Procedure Laterality Date     ANTROSTOMY NASAL  6/21/2011    Procedure:ANTROSTOMY NASAL; Bilateral Maxillary  Antrostomy; Surgeon:JOSELUIS MISHRA; Location: OR     ENDOSCOPIC SINUS SURGERY  6/21/2011    Procedure:ENDOSCOPIC SINUS SURGERY; Bilateral Endoscopic Sinus Surgery Anterior Ethmoidectomy; Surgeon:JOSELUIS MISHRA; [facility]     EYE SURGERY       LAPAROSCOPIC CHOLECYSTECTOMY WITH CHOLANGIOGRAMS  9/10/2012    Procedure: LAPAROSCOPIC CHOLECYSTECTOMY WITH CHOLANGIOGRAMS;  Laparoscopic Cholecystectomy,  ;  Surgeon: Juancho Jim MD;  Location:  OR     Family History   Problem Relation Age of Onset      Heart Failure Maternal Grandmother      Breast Cancer Maternal Grandmother      Rheumatoid Arthritis Mother      Hypertension Mother      C.A.D. Mother 69     Heart Failure Mother         on O2 in Assisted Living     Endometrial Cancer Mother         dx 3/18     Parkinsonism Father               Social History     Socioeconomic History     Marital status:      Spouse name: None     Number of children: None     Years of education: None     Highest education level: None   Occupational History     None   Social Needs     Financial resource strain: None     Food insecurity:     Worry: None     Inability: None     Transportation needs:     Medical: None     Non-medical: None   Tobacco Use     Smoking status: Never Smoker     Smokeless tobacco: Never Used   Substance and Sexual Activity     Alcohol use: No     Comment: occasionally     Drug use: No     Sexual activity: Yes     Partners: Male   Lifestyle     Physical activity:     Days per week: None     Minutes per session: None     Stress: None   Relationships     Social connections:     Talks on phone: None     Gets together: None     Attends Adventism service: None     Active member of club or organization: None     Attends meetings of clubs or organizations: None     Relationship status: None     Intimate partner violence:     Fear of current or ex partner: None     Emotionally abused: None     Physically abused: None     Forced sexual activity: None   Other Topics Concern     Parent/sibling w/ CABG, MI or angioplasty before 65F 55M? Not Asked   Social History Narrative    ,  [employer] and [employer] in [employer]     is in IT sales    Has 3 Labradors            Last pap: , normal, no abnormal in past ([facility])        How much exercise per week? 3x week    How much calcium per day? In food      How much caffeine per day? 2 cups    How much vitamin D per day?  "sunshine    Do you/your family wear seatbelts?  Yes    Do you/your family use safety helmets? Yes    Do you/your family use sunscreen? Yes    Do you/your family keep firearms in the home? Yes    Do you/your family have a smoke detector(s)? Yes        Do you feel safe in your home? Yes    Has anyone ever touched you in an unwanted manner? No     Explain      Sandra Montoya    8/1/2019       ROS  SNOW-7 SCORE 8/1/2019 8/1/2019   Total Score - 0 (minimal anxiety)   Total Score 0 0       PHQ-2 Score:     PHQ-2 ( 1999 Pfizer) 8/1/2019 6/13/2019   Q1: Little interest or pleasure in doing things 0 0   Q2: Feeling down, depressed or hopeless 0 0   PHQ-2 Score 0 0         EXAM:  Blood pressure 119/79, pulse 67, height 1.676 m (5' 6\"), weight 111.4 kg (245 lb 8 oz), last menstrual period 07/28/2019, not currently breastfeeding. Body mass index is 39.62 kg/m .  General appearance: Pleasant female in no acute distress.     BREAST EXAM:  Breast: Without visible skin changes. No dimpling or lesions seen.   Breasts supple, non-tender with palpation, no dominant mass, nodularity, or nipple discharge noted bilaterally. Axillary nodes negative.      PELVIC EXAM:  EG/BUS: Normal genital architecture without lesions, erythema or abnormal secretions Bartholin's, Urethra, [place]'s normal   Urethral meatus: normal   Urethra: no masses, tenderness, or scarring   Bladder: no masses or tenderness   Vagina: moist, pink, rugae with creamy, white and odorless secretions  Cervix: Nulliparous, pink, no lesions, no CMT, and friable with pap  Uterus: small, smooth, firm, mobile w/o pain and exam findings compromised by body habitus  Adnexa: not palpable, exam findings compromised by body habitus      ASSESSMENT:  Encounter Diagnoses   Name Primary?     Menorrhagia with irregular cycle      Screening for malignant neoplasm of cervix      Encounter for gynecological examination without abnormal finding Yes     Breast cancer screening      Screening " for thyroid disorder         PLAN:   Orders Placed This Encounter   Procedures     Pelvic and Breast Exam Procedure []     Pap Smear Exam []     US Pelvic Complete with Transvaginal     Mammogram Screening Bilateral Digital [BAL931]     Pap imaged thin layer screen with HPV - recommended age 30 - 65 years (select HPV order below)     HPV High Risk Types DNA Cervical     TSH with free T4 reflex     Prolactin     Hemoglobin     Schedule pelvic ultrasound and complete labs ordered today to evaluate for cause of menorrhagia. Reviewed options for managing this including Mirena IUD, Neplanon, NuvaRing, COCs, or Depo provera. Reviewed benefits and risks of each. Most interested in a Mirena IUD. Patient will be called with results of imaging and labs and plan for care determined at that time.  Counseled on importance of managing bleeding given that it is leading to iron deficiency anemia.     Pap smear with HPV done today  Patient to schedule mammogram    Continue care with PCP.    Return in one year/PRN for preventive care or problems/concerns.     Verbalized understanding and agreement with visit plan.      Again, thank you for allowing me to participate in the care of your patient.      Sincerely,    Archana Martínez, ELVIA CNP

## 2019-08-01 NOTE — PROGRESS NOTES
Progress Note    SUBJECTIVE:  Kim Solomon is an 44 year old  No obstetric history on file., who requests a breast and pelvic exam.  Patient is followed by DR. Donnelly for primary care. She alsorecently ws seen by a nutritionist for prediabetes and weight management.     Concerns today include:     Patient has been experiencing increased menstrual bleeding in the past year. She started her menstrual cycle on 6/11 and used 8-10 super sized tampons in 48 hours. Additionally, she wears Thinx underwear for periods, as she often leaks through super plus tampons. She has painful cramps that occur with the heavy bleeding. However, the bleeding and cramps resolve by the third day of her cycle. In the past, she would have a 5 day cycle of even flow. Of note, she is anemic, but has been taking iron supplements. She notes not feeling as tired as she once was. Additionally, she was on hormonal birth control pills 4 years ago, but ceased use, due to unpleasant side effects. She did not consider an IUD in the past, as she was undecided on having children at the time it was discussed with her. She has been considering a hysterectomy for permanent treatment, as she and her  are now certain they don't want to have children    hgb was 10.1 on 6/17/2019  Last TSH done in 2015    Sexual Hx:   - Monogamous relationship with  x 20 yrs  - Open to STD screening    Menstrual History:  Menstrual History 9/18/2015 6/16/2016 2/17/2017 8/1/2019 8/1/2019   LAST MENSTRUAL PERIOD 9/4/2015 5/28/2016 2/7/2017 7/28/2019 -   Menarche Age - - - - 15   Period Cycle (Days) - - - - 28   Period Duration (Days) - - - - 5   Method of Contraception - - - - None   Period Pattern - - - - Regular   Menstrual Flow - - - - Heavy   Menstrual Control - - - - Tampon   Dysmenorrhea - - - - Moderate   PMS Symptoms - - - - Cramping   Reviewed Today - - - - Yes       Last pap smear: 4/2016 NIL at outside clinic  History of abnormal Pap smear: NO - age  30- 65 PAP every 3 years recommended  NO - age 30-65 PAP every 5 years with negative HPV co-testing recommended    Mammogram current: no (patient will schedule)  - Maternal grandmother as only relative with breast cancer    HISTORY:  Prescription Medications as of 8/1/2019       Rx Number Disp Refills Start End Last Dispensed Date Next Fill Date Owning Pharmacy    cetirizine (ZYRTEC) 10 MG tablet            Sig: Take 10 mg by mouth daily.    Class: Historical    Route: Oral    lisdexamfetamine (VYVANSE) 20 MG capsule  30 capsule 0 9/29/2011        Sig: Take 1 capsule by mouth daily.    Route: Oral    omeprazole (PRILOSEC) 40 MG capsule  90 capsule 3 9/19/2018    [facility] [address] [place] [address]    Sig: Take 1 capsule (40 mg) by mouth daily    Class: E-Prescribe    Route: Oral    sertraline (ZOLOFT) 100 MG tablet            Sig: Take 100 mg by mouth daily    Class: Historical    Route: Oral    albuterol 90 MCG/ACT inhaler            Sig: Inhale 2 puffs into the lungs every 6 hours as needed.    Class: Historical    Route: Inhalation    beclomethasone (QVAR) 80 MCG/ACT Inhaler  1 Inhaler 11 5/16/2016    [facility] [address] [place] [address]    Sig: Inhale 2 puffs into the lungs 2 times daily    Class: E-Prescribe    Route: Inhalation    bisacodyl (DULCOLAX) 5 MG EC tablet  90 tablet 1 6/13/2019    [facility] [address] [place] [address]    Sig: Take 1 tablet (5 mg) by mouth daily as needed for constipation    Class: E-Prescribe    Route: Oral    EPINEPHrine (EPIPEN JR) 0.15 MG/0.3ML injection            Sig: Inject 0.15 mg into the muscle as needed.      Class: Historical    Route: Intramuscular    ferrous sulfate (FEROSUL) 325 (65 Fe) MG tablet  100 tablet 1 2/1/2019    [facility] [address] [place] [address]    Sig: Take 1 tablet (325 mg) by mouth daily (with breakfast)    Class: OTC    Route: Oral    fluticasone  (VERAMYST) 27.5 MCG/SPRAY spray            Sig: Spray 2 sprays into both nostrils daily    Class: Historical    Route: Both Nostrils        Allergies   Allergen Reactions     Diphtheria Toxoid Hives     Medrol [Methylprednisolone Sodium Succinate] Hives     Per allergy testing. Allergic to all oral steroids. OK with topical and inhaled.     Orange Fruit      Peanuts [Nuts]      All legumes and related oils     Penicillins Hives     Prednisone Hives     Immunization History   Administered Date(s) Administered     Influenza (IIV3) PF 10/01/2015, 10/18/2016     TD (ADULT, 7+) 02/01/2019       OB History   No data available     Past Medical History:   Diagnosis Date     ADD (attention deficit disorder)     on vyvanse, prescribed by [facility] Jenkins at [facility]     Asthma      Depression      GERD (gastroesophageal reflux disease)      Impaired fasting blood sugar      Iron deficiency     heavy menses     Obesity      SAADIA (obstructive sleep apnea) 10/15    mild, AHI  5.5, has mandibular adv device     Past Surgical History:   Procedure Laterality Date     ANTROSTOMY NASAL  6/21/2011    Procedure:ANTROSTOMY NASAL; Bilateral Maxillary  Antrostomy; Surgeon:JOSELUIS MISHRA; [facility]     ENDOSCOPIC SINUS SURGERY  6/21/2011    Procedure:ENDOSCOPIC SINUS SURGERY; Bilateral Endoscopic Sinus Surgery Anterior Ethmoidectomy; Surgeon:JOSELUIS MISHRA; [facility]     EYE SURGERY       LAPAROSCOPIC CHOLECYSTECTOMY WITH CHOLANGIOGRAMS  9/10/2012    Procedure: LAPAROSCOPIC CHOLECYSTECTOMY WITH CHOLANGIOGRAMS;  Laparoscopic Cholecystectomy,  ;  Surgeon: Juancho Jim MD;  [facility]     Family History   Problem Relation Age of Onset     Heart Failure Maternal Grandmother      Breast Cancer Maternal Grandmother      Rheumatoid Arthritis Mother      Hypertension Mother      C.A.D. Mother 69     Heart Failure Mother         on O2 in Assisted Living     Endometrial Cancer Mother         dx 3/18     Parkinsonism  Father               Social History     Socioeconomic History     Marital status:      Spouse name: None     Number of children: None     Years of education: None     Highest education level: None   Occupational History     None   Social Needs     Financial resource strain: None     Food insecurity:     Worry: None     Inability: None     Transportation needs:     Medical: None     Non-medical: None   Tobacco Use     Smoking status: Never Smoker     Smokeless tobacco: Never Used   Substance and Sexual Activity     Alcohol use: No     Comment: occasionally     Drug use: No     Sexual activity: Yes     Partners: Male   Lifestyle     Physical activity:     Days per week: None     Minutes per session: None     Stress: None   Relationships     Social connections:     Talks on phone: None     Gets together: None     Attends Anglican service: None     Active member of club or organization: None     Attends meetings of clubs or organizations: None     Relationship status: None     Intimate partner violence:     Fear of current or ex partner: None     Emotionally abused: None     Physically abused: None     Forced sexual activity: None   Other Topics Concern     Parent/sibling w/ CABG, MI or angioplasty before 65F 55M? Not Asked   Social History Narrative    ,  [employer] and [employer] in [employer]     is in IT sales    Has 3 Labradors            Last pap: , normal, no abnormal in past ([facility])        How much exercise per week? 3x week    How much calcium per day? In food      How much caffeine per day? 2 cups    How much vitamin D per day? sunshine    Do you/your family wear seatbelts?  Yes    Do you/your family use safety helmets? Yes    Do you/your family use sunscreen? Yes    Do you/your family keep firearms in the home? Yes    Do you/your family have a smoke detector(s)? Yes        Do you feel safe in your home?  "Yes    Has anyone ever touched you in an unwanted manner? No     Explain      Sandra Montoya    8/1/2019       ROS  SNOW-7 SCORE 8/1/2019 8/1/2019   Total Score - 0 (minimal anxiety)   Total Score 0 0       PHQ-2 Score:     PHQ-2 ( 1999 Pfizer) 8/1/2019 6/13/2019   Q1: Little interest or pleasure in doing things 0 0   Q2: Feeling down, depressed or hopeless 0 0   PHQ-2 Score 0 0         EXAM:  Blood pressure 119/79, pulse 67, height 1.676 m (5' 6\"), weight 111.4 kg (245 lb 8 oz), last menstrual period 07/28/2019, not currently breastfeeding. Body mass index is 39.62 kg/m .  General appearance: Pleasant female in no acute distress.     BREAST EXAM:  Breast: Without visible skin changes. No dimpling or lesions seen.   Breasts supple, non-tender with palpation, no dominant mass, nodularity, or nipple discharge noted bilaterally. Axillary nodes negative.      PELVIC EXAM:  EG/BUS: Normal genital architecture without lesions, erythema or abnormal secretions Bartholin's, Urethra, [place]'s normal   Urethral meatus: normal   Urethra: no masses, tenderness, or scarring   Bladder: no masses or tenderness   Vagina: moist, pink, rugae with creamy, white and odorless secretions  Cervix: Nulliparous, pink, no lesions, no CMT, and friable with pap  Uterus: small, smooth, firm, mobile w/o pain and exam findings compromised by body habitus  Adnexa: not palpable, exam findings compromised by body habitus      ASSESSMENT:  Encounter Diagnoses   Name Primary?     Menorrhagia with irregular cycle      Screening for malignant neoplasm of cervix      Encounter for gynecological examination without abnormal finding Yes     Breast cancer screening      Screening for thyroid disorder         PLAN:   Orders Placed This Encounter   Procedures     Pelvic and Breast Exam Procedure []     Pap Smear Exam []     US Pelvic Complete with Transvaginal     Mammogram Screening Bilateral Digital [[id number]]     Pap imaged thin layer screen with HPV " - recommended age 30 - 65 years (select HPV order below)     HPV High Risk Types DNA Cervical     TSH with free T4 reflex     Prolactin     Hemoglobin     Schedule pelvic ultrasound and complete labs ordered today to evaluate for cause of menorrhagia. Reviewed options for managing this including Mirena IUD, Neplanon, NuvaRing, COCs, or Depo provera. Reviewed benefits and risks of each. Most interested in a Mirena IUD. Patient will be called with results of imaging and labs and plan for care determined at that time.  Counseled on importance of managing bleeding given that it is leading to iron deficiency anemia.     Pap smear with HPV done today  Patient to schedule mammogram    Continue care with PCP.    Return in one year/PRN for preventive care or problems/concerns.     Verbalized understanding and agreement with visit plan.          Answers for HPI/ROS submitted by the patient on 8/1/2019   SNOW 7 TOTAL SCORE: 0

## 2019-08-02 LAB
C TRACH DNA SPEC QL NAA+PROBE: NEGATIVE
N GONORRHOEA DNA SPEC QL NAA+PROBE: NEGATIVE
SPECIMEN SOURCE: NORMAL
SPECIMEN SOURCE: NORMAL

## 2019-08-02 ASSESSMENT — ANXIETY QUESTIONNAIRES: GAD7 TOTAL SCORE: 0

## 2019-08-05 LAB
COPATH REPORT: NORMAL
PAP: NORMAL

## 2019-08-07 LAB
FINAL DIAGNOSIS: NORMAL
HPV HR 12 DNA CVX QL NAA+PROBE: NEGATIVE
HPV16 DNA SPEC QL NAA+PROBE: NEGATIVE
HPV18 DNA SPEC QL NAA+PROBE: NEGATIVE
SPECIMEN DESCRIPTION: NORMAL
SPECIMEN SOURCE CVX/VAG CYTO: NORMAL

## 2019-08-08 ENCOUNTER — TELEPHONE (OUTPATIENT)
Dept: OBGYN | Facility: CLINIC | Age: 44
End: 2019-08-08

## 2019-08-08 NOTE — TELEPHONE ENCOUNTER
Notified Kim of her lab results as follows:  Hgb 9.9 (L)   TSH: WNL   Prolactin: WNL  Pap smear: NIL, HPV negative  Gonorrhea & chlamydia tests negative    Reminded patient to schedule pelvic ultrasound to evaluate for cause of menorrhagia. She will be called with the results of this when available and further plan for care will be determined at that time.    Recommended iron supplement Ferrous sulfate 325mg PO 1-2 times per day, to be taken on empty stomach or with orange juice.  If causes GI side effects, may take with food.     Kim expressed understanding and agreement with the plan for care.    Archana Martínez, DNP, APRN, WHNP

## 2019-08-21 ENCOUNTER — ALLIED HEALTH/NURSE VISIT (OUTPATIENT)
Dept: NUTRITION | Facility: CLINIC | Age: 44
End: 2019-08-21
Payer: COMMERCIAL

## 2019-08-21 VITALS — BODY MASS INDEX: 39.41 KG/M2 | WEIGHT: 244.2 LBS

## 2019-08-21 DIAGNOSIS — E66.3 OVERWEIGHT: Primary | ICD-10-CM

## 2019-08-21 DIAGNOSIS — R73.03 PRE-DIABETES: ICD-10-CM

## 2019-08-21 PROCEDURE — 97803 MED NUTRITION INDIV SUBSEQ: CPT

## 2019-08-21 NOTE — PATIENT INSTRUCTIONS
1. Pre-portion the amount of snack food you are eating in the afternoon to 1 serving.    2. Once you move into new house, utilize breakfast nook and avoid eating in front of TV (distracted eating).    3. Check into celiac disease?  Could be one reason for low iron levels    4. Keep in mind fortified cereals - Total or Cream of Wheat.    FOLLOW UP: As needed if you struggling after your move.    Sara Barraza RD, LD, CDE   [phone]

## 2019-08-21 NOTE — PROGRESS NOTES
Medical Nutrition Therapy  Visit Type:Reassessment and intervention    Kim Solomon presents today for MNT and education related to prediabetes and weight management.   She is accompanied by self.     ASSESSMENT:   Patient comments/concerns relating to nutrition: Says weight was down but without having anything scheduled for a couple weeks and was doing boredom eating.  Thinks getting back to school will help her get back on track.    Says also put an offer on a house and it was accepted; plans to close in October. Excited about the house for both her and her .  Says will have an acre for the dogs and near a walking path.  Also needs to pack up classroom.      Says iron levels continue to decrease.  Has been eating more beef than chicken and dark green veggies.  Says distracted,tired and headaches from the low iron. Started an iron supplement- [employer] Blood Builder with iron and vitamin C.     NUTRITION HISTORY:    Breakfast: low-sugar granola and Greek yogurt and berries  AM: none  Lunch: egg sandwich from [employer] and [employer] OR salad with spring greens mix, carrots, cucumbers, olives, peppers and easy on dressing  AM: apples, nectarines, peaches AND chips, Cheez-its, popcorns and crackers  Dinner: fish with cauliflower rice and veggies (asparagus/green beans) at [employer] OR [facility] no bun with salad or green beans OR burgers with broccoli  Snacks: none OR water  Beverages: Coffee or tea during the day and Water/carbonated water all day. Rare soda.    Misses meals? rare  Eats out:  3-4 meals/per week, mostly for dinner    Previous diet education:  Yes     Food allergies/intolerances: beans, lentils, peanuts, peas, soy (ALL legumes), citrus and diet (tolerates green beans)    Diet is high in: calories  Diet is low in: fruits    EXERCISE: Gym 2-3 times a week for weight training x 30 and cardio x 30 minutes. Sometimes misses cardio due to time constraints but will make up for it at home.  Started to pack  and unpack items.     SOCIO/ECONOMIC:   Lives with: spouse    MEDICATIONS:  Current Outpatient Medications   Medication     albuterol 90 MCG/ACT inhaler     beclomethasone (QVAR) 80 MCG/ACT Inhaler     bisacodyl (DULCOLAX) 5 MG EC tablet     cetirizine (ZYRTEC) 10 MG tablet     EPINEPHrine (EPIPEN JR) 0.15 MG/0.3ML injection     ferrous sulfate (FEROSUL) 325 (65 Fe) MG tablet     fluticasone (VERAMYST) 27.5 MCG/SPRAY spray     lisdexamfetamine (VYVANSE) 20 MG capsule     omeprazole (PRILOSEC) 40 MG capsule     sertraline (ZOLOFT) 100 MG tablet     No current facility-administered medications for this visit.        LABS:  Last Basic Metabolic Panel:  Lab Results   Component Value Date     10/03/2018      Lab Results   Component Value Date    POTASSIUM 4.1 10/03/2018     Lab Results   Component Value Date    CHLORIDE 105 10/03/2018     Lab Results   Component Value Date    VIGNESH 8.4 10/03/2018     Lab Results   Component Value Date    CO2 28 10/03/2018     Lab Results   Component Value Date    BUN 15 10/03/2018     Lab Results   Component Value Date    CR 0.79 10/03/2018     Lab Results   Component Value Date     10/03/2018       ANTHROPOMETRICS:  Vitals: Wt 110.8 kg (244 lb 3.2 oz)   LMP 07/28/2019 (Exact Date)   BMI 39.41 kg/m    Body mass index is 39.41 kg/m .      Wt Readings from Last 5 Encounters:   08/01/19 111.4 kg (245 lb 8 oz)   06/19/19 109.9 kg (242 lb 3.2 oz)   06/13/19 109.7 kg (241 lb 12.8 oz)   05/20/19 109.8 kg (242 lb)   04/24/19 110.3 kg (243 lb 3.2 oz)       Weight Change: gained 2 lbs in the past 2 months    ESTIMATED KCAL REQUIREMENTS:  2160 kcal per day    NUTRITION DIAGNOSIS: Overweight/Obesity related to excessive energy intake as evidenced by boredom snacking per patient, weight gain of 2 lbs and BMI >30    NUTRITION INTERVENTION:  Nutrition Prescription: Energy Intake: 1800 kcal/day for weight loss  Pre-portion afternoon snack and limit quantity  Higher iron foods.  Education  given to support: general nutrition guidelines, weight reduction, consistent meals, exercise, dining out/special occasions, fiber, behavior modification, portion control and heart healthy diet  Motivational Interviewing    Discussion: Kim feels that overall things are going well and recent weight gain is due to larger afternoon portions -boredom eating.  Plans to work on portioning snacks and finding even smaller bowls so only takes 1 portion of crackers.  Commended her on staying active and working to increase fiber, fruits and vegetables.  She is excited about the new house they found and plan to move in October to a one story, which she feels with make life easier for her (not as many stairs) and more room to play with her dogs/enjoy the outdoors/yard and will not have to eat in front of the TV anymore since will have a designated eating place.    A1C keeps improving- down to 5.7% on 6/12/19 vs 5.8% on 2/1/19.  Suspect Kim's weight loss, changes to eating to reduce simple sugars and eat more fiber and whole foods and increased activity all likely contributing to improving blood glucose values.     Today, mentions concerns about anemia.  Says iron has worsened so trying to eat more iron-rich foods.  Found a handout from the [employer] so declines wanting more education material today since she doesn't want to pack up the paper.  Encouraged her to call if needs more information or MyChart message.  Reviewed some other higher iron foods (fortified cereals) along with meat and spinach.  She is aware to take iron supplement with vitamin-c rich foods and avoid dairy for better absorption.  She does not recall being checked for celiac disease in the past and indicates she tolerates dairy well but this could be a contributing factor to unexplained low iron value and suggested discussing with MD to order blood work to rule it out.  Pt verbalized understanding of concepts discussed and recommendations  provided.       PATIENT'S BEHAVIOR CHANGE GOALS:   See Patient Instructions for patient stated behavior change goals. AVS was printed and given to patient at today's appointment.    MONITOR / EVALUATE:  RD will monitor/evaluate:  Pertinent Labs  Weight change    FOLLOW-UP:  Follow up with RD as needed.  Patient feels she has what she needs and is on right track so declines wanting to make follow up at this time but says will call after they move if she is struggling.  Call RD with questions/concerns.     Sara Barraza RD, LD, CDE   Time spent in minutes: 40 minutes  Encounter: Individual

## 2019-08-21 NOTE — Clinical Note
Pardeep Bryant,With Kim's worsening iron values, do you think it would be helpful to check for celiac disease or feel mostly due to heavy menses?  She does not have other common symptoms of celiac disease but sometimes if iron values not improving or chronic issue, may be worth checking/rule out since may also could cause issues with iron absorption.  Had also encouraged Kim to discuss with you.Thanks,Sara Barraza RD, LD, CDE

## 2019-08-31 ENCOUNTER — MYC MEDICAL ADVICE (OUTPATIENT)
Dept: INTERNAL MEDICINE | Facility: CLINIC | Age: 44
End: 2019-08-31

## 2019-09-23 ENCOUNTER — MYC MEDICAL ADVICE (OUTPATIENT)
Dept: INTERNAL MEDICINE | Facility: CLINIC | Age: 44
End: 2019-09-23

## 2019-09-23 DIAGNOSIS — K21.9 GASTROESOPHAGEAL REFLUX DISEASE, ESOPHAGITIS PRESENCE NOT SPECIFIED: ICD-10-CM

## 2019-09-23 DIAGNOSIS — Z00.00 ROUTINE GENERAL MEDICAL EXAMINATION AT A HEALTH CARE FACILITY: ICD-10-CM

## 2019-09-23 RX ORDER — OMEPRAZOLE 40 MG/1
40 CAPSULE, DELAYED RELEASE ORAL DAILY
Qty: 90 CAPSULE | Refills: 2 | Status: SHIPPED | OUTPATIENT
Start: 2019-09-23 | End: 2020-03-19

## 2019-10-02 ENCOUNTER — HEALTH MAINTENANCE LETTER (OUTPATIENT)
Age: 44
End: 2019-10-02

## 2019-11-19 ENCOUNTER — PRE VISIT (OUTPATIENT)
Dept: INTERNAL MEDICINE | Facility: CLINIC | Age: 44
End: 2019-11-19

## 2019-11-25 ENCOUNTER — OFFICE VISIT (OUTPATIENT)
Dept: INTERNAL MEDICINE | Facility: CLINIC | Age: 44
End: 2019-11-25
Payer: COMMERCIAL

## 2019-11-25 VITALS
SYSTOLIC BLOOD PRESSURE: 128 MMHG | HEART RATE: 65 BPM | DIASTOLIC BLOOD PRESSURE: 81 MMHG | OXYGEN SATURATION: 99 % | TEMPERATURE: 98 F

## 2019-11-25 DIAGNOSIS — L65.9 ALOPECIA: ICD-10-CM

## 2019-11-25 DIAGNOSIS — F33.8 SEASONAL AFFECTIVE DISORDER (H): ICD-10-CM

## 2019-11-25 DIAGNOSIS — D50.9 IRON DEFICIENCY ANEMIA, UNSPECIFIED IRON DEFICIENCY ANEMIA TYPE: ICD-10-CM

## 2019-11-25 DIAGNOSIS — F98.8 ATTENTION DEFICIT DISORDER (ADD) WITHOUT HYPERACTIVITY: Primary | ICD-10-CM

## 2019-11-25 RX ORDER — MONTELUKAST SODIUM 10 MG/1
TABLET ORAL
Refills: 3 | COMMUNITY
Start: 2019-10-22 | End: 2023-10-11

## 2019-11-25 RX ORDER — SERTRALINE HYDROCHLORIDE 100 MG/1
150 TABLET, FILM COATED ORAL DAILY
Qty: 135 TABLET | Refills: 3 | Status: SHIPPED | OUTPATIENT
Start: 2019-11-25 | End: 2020-12-17

## 2019-11-25 RX ORDER — LISDEXAMFETAMINE DIMESYLATE 20 MG/1
20 CAPSULE ORAL DAILY
Qty: 30 CAPSULE | Refills: 0 | Status: SHIPPED | OUTPATIENT
Start: 2019-11-25 | End: 2019-12-20

## 2019-11-25 ASSESSMENT — PAIN SCALES - GENERAL: PAINLEVEL: NO PAIN (0)

## 2019-11-25 NOTE — PATIENT INSTRUCTIONS
[facility] Medication Refill Request Information:  * Please contact your pharmacy regarding ANY request for medication refills.  ** [facility] Prescription Fax = [phone]  * Please allow 3 business days for routine medication refills.  * Please allow 5 business days for controlled substance medication refills.     [facility] Test Result notification information:  *You will be notified with in 7-10 days of your appointment day regarding the results of your test.  If you are on MyChart you will be notified as soon as the provider has reviewed the results and signed off on them.    [facility]: [phone]

## 2019-11-25 NOTE — NURSING NOTE
Chief Complaint   Patient presents with     Medication Request     pt here to discuss medications       Katherine Tom CMA at 5:54 PM on 11/25/2019.

## 2019-11-25 NOTE — PROGRESS NOTES
[facility]  Primary Care Center   Cookie Donnelly MD  11/25/2019      Chief Complaint:   Medication Request       History of Present Illness:   Kim Solomon is a 44 year old female with a history of ADD, asthma, and inflammatory arthritis who presents for evaluation of depression and ADD.     Her psychiatrist discontinued her care because she was late on a copayment. They managed her ADD and depression medications.     Depression: She has Sertraline still but this time of year she usually increases her dose. Since she is no longer seeing her psych clinic she has not started this increased dose yet. She also needs to establish with a new therapist. She has not picked out a new therapist yet.     ADD: Vyvanse is the main issue. 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 She has been on Vyvanse for several years. She was diagnosed with ADD in the mid 1980s while in 5/6th grade. She was diagnosed as ADD inattentive type. No adverse effectsThe first drug she was on was Ritalin, then she was on Dexadrene. She also tried Strattera but it was not as helpful for managing her ADD. As a student she was always day dreaming and not focused on class. 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 As as student, the quality of her work was poor. In high school and college she worked on coping mechanisms which help with daily activities like keeping track of keys or drinking excessive coffee. 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 Denies side effects from Vyvanse, it usually covers her for 10-11 hours. Denies insomnia and tachycardia. While moving her house and classroom she noticed she was getting distracted toward mid-day and thinks she might benefit from an increased dose of Vyvanse, however she is willing to stay at the 20mg dose for now.     Anemia, alopecia: her menses are still heavy. She has to schedule a pelvic US. She also  started losing hair and there is a bald spot on her scalp. Her concern was that the anemia was related to Celiac disease. She reduced gluten significantly and noticed her hair is growing back. There is still hair falling out of her head. She cut wheat out of her diet in the past when it was suspected she had a gluten allergy. No family history of Celiac to her knowledge. Several of her family members have autoimmune issues.     She denies recent illness. Her allergies have been an issue while redoing the floors in her house, espescially if she forgets to wear a mask.     Review of Systems:   A full 10-pt Review of Systems was performed, verified and is negative except as documented in the HPI.  All health questionnaires were reviewed, verified and relevant information documented above.      Active Medications:     Current Outpatient Medications:      albuterol 90 MCG/ACT inhaler, Inhale 2 puffs into the lungs every 6 hours as needed., Disp: , Rfl:      beclomethasone (QVAR) 80 MCG/ACT Inhaler, Inhale 2 puffs into the lungs 2 times daily, Disp: 1 Inhaler, Rfl: 11     cetirizine (ZYRTEC) 10 MG tablet, Take 10 mg by mouth daily., Disp: , Rfl:      EPINEPHrine (EPIPEN JR) 0.15 MG/0.3ML injection, Inject 0.15 mg into the muscle as needed.  , Disp: , Rfl:      fluticasone (VERAMYST) 27.5 MCG/SPRAY spray, Spray 2 sprays into both nostrils daily, Disp: , Rfl:      lisdexamfetamine (VYVANSE) 20 MG capsule, Take 1 capsule (20 mg) by mouth daily, Disp: 30 capsule, Rfl: 0     montelukast (SINGULAIR) 10 MG tablet, TAKE 1 TABLET BY MOUTH EVERY DAY IN THE MORNING., Disp: , Rfl: 3     omeprazole (PRILOSEC) 40 MG DR capsule, Take 1 capsule (40 mg) by mouth daily, Disp: 90 capsule, Rfl: 2     sertraline (ZOLOFT) 100 MG tablet, Take 1.5 tablets (150 mg) by mouth daily Hold until needed, Disp: 135 tablet, Rfl: 3      Allergies:   Diphtheria toxoid; Medrol [methylprednisolone sodium succinate]; No clinical screening - see comments;  Orange fruit; Peanuts [nuts]; Penicillins; and Prednisone      Past Medical History:  ADD   Asthma  Depression   GERD  Obesity    SAADIA   Allergic rhinitis   Iron deficiency anemia   Contact dermatis and other eczema, due to  unspecified cause   Inflammatory arthritis      Past Surgical History:  Antrostomy nasal   Endoscopic sinus surgery   Eye surgery   Laparoscopic cholecystectomy with cholangiograms      Family History:   Maternal grandmother - heart failure, breast cancer   Mother - rheumatoid arthritis, hypertension, coronary artery disease, heat failure, endometrial cancer  Father - parkinsonism      Social History:     Non smoker  Bought a new house in October, they are replacing the carpet with wood isela. Also more space for the dogs.     Physical Exam:   /81 (BP Location: Right arm, Patient Position: Sitting, Cuff Size: Adult Large)   Pulse 65   Temp 98  F (36.7  C) (Oral)   SpO2 99%    Constitutional: Alert, oriented, pleasant, no acute distress  Head: Normocephalic, atraumatic  Eyes: Extra-ocular movements intact, no scleral icterus  Neck: Supple, no lymphadenopathy  Cardiovascular: Regular rate and rhythm, no murmurs, rubs or gallops, peripheral pulses full/symmetric  Respiratory: Good air movement bilaterally, lungs clear, no wheezes/rales/rhonchi  Musculoskeletal: No edema, normal muscle tone, normal gait  Neurologic: Alert and oriented, cranial nerves 2-12 intact.  Skin: No rashes/lesions. 3cm circular patch of alopecia with minimal hair regrowth present in the right frontal scalp  Psychiatric: normal mentation, affect and mood     Assessment and Plan:  Attention deficit disorder (ADD) without hyperactivity  Reviewed patient's diagnosis and treatment history today, she has benefited from stimulant use without adverse effects. [place] controlled substance agreement with patient, and pending urine drug screen I have agreed to prescribe her medications.   - lisdexamfetamine (VYVANSE)  20 MG capsule  Dispense: 30 capsule; Refill: 0  - sertraline (ZOLOFT) 100 MG tablet  Dispense: 135 tablet; Refill: 3  - Urine drug screen    Seasonal affective disorder (H)  She takes an increased dose of Sertraline in the winter.     Alopecia  Possibly related to iron deficiency anemia, though the patchy nature raises suspicion for other etiolgies such as alopecia areata. We will check labs as below.   - DERMATOLOGY REFERRAL    Iron deficiency anemia, unspecified iron deficiency anemia type  Likely secondary to menorrhagia, she will have a US for further evaluation per gynecology. She would benefit from definitive management. She does not have symptoms or family history suggestive of Celiac but it is not unreasonablet to check serologies.   - CBC with platelets  - Ferritin  - Tissue transglutaminase mayra IgA and IgG  - Endomysial Antibody IgA by IFA  - IgA  - TSH with free T4 reflex  - Anti Nuclear Mayra IgG by IFA with Reflex    Follow-up: No follow-ups on file.      Scribe Disclosure:  I, Naldo Sharma, am serving as a scribe to document services personally performed by Cookie Donnelly MD at this visit, based upon the provider's statements to me. All documentation has been reviewed by the aforementioned provider prior to being entered into the official medical record.    Portions of this medical record were completed by a scribe. UPON MY REVIEW AND AUTHENTICATION BY ELECTRONIC SIGNATURE, this confirms (a) I performed the applicable clinical services, and (b) the record is accurate.   Cookie Donnelly MD  Internal Medicine    40 min spent face to face, of which >50% time spent on counseling/coordinating care exclusive of any procedure time

## 2019-11-25 NOTE — LETTER
[facility]  11/25/19    Patient: Kim Solomon  YOB: 1975  Medical Record Number: [id number]  CSN: [id number]                                                                              Non-opioid Controlled Substance Agreement    I understand that my care provider has prescribed a controlled substance to help manage my condition(s). I am taking this medicine to help me function or work. I know this is strong medicine, and that it can cause serious side effects. Controlled substances can be sedating, addicting and may cause a dependency on the drug. They can affect my ability to drive or think, and cause depression. They need to be taken exactly as prescribed. Combining controlled substances with certain medicines or chemicals (such as cocaine, sedatives and tranquilizers, sleeping pills, meth) can be dangerous or even fatal. Also, if I stop controlled substances suddenly, I may have severe withdrawal symptoms.  If not helpful, I may be asked to stop them.    The risks, benefits, and side effects of these medicine(s) were explained to me. I agree that:    1. I will take part in other treatments as advised by my care team. This may be psychiatry or counseling, physical therapy, behavioral therapy, group treatment or a referral to a pain clinic. I will reduce or stop my medicine when my care team tells me to do so.  2. I will take my medicines as prescribed. I will not change the dose or schedule unless my care team tells me to. There will be no refills if I  run out early.   I may be contactedwithout warning and asked to complete a urine drug test or pill count at any time.   3. I will keep all my appointments, and understand this is part of the monitoring of controlled substances. My care team may require an office visit for EVERY controlled substance refill. If I miss appointments or don t follow instructions, my care team may stop my medicine.  4. I will  not ask other providers to prescribe controlled substances, and I will not accept controlled substances from other people. If I need another prescribed controlled substance for a new reason, I will tell my care team within 1 business day.  5. I will use one pharmacy to fill all of my controlled substance prescriptions, and it is up to me to make sure that I do not run out of my medicines on weekends or holidays. If my care team is willing to refill my controlled substance prescription without a visit, I must request refills only during office hours, refills may take up to 3 days to process, and it may take up to 5 to 7 days for my medicine to be mailed and ready at my pharmacy. Prescriptions will not be mailed anywhere except my pharmacy.    6. I am responsible for my prescriptions. If the medicine/prescription is lost or stolen, it will not be replaced. I also agree not to share controlled substance medicines with anyone.              [facility]  11/25/19  Patient:  Kim Solomon  YOB: 1975  Medical Record Number: [id number]  [facility]: [id number]    7. I agree to not use ANY illegal or recreational drugs. This includes marijuana, cocaine, bath salts or other drugs. I agree not to use alcohol unless my care team says I may. I agree to give urine samples whenever asked. If I don t give a urine sample, the care team may stop my medicine.    8. If I enroll in the [place] Medical Marijuana program, I will tell my care team. I will also sign an agreement to share my medical records with my care team.    9. I will bring in my list of medicines (or my medicine bottles) each time I come to the clinic.   10. I will tell my care team right away if I become pregnant or have a new medical problem treated outside of my regular clinic.  11. I understand that this medicine can affect my thinking and judgment. It may be unsafe for me to drive, use machinery and do dangerous  tasks. I will not do any of these things until I know how the medicine affects me. If my dose changes, I will wait to see how it affects me. I will contact my care team if I have concerns about medicine side effects.    I understand that if I do not follow any of the conditions above, my prescriptions or treatment may be stopped.      I agree that my provider, clinic care team, and pharmacy may work with any city, state or federal law enforcement agency that investigates the misuse, sale, or other diversion of my controlled medicine. I will allow my provider to discuss my care with or share a copy of this agreement with any other treating provider, pharmacy or emergency room where I receive care. I agree to give up (waive) any right of privacy or confidentiality with respect to these consents.   I have read this agreement and have asked questions about anything I did not understand.    ____________________________________________________    ____________  ________  Patient signature                                                         Date      Time    ____________________________________________________     ____________  ________  Witness                                                          Date      Time    ____________________________________________________  Provider signature

## 2019-11-26 LAB
ACETAMINOPHEN QUAL: NEGATIVE
AMANTADINE: NEGATIVE
AMITRIPTYLINE QUAL: NEGATIVE
AMOXAPINE: NEGATIVE
AMPHETAMINES QUAL: NEGATIVE
ATROPINE: NEGATIVE
BENZODIAZ UR QL: NEGATIVE
BUPROPION QUAL: NEGATIVE
CAFFEINE QUAL: POSITIVE
CANNABINOIDS UR QL SCN: NEGATIVE
CARBAMAZEPINE QUAL: NEGATIVE
CHLORPHENIRAMINE: NEGATIVE
CHLORPROMAZINE: NEGATIVE
CITALOPRAM QUAL: NEGATIVE
CLOMIPRAMINE QUAL: NEGATIVE
COCAINE QUAL: NEGATIVE
COCAINE UR QL: NEGATIVE
CODEINE QUAL: NEGATIVE
DESIPRAMINE QUAL: NEGATIVE
DEXTROMETHORPHAN: NEGATIVE
DIPHENHYDRAMINE: NEGATIVE
DOXEPIN/METABOLITE: NEGATIVE
DOXYLAMINE: NEGATIVE
EPHEDRINE OR PSEUDO: NEGATIVE
FENTANYL QUAL: NEGATIVE
FLUOXETINE AND METAB: NEGATIVE
HYDROCODONE QUAL: NEGATIVE
HYDROMORPHONE QUAL: NEGATIVE
IBUPROFEN QUAL: NEGATIVE
IMIPRAMINE QUAL: NEGATIVE
KETAMINE QUAL: NEGATIVE
LAMOTRIGINE QUAL: NEGATIVE
LIDOCAIN SPEC QL: NEGATIVE
LOXAPINE: NEGATIVE
MAPROTYLINE: NEGATIVE
MDMA QUAL: NEGATIVE
MEPERIDINE QUAL: NEGATIVE
METHAMPHETAMINE: NEGATIVE
METHODONE QUAL: NEGATIVE
MIRTAZAPINE QUAL: NEGATIVE
MORPHINE QUAL: NEGATIVE
NICOTINE: NEGATIVE
NORTRIPTYLINE QUAL: NEGATIVE
OLANZAPINE QUAL: NEGATIVE
OPIATES UR QL SCN: NEGATIVE
OXYCODONE QUAL: NEGATIVE
PENTAZOCINE: NEGATIVE
PHENCYCLIDINE QUAL: NEGATIVE
PHENTERMINE: NEGATIVE
PROPOFOL QUAL: NEGATIVE
PROPOXPHENE QUAL: NEGATIVE
PROPRANOLOL QUAL: NEGATIVE
PYRILAMINE: NEGATIVE
QUETIAPINE METAB QUAL: NEGATIVE
SALICYLATE QUAL: NEGATIVE
SERTRALINE QUAL: NEGATIVE
THEOBROMINE: POSITIVE
TOPIRAMATE QUAL: NEGATIVE
TRAMADOL QUAL: NEGATIVE
TRIMIPRAMINE QUAL: NEGATIVE
VENLAFAXINE QUAL: NEGATIVE

## 2019-11-26 NOTE — PROGRESS NOTES
Diagnosed 5/6th grade ADD inattentive  Started on ritalin, dexadrin through college, tried strattera  Daydreaming, decreased quality  Coping strategies  Grading, feedback, tangents, paperwork  Coffee x 8 cups/day    No adverse effects

## 2019-11-29 NOTE — TELEPHONE ENCOUNTER
FUTURE VISIT INFORMATION      FUTURE VISIT INFORMATION:    Date: 12.11.19    Time: 2:30    Location: [facility]  REFERRAL INFORMATION:    Referring provider:  Dr. Cookie Donnelly    Referring providers clinic:  Primary Care    Reason for visit/diagnosis  New per hpt-bravwa-so, Alopecia, ref-recs in EPIC    RECORDS REQUESTED FROM:       Clinic name Comments Records Status Imaging Status   [facility] 11.25.19 Dr. Donnelly [facility] N/A

## 2019-12-11 ENCOUNTER — OFFICE VISIT (OUTPATIENT)
Dept: DERMATOLOGY | Facility: CLINIC | Age: 44
End: 2019-12-11
Attending: INTERNAL MEDICINE
Payer: COMMERCIAL

## 2019-12-11 ENCOUNTER — PRE VISIT (OUTPATIENT)
Dept: DERMATOLOGY | Facility: CLINIC | Age: 44
End: 2019-12-11

## 2019-12-11 DIAGNOSIS — D50.9 IRON DEFICIENCY ANEMIA, UNSPECIFIED IRON DEFICIENCY ANEMIA TYPE: ICD-10-CM

## 2019-12-11 DIAGNOSIS — L63.9 ALOPECIA AREATA: Primary | ICD-10-CM

## 2019-12-11 LAB
ERYTHROCYTE [DISTWIDTH] IN BLOOD BY AUTOMATED COUNT: 17.8 % (ref 10–15)
FERRITIN SERPL-MCNC: 22 NG/ML (ref 12–150)
HCT VFR BLD AUTO: 31.3 % (ref 35–47)
HGB BLD-MCNC: 9.3 G/DL (ref 11.7–15.7)
MCH RBC QN AUTO: 21.4 PG (ref 26.5–33)
MCHC RBC AUTO-ENTMCNC: 29.7 G/DL (ref 31.5–36.5)
MCV RBC AUTO: 72 FL (ref 78–100)
PLATELET # BLD AUTO: 256 10E9/L (ref 150–450)
RBC # BLD AUTO: 4.35 10E12/L (ref 3.8–5.2)
TSH SERPL DL<=0.005 MIU/L-ACNC: 1.3 MU/L (ref 0.4–4)
WBC # BLD AUTO: 8.9 10E9/L (ref 4–11)

## 2019-12-11 ASSESSMENT — PAIN SCALES - GENERAL: PAINLEVEL: NO PAIN (0)

## 2019-12-11 NOTE — NURSING NOTE
Chief Complaint   Patient presents with     Hair/Scalp Problem     Kim is here today for Hair Loss. Kim notes thinning hair for several years and recently developed some     Elisabeth Vuong LPN

## 2019-12-11 NOTE — PROGRESS NOTES
[facility] Dermatology Note      Dermatology Problem List:  1. Alopecia areata - s/p ILK 12/11/2019    Encounter Date: Dec 11, 2019    CC:  Chief Complaint   Patient presents with     Hair/Scalp Problem     Kim is here today for Hair Loss. Kim notes thinning hair for several years and recently developed some spots on scalp where all the hair has fallen out.        History of Present Illness:  Ms. Kim Solomon is a 44 year old female who presents with hair loss on the occiput and crown of her head. She noticed this in September near the frontal hairline and her stylist noticed more patches on the back of her head during her last haircut. She denies pain or pruritus. She does not note any changes in her eyebrows, eyelashes, nails or body hair.     She has a history of iron deficiency anemia of unknown origin and she eliminated gluten from her diet and reports that she is feeling better, overall. She is still taking iron supplements. No other dietary changes. No medication changes.     She has taken medrol and prednisone in the past but she experienced an allergic reaction (hives). She denies any tongue swelling. She sees an allergist, Dr. Britt, at [facility] and has tolerated some topical steroids. She takes Zyrtec for seasonal allergies.       Past Medical History:   Patient Active Problem List   Diagnosis     Allergic rhinitis     Iron deficiency anemia     Contact dermatitis and other eczema, due to unspecified cause     Undifferentiated inflammatory arthritis (H)     S/P laparoscopic cholecystectomy     Abdominal pain, unspecified abdominal location     SAADIA (obstructive sleep apnea)     Past Medical History:   Diagnosis Date     ADD (attention deficit disorder)     on vyvanse, [facility]     Asthma      Depression      GERD (gastroesophageal reflux disease)      Impaired fasting blood sugar      Iron deficiency     heavy  menses     Obesity      SAADIA (obstructive sleep apnea) 10/15    mild, AHI  5.5, has mandibular adv device     Past Surgical History:   Procedure Laterality Date     ANTROSTOMY NASAL  2011    Procedure:ANTROSTOMY NASAL; Bilateral Maxillary  Antrostomy; Surgeon:JOSELUIS MISHRA; Location:[facility] OR     ENDOSCOPIC SINUS SURGERY  2011    Procedure:ENDOSCOPIC SINUS SURGERY; Bilateral Endoscopic Sinus Surgery Anterior Ethmoidectomy; Surgeon:JOSELUIS MISHRA; Location:[facility] OR     EYE SURGERY       LAPAROSCOPIC CHOLECYSTECTOMY WITH CHOLANGIOGRAMS  9/10/2012    Procedure: LAPAROSCOPIC CHOLECYSTECTOMY WITH CHOLANGIOGRAMS;  Laparoscopic Cholecystectomy,  ;  Surgeon: Juancho Jim MD;  Location: [facility] OR       Social History:  Patient reports that she has never smoked. She has never used smokeless tobacco. She reports that she does not drink alcohol or use drugs.    Family History:  Family History   Problem Relation Age of Onset     Heart Failure Maternal Grandmother      Breast Cancer Maternal Grandmother      Rheumatoid Arthritis Mother      Hypertension Mother      C.A.D. Mother 69     Heart Failure Mother         on O2 in Assisted Living     Endometrial Cancer Mother         dx 3/18     Parkinsonism Father                 Medications:  Current Outpatient Medications   Medication Sig Dispense Refill     albuterol 90 MCG/ACT inhaler Inhale 2 puffs into the lungs every 6 hours as needed.       beclomethasone (QVAR) 80 MCG/ACT Inhaler Inhale 2 puffs into the lungs 2 times daily 1 Inhaler 11     cetirizine (ZYRTEC) 10 MG tablet Take 10 mg by mouth daily.       EPINEPHrine (EPIPEN JR) 0.15 MG/0.3ML injection Inject 0.15 mg into the muscle as needed.         fluticasone (VERAMYST) 27.5 MCG/SPRAY spray Spray 2 sprays into both nostrils daily       lisdexamfetamine (VYVANSE) 20 MG capsule Take 1 capsule (20 mg) by mouth daily 30 capsule 0     montelukast (SINGULAIR) 10 MG tablet TAKE 1 TABLET BY MOUTH EVERY DAY IN THE MORNING.   3     omeprazole (PRILOSEC) 40 MG DR capsule Take 1 capsule (40 mg) by mouth daily 90 capsule 2     sertraline (ZOLOFT) 100 MG tablet Take 1.5 tablets (150 mg) by mouth daily Hold until needed 135 tablet 3       Allergies   Allergen Reactions     Diphtheria Toxoid Hives     Medrol [Methylprednisolone Sodium Succinate] Hives     Per allergy testing. Allergic to all oral steroids. OK with topical and inhaled.     No Clinical Screening - See Comments Hives     Orange Fruit      Peanuts [Nuts]      All legumes and related oils     Penicillins Hives     Prednisone Hives       Review of Systems:  -Constitutional: Otherwise feeling well today, in usual state of health.   -Skin: As above in HPI. No additional skin concerns.    Physical exam:  Vitals: There were no vitals taken for this visit.  GEN: This is a well developed, well-nourished female in no acute distress, in a pleasant mood.    SKIN: Focused examination of the scalp was performed.  - Mirza skin type: I  - 1.0 -1.5 cm alopecia patches lightly pigmented terminal hair centrally on frontal scalp, vertex/crown scalp, and occipital scalp. Positive hair pull test peripherally to alopecia patches.   - No eyebrow or eyelash changes.  - No nail changes.   - No other lesions of concern on areas examined.       Impression/Plan:    1. Alopecia areata  - Stop Zyrtec and start allegra 180 mg tablets as this has shown better results when used with ILK  - Kenalog intralesional injection procedure note: After verbal consent and discussion of risks including but not limited to atrophy, pain, and bruising, time out was performed, the patient underwent positioning and the area was prepped with isopropyl alcohol, 2 total cc of Kenalog 5 mg/cc was injected into 4 site(s) on the scalp. The patient tolerated the procedure well and left the Dermatology clinic in good condition.  - Given the patient's allergy to steroids she was encouraged to wait or come back to clinic if she  develops any hives following ILK    CC Cookie Donnelly MD  [address] [phone] on close of this encounter.    Follow-up in 4-6 weeks for repeat ILK, earlier for new or changing lesions.     Staff Involved:  Scribe/Staff    Scribe Disclosure:   I, Donn Cohen, am serving as a scribe to document services personally performed by Parish Easley MD, based on data collection and the provider's statements to me.     Provider Disclosure:   The documentation recorded by the scribe accurately reflects the services I personally performed and the decisions made by me.    Parish Easley MD    Department of Dermatology  [facility]: Phone: [phone], Fax:[phone]  [facility]: Phone: [phone], Fax: [phone]

## 2019-12-11 NOTE — LETTER
12/11/2019     RE: Kim Solomon  [address] [place] [address]     Dear Colleague,    Thank you for referring your patient, Kim Solomon, to the [facility] DERMATOLOGY at [facility]. Please see a copy of my visit note below.    [facility] Dermatology Note      Dermatology Problem List:  1. Alopecia areata - s/p ILK 12/11/2019    Encounter Date: Dec 11, 2019    CC:  Chief Complaint   Patient presents with     Hair/Scalp Problem     Kim is here today for Hair Loss. Kim notes thinning hair for several years and recently developed some spots on scalp where all the hair has fallen out.        History of Present Illness:  Ms. Kim Solomon is a 44 year old female who presents with hair loss on the occiput and crown of her head. She noticed this in September near the frontal hairline and her stylist noticed more patches on the back of her head during her last haircut. She denies pain or pruritus. She does not note any changes in her eyebrows, eyelashes, nails or body hair.     She has a history of iron deficiency anemia of unknown origin and she eliminated gluten from her diet and reports that she is feeling better, overall. She is still taking iron supplements. No other dietary changes. No medication changes.     She has taken medrol and prednisone in the past but she experienced an allergic reaction (hives). She denies any tongue swelling. She sees an allergist, Dr. Britt, at [facility] and has tolerated some topical steroids. She takes Zyrtec for seasonal allergies.       Past Medical History:   Patient Active Problem List   Diagnosis     Allergic rhinitis     Iron deficiency anemia     Contact dermatitis and other eczema, due to unspecified cause     Undifferentiated inflammatory arthritis (H)     S/P laparoscopic cholecystectomy     Abdominal pain, unspecified abdominal location     SAADIA (obstructive sleep apnea)     Past  Medical History:   Diagnosis Date     ADD (attention deficit disorder)     on vyvanse, prescribed by Joselyn Jenkins at [facility]     Asthma      Depression      GERD (gastroesophageal reflux disease)      Impaired fasting blood sugar      Iron deficiency     heavy menses     Obesity      SAADIA (obstructive sleep apnea) 10/15    mild, AHI  5.5, has mandibular adv device     Past Surgical History:   Procedure Laterality Date     ANTROSTOMY NASAL  2011    Procedure:ANTROSTOMY NASAL; Bilateral Maxillary  Antrostomy; Surgeon:JOSELUIS MISHRA; Location:[facility] OR     ENDOSCOPIC SINUS SURGERY  2011    Procedure:ENDOSCOPIC SINUS SURGERY; Bilateral Endoscopic Sinus Surgery Anterior Ethmoidectomy; Surgeon:JOSELUIS MISHRA; Location: OR     EYE SURGERY       LAPAROSCOPIC CHOLECYSTECTOMY WITH CHOLANGIOGRAMS  9/10/2012    Procedure: LAPAROSCOPIC CHOLECYSTECTOMY WITH CHOLANGIOGRAMS;  Laparoscopic Cholecystectomy,  ;  Surgeon: Juancho Jim MD;  Location:  OR       Social History:  Patient reports that she has never smoked. She has never used smokeless tobacco. She reports that she does not drink alcohol or use drugs.    Family History:  Family History   Problem Relation Age of Onset     Heart Failure Maternal Grandmother      Breast Cancer Maternal Grandmother      Rheumatoid Arthritis Mother      Hypertension Mother      C.A.D. Mother 69     Heart Failure Mother         on O2 in Assisted Living     Endometrial Cancer Mother         dx 3/18     Parkinsonism Father                 Medications:  Current Outpatient Medications   Medication Sig Dispense Refill     albuterol 90 MCG/ACT inhaler Inhale 2 puffs into the lungs every 6 hours as needed.       beclomethasone (QVAR) 80 MCG/ACT Inhaler Inhale 2 puffs into the lungs 2 times daily 1 Inhaler 11     cetirizine (ZYRTEC) 10 MG tablet Take 10 mg by mouth daily.       EPINEPHrine (EPIPEN JR) 0.15 MG/0.3ML injection Inject 0.15 mg into the muscle as needed.          fluticasone (VERAMYST) 27.5 MCG/SPRAY spray Spray 2 sprays into both nostrils daily       lisdexamfetamine (VYVANSE) 20 MG capsule Take 1 capsule (20 mg) by mouth daily 30 capsule 0     montelukast (SINGULAIR) 10 MG tablet TAKE 1 TABLET BY MOUTH EVERY DAY IN THE MORNING.  3     omeprazole (PRILOSEC) 40 MG DR capsule Take 1 capsule (40 mg) by mouth daily 90 capsule 2     sertraline (ZOLOFT) 100 MG tablet Take 1.5 tablets (150 mg) by mouth daily Hold until needed 135 tablet 3       Allergies   Allergen Reactions     Diphtheria Toxoid Hives     Medrol [Methylprednisolone Sodium Succinate] Hives     Per allergy testing. Allergic to all oral steroids. OK with topical and inhaled.     No Clinical Screening - See Comments Hives     Orange Fruit      Peanuts [Nuts]      All legumes and related oils     Penicillins Hives     Prednisone Hives       Review of Systems:  -Constitutional: Otherwise feeling well today, in usual state of health.   -Skin: As above in HPI. No additional skin concerns.    Physical exam:  Vitals: There were no vitals taken for this visit.  GEN: This is a well developed, well-nourished female in no acute distress, in a pleasant mood.    SKIN: Focused examination of the scalp was performed.  - Mirza skin type: I  - 1.0 -1.5 cm alopecia patches lightly pigmented terminal hair centrally on frontal scalp, vertex/crown scalp, and occipital scalp. Positive hair pull test peripherally to alopecia patches.   - No eyebrow or eyelash changes.  - No nail changes.   - No other lesions of concern on areas examined.       Impression/Plan:    1. Alopecia areata  - Stop Zyrtec and start allegra 180 mg tablets as this has shown better results when used with ILK  - Kenalog intralesional injection procedure note: After verbal consent and discussion of risks including but not limited to atrophy, pain, and bruising, time out was performed, the patient underwent positioning and the area was prepped with  isopropyl alcohol, 2 total cc of Kenalog 5 mg/cc was injected into 4 site(s) on the scalp. The patient tolerated the procedure well and left the Dermatology clinic in good condition.  - Given the patient's allergy to steroids she was encouraged to wait or come back to clinic if she develops any hives following ILK    CC Cookie Donnelly MD  [address] [phone] on close of this encounter.    Follow-up in 4-6 weeks for repeat ILK, earlier for new or changing lesions.     Staff Involved:  Scribe/Staff    Scribe Disclosure:   I, Donn Cohen, am serving as a scribe to document services personally performed by Parish Easley MD, based on data collection and the provider's statements to me.     Provider Disclosure:   The documentation recorded by the scribe accurately reflects the services I personally performed and the decisions made by me.    Parish Easley MD    Department of Dermatology  [facility]: Phone: [phone], Fax:[phone]  [facility]: Phone: [phone], Fax: [phone]

## 2019-12-11 NOTE — NURSING NOTE
Drug Administration Record    Prior to injection, verified patient identity using patient's name and date of birth.  Due to injection administration, patient instructed to remain in clinic for 15 minutes  afterwards, and to report any adverse reaction to me immediately.    Drug Name: triamcinolone acetonide(kenalog)  Dose: 2mL of triamcinolone 5mg/mL, 10mg dose  Route administered: ID  NDC #: [id number]: Kenalog-10 (8508-4445-47)  Amount of waste(mL):4  Reason for waste: Multi dose vial    LOT #: [id number]  SITE: body  : Mob[employer]  EXPIRATION DATE: MAY2021

## 2019-12-11 NOTE — PATIENT INSTRUCTIONS
1. Start allegra 180 mg (1 tablet) once daily.   2. Injections today and repeated every 4-6 weeks.

## 2019-12-12 LAB
ANA SER QL IF: NEGATIVE
IGA SERPL-MCNC: 372 MG/DL (ref 70–380)

## 2019-12-13 LAB
ENDOMYSIUM IGA TITR SER IF: NORMAL {TITER}
TTG IGA SER-ACNC: 1 U/ML
TTG IGG SER-ACNC: 1 U/ML

## 2020-01-16 ENCOUNTER — OFFICE VISIT (OUTPATIENT)
Dept: DERMATOLOGY | Facility: CLINIC | Age: 45
End: 2020-01-16
Payer: COMMERCIAL

## 2020-01-16 DIAGNOSIS — L21.9 DERMATITIS, SEBORRHEIC: Primary | ICD-10-CM

## 2020-01-16 DIAGNOSIS — L63.9 ALOPECIA AREATA: ICD-10-CM

## 2020-01-16 RX ORDER — FLUOCINONIDE TOPICAL SOLUTION USP, 0.05% 0.5 MG/ML
SOLUTION TOPICAL
Qty: 60 ML | Refills: 3 | Status: SHIPPED | OUTPATIENT
Start: 2020-01-16 | End: 2022-06-01

## 2020-01-16 ASSESSMENT — PAIN SCALES - GENERAL: PAINLEVEL: NO PAIN (0)

## 2020-01-16 NOTE — PROGRESS NOTES
"[facility] Dermatology Note      Dermatology Problem List:  1. Alopecia areata - s/p ILK 12/11/2019, 1/16/2020  - lidex 0.05% solution BIW for scalp  - OTC dandruff shampoo    Encounter Date: Jan 16, 2020    CC:  Chief Complaint   Patient presents with     Derm Problem     Patient is here today for a follow up.        History of Present Illness:  Ms. Kim Solomon is a 44 year old female who presents as follow-up for alopecia areata. At last visit, received ILK injections and started on allegra 180 mg PO qdaily. She has noticed some regrowth on the frontal scalp and significant regrowth elsewhere on the scalp. She does not some pruritus and scale, particularly on the frontal scalp area. She uses a \"regular\" shampoo. No eyebrow or eyelash loss. Health otherwise stable. No other skin concerns.     Past Medical History:   Patient Active Problem List   Diagnosis     Allergic rhinitis     Iron deficiency anemia     Contact dermatitis and other eczema, due to unspecified cause     Undifferentiated inflammatory arthritis (H)     S/P laparoscopic cholecystectomy     Abdominal pain, unspecified abdominal location     SAADIA (obstructive sleep apnea)     Past Medical History:   Diagnosis Date     ADD (attention deficit disorder)     on vyvanse, [facility] by [facility]     Asthma      Depression      GERD (gastroesophageal reflux disease)      Impaired fasting blood sugar      Iron deficiency     heavy menses     Obesity      SAADIA (obstructive sleep apnea) 10/15    mild, AHI  5.5, has mandibular adv device     Past Surgical History:   Procedure Laterality Date     ANTROSTOMY NASAL  6/21/2011    Procedure:ANTROSTOMY NASAL; Bilateral Maxillary  Antrostomy; Surgeon:JOSELUIS MISHRA; Location:[facility] OR     ENDOSCOPIC SINUS SURGERY  6/21/2011    Procedure:ENDOSCOPIC SINUS SURGERY; Bilateral Endoscopic Sinus Surgery Anterior Ethmoidectomy; Surgeon:JOSELUIS MISHRA; Location:[facility] OR     EYE SURGERY   "     LAPAROSCOPIC CHOLECYSTECTOMY WITH CHOLANGIOGRAMS  9/10/2012    Procedure: LAPAROSCOPIC CHOLECYSTECTOMY WITH CHOLANGIOGRAMS;  Laparoscopic Cholecystectomy,  ;  Surgeon: Juancho iJm MD;  Location:  OR       Social History:  Patient reports that she has never smoked. She has never used smokeless tobacco. She reports that she does not drink alcohol or use drugs.    Family History:  Family History   Problem Relation Age of Onset     Heart Failure Maternal Grandmother      Breast Cancer Maternal Grandmother      Rheumatoid Arthritis Mother      Hypertension Mother      C.A.D. Mother 69     Heart Failure Mother         on O2 in Assisted Living     Endometrial Cancer Mother         dx 3/18     Parkinsonism Father                 Medications:  Current Outpatient Medications   Medication Sig Dispense Refill     albuterol 90 MCG/ACT inhaler Inhale 2 puffs into the lungs every 6 hours as needed.       beclomethasone (QVAR) 80 MCG/ACT Inhaler Inhale 2 puffs into the lungs 2 times daily 1 Inhaler 11     cetirizine (ZYRTEC) 10 MG tablet Take 10 mg by mouth daily.       EPINEPHrine (EPIPEN JR) 0.15 MG/0.3ML injection Inject 0.15 mg into the muscle as needed.         fluticasone (VERAMYST) 27.5 MCG/SPRAY spray Spray 2 sprays into both nostrils daily       lisdexamfetamine (VYVANSE) 20 MG capsule Take 1 capsule (20 mg) by mouth daily Okay to fill  for  start 30 capsule 0     montelukast (SINGULAIR) 10 MG tablet TAKE 1 TABLET BY MOUTH EVERY DAY IN THE MORNING.  3     omeprazole (PRILOSEC) 40 MG DR capsule Take 1 capsule (40 mg) by mouth daily 90 capsule 2     sertraline (ZOLOFT) 100 MG tablet Take 1.5 tablets (150 mg) by mouth daily Hold until needed 135 tablet 3       Allergies   Allergen Reactions     Diphtheria Toxoid Hives     Medrol [Methylprednisolone Sodium Succinate] Hives     Per allergy testing. Allergic to all oral steroids. OK with topical and inhaled.     No Clinical Screening - See  Comments Hives     Orange Fruit      Peanuts [Nuts]      All legumes and related oils     Penicillins Hives     Prednisone Hives       Review of Systems:  -Constitutional: Otherwise feeling well today, in usual state of health.   -Skin: As above in HPI. No additional skin concerns.    Physical exam:  Vitals: There were no vitals taken for this visit.  GEN: This is a well developed, well-nourished female in no acute distress, in a pleasant mood.    SKIN: Focused examination of the scalp was performed.  - Mirza skin type: II  - 1.0 -1.5 cm alopecia patch with lightly pigmented terminal hair centrally on right medial frontal scalp with central regrowth as compared to last visit. Still with positive pull test peripherally.   - Mild pink erythema and scale on the frontotemporal scalp.   - No eyebrow or eyelash changes.  - No nail changes.   - No other lesions of concern on areas examined.       Impression/Plan:    1. Alopecia areata, improvement with ILK. Will continue ILK injections with addition of home topical steroid as below for field treatment and pruritus.     - Continue allegra 180 mg PO qdaily  - Start lidex 0.05% solution BIW 10-15 drops before bed time.  - Start OTC zinc or tar-based shampoo TIW    PROCEDURE NOTE  - Kenalog intralesional injection procedure note: After verbal consent and discussion of risks including but not limited to atrophy, pain, and bruising, time out was performed, the patient underwent positioning and the area was prepped with isopropyl alcohol, 0.9 total cc of Kenalog 5 mg/cc was injected into 1 alopecia patch on the right frontal scalp. The patient tolerated the procedure well and left the Dermatology clinic in good condition.    CC Cookie Donnelly MD  [address] [place] [phone] on close of this encounter.    Follow-up in 4-6 weeks for repeat ILK, earlier for new or changing lesions.     Staff Involved:  Staff only    Parish Easley MD  Pronouns:  he/him/his    Department of Dermatology  [facility]: Phone: [phone], Fax:[phone]  [facility]: Phone: [phone], Fax: [phone]

## 2020-01-16 NOTE — LETTER
"1/16/2020       RE: Kim Solomon  [address] [place] [address]     Dear Colleague,    Thank you for referring your patient, Kim Solomon, to the [facility] DERMATOLOGY at [facility]. Please see a copy of my visit note below.    [facility] Dermatology Note      Dermatology Problem List:  1. Alopecia areata - s/p ILK 12/11/2019, 1/16/2020  - lidex 0.05% solution BIW for scalp  - OTC dandruff shampoo    Encounter Date: Jan 16, 2020    CC:  Chief Complaint   Patient presents with     Derm Problem     Patient is here today for a follow up.        History of Present Illness:  Ms. Kim Solomon is a 44 year old female who presents as follow-up for alopecia areata. At last visit, received ILK injections and started on allegra 180 mg PO qdaily. She has noticed some regrowth on the frontal scalp and significant regrowth elsewhere on the scalp. She does not some pruritus and scale, particularly on the frontal scalp area. She uses a \"regular\" shampoo. No eyebrow or eyelash loss. Health otherwise stable. No other skin concerns.     Past Medical History:   Patient Active Problem List   Diagnosis     Allergic rhinitis     Iron deficiency anemia     Contact dermatitis and other eczema, due to unspecified cause     Undifferentiated inflammatory arthritis (H)     S/P laparoscopic cholecystectomy     Abdominal pain, unspecified abdominal location     SAADIA (obstructive sleep apnea)     Past Medical History:   Diagnosis Date     ADD (attention deficit disorder)     on vyvanse, [facility]     Asthma      Depression      GERD (gastroesophageal reflux disease)      Impaired fasting blood sugar      Iron deficiency     heavy menses     Obesity      SAADIA (obstructive sleep apnea) 10/15    mild, AHI  5.5, has mandibular adv device     Past Surgical History:   Procedure Laterality Date     ANTROSTOMY NASAL  6/21/2011    " Procedure:ANTROSTOMY NASAL; Bilateral Maxillary  Antrostomy; Surgeon:JOSELUIS MISHRA; Location:[facility] OR     ENDOSCOPIC SINUS SURGERY  2011    Procedure:ENDOSCOPIC SINUS SURGERY; Bilateral Endoscopic Sinus Surgery Anterior Ethmoidectomy; Surgeon:JOSELUIS MISHRA; Location:[facility] OR     EYE SURGERY       LAPAROSCOPIC CHOLECYSTECTOMY WITH CHOLANGIOGRAMS  9/10/2012    Procedure: LAPAROSCOPIC CHOLECYSTECTOMY WITH CHOLANGIOGRAMS;  Laparoscopic Cholecystectomy,  ;  Surgeon: Juancho Jim MD;  Location: [facility] OR       Social History:  Patient reports that she has never smoked. She has never used smokeless tobacco. She reports that she does not drink alcohol or use drugs.    Family History:  Family History   Problem Relation Age of Onset     Heart Failure Maternal Grandmother      Breast Cancer Maternal Grandmother      Rheumatoid Arthritis Mother      Hypertension Mother      C.A.D. Mother 69     Heart Failure Mother         on O2 in Assisted Living     Endometrial Cancer Mother         dx 3/18     Parkinsonism Father                 Medications:  Current Outpatient Medications   Medication Sig Dispense Refill     albuterol 90 MCG/ACT inhaler Inhale 2 puffs into the lungs every 6 hours as needed.       beclomethasone (QVAR) 80 MCG/ACT Inhaler Inhale 2 puffs into the lungs 2 times daily 1 Inhaler 11     cetirizine (ZYRTEC) 10 MG tablet Take 10 mg by mouth daily.       EPINEPHrine (EPIPEN JR) 0.15 MG/0.3ML injection Inject 0.15 mg into the muscle as needed.         fluticasone (VERAMYST) 27.5 MCG/SPRAY spray Spray 2 sprays into both nostrils daily       lisdexamfetamine (VYVANSE) 20 MG capsule Take 1 capsule (20 mg) by mouth daily Okay to fill  for  start 30 capsule 0     montelukast (SINGULAIR) 10 MG tablet TAKE 1 TABLET BY MOUTH EVERY DAY IN THE MORNING.  3     omeprazole (PRILOSEC) 40 MG DR capsule Take 1 capsule (40 mg) by mouth daily 90 capsule 2     sertraline (ZOLOFT) 100 MG tablet Take 1.5 tablets (150  mg) by mouth daily Hold until needed 135 tablet 3       Allergies   Allergen Reactions     Diphtheria Toxoid Hives     Medrol [Methylprednisolone Sodium Succinate] Hives     Per allergy testing. Allergic to all oral steroids. OK with topical and inhaled.     No Clinical Screening - See Comments Hives     Orange Fruit      Peanuts [Nuts]      All legumes and related oils     Penicillins Hives     Prednisone Hives       Review of Systems:  -Constitutional: Otherwise feeling well today, in usual state of health.   -Skin: As above in HPI. No additional skin concerns.    Physical exam:  Vitals: There were no vitals taken for this visit.  GEN: This is a well developed, well-nourished female in no acute distress, in a pleasant mood.    SKIN: Focused examination of the scalp was performed.  - Mirza skin type: II  - 1.0 -1.5 cm alopecia patch with lightly pigmented terminal hair centrally on right medial frontal scalp with central regrowth as compared to last visit. Still with positive pull test peripherally.   - Mild pink erythema and scale on the frontotemporal scalp.   - No eyebrow or eyelash changes.  - No nail changes.   - No other lesions of concern on areas examined.       Impression/Plan:    1. Alopecia areata, improvement with ILK. Will continue ILK injections with addition of home topical steroid as below for field treatment and pruritus.     - Continue allegra 180 mg PO qdaily  - Start lidex 0.05% solution BIW 10-15 drops before bed time.  - Start OTC zinc or tar-based shampoo TIW    PROCEDURE NOTE  - Kenalog intralesional injection procedure note: After verbal consent and discussion of risks including but not limited to atrophy, pain, and bruising, time out was performed, the patient underwent positioning and the area was prepped with isopropyl alcohol, 0.9 total cc of Kenalog 5 mg/cc was injected into 1 alopecia patch on the right frontal scalp. The patient tolerated the procedure well and left the  Dermatology clinic in good condition.    CC Cookie Donnelly MD  [address] [address] on close of this encounter.    Follow-up in 4-6 weeks for repeat ILK, earlier for new or changing lesions.     Staff Involved:  Staff only    Parish Easley MD  Pronouns: he/him/his    Department of Dermatology  [facility]: Phone: [phone], Fax:[phone]  [facility]: Phone: [phone], Fax: [phone]

## 2020-01-16 NOTE — NURSING NOTE
Chief Complaint   Patient presents with     Derm Problem     Patient is here today for a follow up.      Kimberly Mathews CMA

## 2020-01-16 NOTE — PATIENT INSTRUCTIONS
1. Start lidex 0.05% solution once or twice weekly at nighttime before bed as needed for scalp pruritus.  2. Start over-the-counter dandruff shampoo; recommend tar-based (neutrogena t-gel) or zinc-based (head and shoulders). Perform 2-3 times weekly; let sit for 2-3 minutes before rinsing.   3. Follow-up in 6 weeks for possibly repeat injections.

## 2020-01-17 NOTE — NURSING NOTE
0.9 Drug Administration Record    Prior to injection, verified patient identity using patient's name and date of birth.  Due to injection administration, patient instructed to remain in clinic for 15 minutes  afterwards, and to report any adverse reaction to me immediately.    Drug Name: triamcinolone acetonide(kenalog)  Dose 0.9 of triamcinolone 5mg/mL, 4.5 mg dose  Route administered: ID  NDC #: [id number]: Kenalog-10 (9692-4225-92)  Amount of waste(mL)4.1  Reason for waste: Multi dose vial    LOT #: [id number]  SITE: May 2021  : [employer]  EXPIRATION DATE: May 2021

## 2020-01-23 ENCOUNTER — MYC MEDICAL ADVICE (OUTPATIENT)
Dept: INTERNAL MEDICINE | Facility: CLINIC | Age: 45
End: 2020-01-23

## 2020-01-23 DIAGNOSIS — F98.8 ATTENTION DEFICIT DISORDER (ADD) WITHOUT HYPERACTIVITY: Primary | ICD-10-CM

## 2020-01-24 RX ORDER — LISDEXAMFETAMINE DIMESYLATE 30 MG/1
30 CAPSULE ORAL EVERY MORNING
Qty: 30 CAPSULE | Refills: 0 | Status: SHIPPED | OUTPATIENT
Start: 2020-01-25 | End: 2020-03-02

## 2020-01-24 NOTE — TELEPHONE ENCOUNTER
"Mychart from Pt-  Please send a \"refill\" of 30mg Vyvanse to the [facility] in [facility] [address] in [place].       The 20mg dose just isn't quite enough.   MD requesting a check on . Route to BEAR [facility] Paramedic on 1/24/2020 at 3:33 PM     "

## 2020-02-06 ENCOUNTER — TELEPHONE (OUTPATIENT)
Dept: SLEEP MEDICINE | Facility: CLINIC | Age: 45
End: 2020-02-06

## 2020-02-06 NOTE — TELEPHONE ENCOUNTER
Message left for patient to call the scheduling line as she is over due for a follow up.      Also mailed out a reminder card for patient to call and schedule follow up.  Marcela Medina [facility]

## 2020-02-20 ENCOUNTER — OFFICE VISIT (OUTPATIENT)
Dept: DERMATOLOGY | Facility: CLINIC | Age: 45
End: 2020-02-20
Payer: COMMERCIAL

## 2020-02-20 DIAGNOSIS — L63.9 ALOPECIA AREATA: Primary | ICD-10-CM

## 2020-02-20 ASSESSMENT — PAIN SCALES - GENERAL: PAINLEVEL: NO PAIN (0)

## 2020-02-20 NOTE — LETTER
2/20/2020       RE: Kim Solomon  [address] [place] [address]     Dear Colleague,    Thank you for referring your patient, Kim Solomon, to the [facility] DERMATOLOGY at [facility]. Please see a copy of my visit note below.    [facility] Dermatology Note    Dermatology Problem List:  1. Alopecia areata - s/p ILK 12/11/2019, 1/16/2020, 2/20/2020  - lidex 0.05% solution BIW for scalp  - OTC dandruff shampoo    Encounter Date: Feb 20, 2020    CC:  Chief Complaint   Patient presents with     Hair Loss     Patient is here today for a hair loss follow up.       History of Present Illness:  Ms. Kim Solomon is a 44 year old female who presents as a follow up for alopecia areata. Last seen one month ago for ILK to the scalp, continued on allegra 180 mg PO qdaily and started on lidex 0.05% solution and OTC zinc or tar-based shampoo.     Today, the patient reports that she is seeing hair growth to the frontal scalp and on the other areas of her scalp. She has not been taking allegra due to her allergies and is back on zyrtec which she tolerates better. The patient has been using Neutregena T-gel shampoo regularly and inconsistent with lidex 0.05% solution. Health otherwise stable. No other skin concerns.     Past Medical History:   Patient Active Problem List   Diagnosis     Allergic rhinitis     Iron deficiency anemia     Contact dermatitis and other eczema, due to unspecified cause     Undifferentiated inflammatory arthritis (H)     S/P laparoscopic cholecystectomy     Abdominal pain, unspecified abdominal location     SAADIA (obstructive sleep apnea)     Past Medical History:   Diagnosis Date     ADD (attention deficit disorder)     on vyvanse, [facility]     Asthma      Depression      GERD (gastroesophageal reflux disease)      Impaired fasting blood sugar      Iron deficiency     heavy menses     Obesity       SAADIA (obstructive sleep apnea) 10/15    mild, AHI  5.5, has mandibular adv device     Past Surgical History:   Procedure Laterality Date     ANTROSTOMY NASAL  2011    Procedure:ANTROSTOMY NASAL; Bilateral Maxillary  Antrostomy; Surgeon:JOSELUIS MISHRA; Location:[facility] OR     ENDOSCOPIC SINUS SURGERY  2011    Procedure:ENDOSCOPIC SINUS SURGERY; Bilateral Endoscopic Sinus Surgery Anterior Ethmoidectomy; Surgeon:JOSELUIS MISHRA; Location[facility]     EYE SURGERY       LAPAROSCOPIC CHOLECYSTECTOMY WITH CHOLANGIOGRAMS  9/10/2012    Procedure: LAPAROSCOPIC CHOLECYSTECTOMY WITH CHOLANGIOGRAMS;  Laparoscopic Cholecystectomy,  ;  Surgeon: Juancho Jim MD;  Location: [facility] OR       Social History:  Patient reports that she has never smoked. She has never used smokeless tobacco. She reports that she does not drink alcohol or use drugs.    Family History:  Family History   Problem Relation Age of Onset     Heart Failure Maternal Grandmother      Breast Cancer Maternal Grandmother      Rheumatoid Arthritis Mother      Hypertension Mother      C.A.D. Mother 69     Heart Failure Mother         on O2 in Assisted Living     Endometrial Cancer Mother         dx 3/18     Parkinsonism Father                 Medications:  Current Outpatient Medications   Medication Sig Dispense Refill     albuterol 90 MCG/ACT inhaler Inhale 2 puffs into the lungs every 6 hours as needed.       beclomethasone (QVAR) 80 MCG/ACT Inhaler Inhale 2 puffs into the lungs 2 times daily 1 Inhaler 11     cetirizine (ZYRTEC) 10 MG tablet Take 10 mg by mouth daily.       EPINEPHrine (EPIPEN JR) 0.15 MG/0.3ML injection Inject 0.15 mg into the muscle as needed.         fluocinonide (LIDEX) 0.05 % external solution Apply 10-15 drops once or twice weekly at nighttime before bed to entire scalp as needed for pruritus 60 mL 3     fluticasone (VERAMYST) 27.5 MCG/SPRAY spray Spray 2 sprays into both nostrils daily       lisdexamfetamine (VYVANSE) 20 MG capsule Take  1 capsule (20 mg) by mouth daily Okay to fill 12/24 for 12/25 start 30 capsule 0     lisdexamfetamine (VYVANSE) 30 MG capsule Take 1 capsule (30 mg) by mouth every morning 30 capsule 0     montelukast (SINGULAIR) 10 MG tablet TAKE 1 TABLET BY MOUTH EVERY DAY IN THE MORNING.  3     omeprazole (PRILOSEC) 40 MG DR capsule Take 1 capsule (40 mg) by mouth daily 90 capsule 2     sertraline (ZOLOFT) 100 MG tablet Take 1.5 tablets (150 mg) by mouth daily Hold until needed 135 tablet 3       Allergies   Allergen Reactions     Diphtheria Toxoid Hives     Medrol [Methylprednisolone Sodium Succinate] Hives     Per allergy testing. Allergic to all oral steroids. OK with topical and inhaled.     No Clinical Screening - See Comments Hives     Orange Fruit      Peanuts [Nuts]      All legumes and related oils     Penicillins Hives     Prednisone Hives       Review of Systems:  -Constitutional: Patient is otherwise feeling well, in usual state of health.   -Skin: As above in HPI. No additional skin concerns.    Physical exam:  Vitals: There were no vitals taken for this visit.  GEN: This is a well developed, well-nourished female in no acute distress, in a pleasant mood.    SKIN: Focused examination of the scalp was performed.  - Mirza Type II  - 1.0 -1.5 cm alopecia patch with  mix of lighter and more darkly pigmented terminal hair centrally on right medial frontal scalp with central regrowth as compared to last visit. Still with positive pull test peripherally.   - Mild pink erythema and scale on the frontotemporal scalp.   - No eyebrow or eyelash changes.  - No nail changes.   - No other lesions of concern on areas examined.     Impression/Plan:    1. Alopecia areata, improvement with ILK. Will continue ILK injections with addition of home topical steroid as below for field treatment and pruritus.       ILK today    Continue lidex 0.05% solution BIW, apply 10-15 drops before bed time    Continue Neutrogena T-gel shampoo  TIW    PROCEDURE NOTE  - Kenalog intralesional injection procedure note: After verbal consent and discussion of risks including but not limited to atrophy, pain, and bruising, time out was performed, the patient underwent positioning and the area was prepped with isopropyl alcohol, 0. 8 total cc of Kenalog 5 mg/cc was injected into 1 alopecia patch on the right frontal scalp. The patient tolerated the procedure well and left the Dermatology clinic in good condition.    Follow-up in 6 weeks, earlier for new or changing lesions.     Staff Involved:  Scribe/Staff    Scribe Disclosure  I, Bere Pham, am serving as a scribe to document services personally performed by Dr. Parish Easley MD, based on data collection and the provider's statements to me.     Provider Disclosure:   The documentation recorded by the scribe accurately reflects the services I personally performed and the decisions made by me.    Parish Easley MD    Department of Dermatology  [facility]: Phone: [phone], Fax:[phone]  [facility]: Phone: [phone] Fax: [phone]

## 2020-02-20 NOTE — PROGRESS NOTES
[facility] Dermatology Note    Dermatology Problem List:  1. Alopecia areata - s/p ILK 12/11/2019, 1/16/2020, 2/20/2020  - lidex 0.05% solution BIW for scalp  - OTC dandruff shampoo    Encounter Date: Feb 20, 2020    CC:  Chief Complaint   Patient presents with     Hair Loss     Patient is here today for a hair loss follow up.       History of Present Illness:  Ms. Kim Solomon is a 44 year old female who presents as a follow up for alopecia areata. Last seen one month ago for ILK to the scalp, continued on allegra 180 mg PO qdaily and started on lidex 0.05% solution and OTC zinc or tar-based shampoo.     Today, the patient reports that she is seeing hair growth to the frontal scalp and on the other areas of her scalp. She has not been taking allegra due to her allergies and is back on zyrtec which she tolerates better. The patient has been using Neutregena T-gel shampoo regularly and inconsistent with lidex 0.05% solution. Health otherwise stable. No other skin concerns.     Past Medical History:   Patient Active Problem List   Diagnosis     Allergic rhinitis     Iron deficiency anemia     Contact dermatitis and other eczema, due to unspecified cause     Undifferentiated inflammatory arthritis (H)     S/P laparoscopic cholecystectomy     Abdominal pain, unspecified abdominal location     SAADIA (obstructive sleep apnea)     Past Medical History:   Diagnosis Date     ADD (attention deficit disorder)     on vyvanse, [facility] by [facility]     Asthma      Depression      GERD (gastroesophageal reflux disease)      Impaired fasting blood sugar      Iron deficiency     heavy menses     Obesity      SAADIA (obstructive sleep apnea) 10/15    mild, AHI  5.5, has mandibular adv device     Past Surgical History:   Procedure Laterality Date     ANTROSTOMY NASAL  6/21/2011    Procedure:ANTROSTOMY NASAL; Bilateral Maxillary  Antrostomy; Surgeon:JOSELUIS MISHRA; Location: OR      ENDOSCOPIC SINUS SURGERY  2011    Procedure:ENDOSCOPIC SINUS SURGERY; Bilateral Endoscopic Sinus Surgery Anterior Ethmoidectomy; Surgeon:JOSELUIS MISHRA; Location:[facility] OR     EYE SURGERY       LAPAROSCOPIC CHOLECYSTECTOMY WITH CHOLANGIOGRAMS  9/10/2012    Procedure: LAPAROSCOPIC CHOLECYSTECTOMY WITH CHOLANGIOGRAMS;  Laparoscopic Cholecystectomy,  ;  Surgeon: Juancho Jim MD;  Location: [facility] OR       Social History:  Patient reports that she has never smoked. She has never used smokeless tobacco. She reports that she does not drink alcohol or use drugs.    Family History:  Family History   Problem Relation Age of Onset     Heart Failure Maternal Grandmother      Breast Cancer Maternal Grandmother      Rheumatoid Arthritis Mother      Hypertension Mother      C.A.D. Mother 69     Heart Failure Mother         on O2 in Assisted Living     Endometrial Cancer Mother         dx 3/18     Parkinsonism Father                 Medications:  Current Outpatient Medications   Medication Sig Dispense Refill     albuterol 90 MCG/ACT inhaler Inhale 2 puffs into the lungs every 6 hours as needed.       beclomethasone (QVAR) 80 MCG/ACT Inhaler Inhale 2 puffs into the lungs 2 times daily 1 Inhaler 11     cetirizine (ZYRTEC) 10 MG tablet Take 10 mg by mouth daily.       EPINEPHrine (EPIPEN JR) 0.15 MG/0.3ML injection Inject 0.15 mg into the muscle as needed.         fluocinonide (LIDEX) 0.05 % external solution Apply 10-15 drops once or twice weekly at nighttime before bed to entire scalp as needed for pruritus 60 mL 3     fluticasone (VERAMYST) 27.5 MCG/SPRAY spray Spray 2 sprays into both nostrils daily       lisdexamfetamine (VYVANSE) 20 MG capsule Take 1 capsule (20 mg) by mouth daily Okay to fill  for  start 30 capsule 0     lisdexamfetamine (VYVANSE) 30 MG capsule Take 1 capsule (30 mg) by mouth every morning 30 capsule 0     montelukast (SINGULAIR) 10 MG tablet TAKE 1 TABLET BY MOUTH EVERY DAY IN THE  MORNING.  3     omeprazole (PRILOSEC) 40 MG DR capsule Take 1 capsule (40 mg) by mouth daily 90 capsule 2     sertraline (ZOLOFT) 100 MG tablet Take 1.5 tablets (150 mg) by mouth daily Hold until needed 135 tablet 3       Allergies   Allergen Reactions     Diphtheria Toxoid Hives     Medrol [Methylprednisolone Sodium Succinate] Hives     Per allergy testing. Allergic to all oral steroids. OK with topical and inhaled.     No Clinical Screening - See Comments Hives     Orange Fruit      Peanuts [Nuts]      All legumes and related oils     Penicillins Hives     Prednisone Hives       Review of Systems:  -Constitutional: Patient is otherwise feeling well, in usual state of health.   -Skin: As above in HPI. No additional skin concerns.    Physical exam:  Vitals: There were no vitals taken for this visit.  GEN: This is a well developed, well-nourished female in no acute distress, in a pleasant mood.    SKIN: Focused examination of the scalp was performed.  - Mirza Type II  - 1.0 -1.5 cm alopecia patch with mix of lighter and more darkly pigmented terminal hair centrally on right medial frontal scalp with central regrowth as compared to last visit. Still with positive pull test peripherally.   - Mild pink erythema and scale on the frontotemporal scalp.   - No eyebrow or eyelash changes.  - No nail changes.   - No other lesions of concern on areas examined.     Impression/Plan:    1. Alopecia areata, improvement with ILK. Will continue ILK injections with addition of home topical steroid as below for field treatment and pruritus.       ILK today    Continue lidex 0.05% solution BIW, apply 10-15 drops before bed time    Continue Neutrogena T-gel shampoo TIW    PROCEDURE NOTE  - Kenalog intralesional injection procedure note: After verbal consent and discussion of risks including but not limited to atrophy, pain, and bruising, time out was performed, the patient underwent positioning and the area was prepped with  isopropyl alcohol, 0.8 total cc of Kenalog 5 mg/cc was injected into 1 alopecia patch on the right frontal scalp. The patient tolerated the procedure well and left the Dermatology clinic in good condition.    Follow-up in 6 weeks, earlier for new or changing lesions.     Staff Involved:  Scribe/Staff    Scribe Disclosure  I, Bere Pham, am serving as a scribe to document services personally performed by Dr. Parish Easley MD, based on data collection and the provider's statements to me.     Provider Disclosure:   The documentation recorded by the scribe accurately reflects the services I personally performed and the decisions made by me.    Parish Easley MD    Department of Dermatology  [facility]: Phone: [phone], Fax:[phone]  [facility]: Phone: [phone] Fax: [phone]

## 2020-02-20 NOTE — NURSING NOTE
Drug Administration Record    Prior to injection, verified patient identity using patient's name and date of birth.  Due to injection administration, patient instructed to remain in clinic for 15 minutes  afterwards, and to report any adverse reaction to me immediately.    Drug Name: triamcinolone acetonide(kenalog)  Dose: 0.8mL of triamcinolone 5mg/mL, 4mg dose  Route administered: ID  NDC #: [id number]: Kenalog-10 (4342-3027-24)  Amount of waste(mL):4.6  Reason for waste: Single use vial    LOT #: [id number]  SITE: [facility]  : [employer]  EXPIRATION DATE: 06/2021    Kimberly Mathews CMA

## 2020-02-20 NOTE — NURSING NOTE
Chief Complaint   Patient presents with     Hair Loss     Patient is here today for a hair loss follow up.     Kimberly Mathews CMA

## 2020-07-10 ENCOUNTER — MYC MEDICAL ADVICE (OUTPATIENT)
Dept: INTERNAL MEDICINE | Facility: CLINIC | Age: 45
End: 2020-07-10

## 2020-07-10 DIAGNOSIS — F98.8 ATTENTION DEFICIT DISORDER (ADD) WITHOUT HYPERACTIVITY: ICD-10-CM

## 2020-07-13 NOTE — TELEPHONE ENCOUNTER
Patient Requested  lisdexamfetamine (VYVANSE) 30 MG capsule  Last Filled  04/03/2020  Last Office Visit  11/25/2019  Next Office Visit  Nothing Scheduled   Checked  07/13/2020  Attention deficit disorder (ADD) without hyperactivity   DX:     Pharmacy: [facility] [id number] [place] [address]    KRISTA YOUSIF CMA at 12:19 PM on 7/13/2020.

## 2020-07-14 RX ORDER — LISDEXAMFETAMINE DIMESYLATE 30 MG/1
30 CAPSULE ORAL EVERY MORNING
Qty: 90 CAPSULE | Refills: 0 | Status: SHIPPED | OUTPATIENT
Start: 2020-07-14 | End: 2020-10-13

## 2020-12-16 ENCOUNTER — MYC REFILL (OUTPATIENT)
Dept: INTERNAL MEDICINE | Facility: CLINIC | Age: 45
End: 2020-12-16

## 2020-12-16 DIAGNOSIS — F98.8 ATTENTION DEFICIT DISORDER (ADD) WITHOUT HYPERACTIVITY: ICD-10-CM

## 2020-12-16 RX ORDER — SERTRALINE HYDROCHLORIDE 100 MG/1
150 TABLET, FILM COATED ORAL DAILY
Qty: 135 TABLET | Refills: 3 | Status: CANCELLED | OUTPATIENT
Start: 2020-12-16

## 2020-12-17 DIAGNOSIS — F98.8 ATTENTION DEFICIT DISORDER (ADD) WITHOUT HYPERACTIVITY: ICD-10-CM

## 2020-12-17 RX ORDER — SERTRALINE HYDROCHLORIDE 100 MG/1
200 TABLET, FILM COATED ORAL DAILY
Qty: 180 TABLET | Refills: 0 | Status: SHIPPED | OUTPATIENT
Start: 2020-12-17 | End: 2021-02-15

## 2020-12-22 RX ORDER — SERTRALINE HYDROCHLORIDE 100 MG/1
TABLET, FILM COATED ORAL
Qty: 135 TABLET | Refills: 3 | OUTPATIENT
Start: 2020-12-22

## 2021-01-15 ENCOUNTER — HEALTH MAINTENANCE LETTER (OUTPATIENT)
Age: 46
End: 2021-01-15

## 2021-02-15 ENCOUNTER — VIRTUAL VISIT (OUTPATIENT)
Dept: INTERNAL MEDICINE | Facility: CLINIC | Age: 46
End: 2021-02-15
Payer: COMMERCIAL

## 2021-02-15 DIAGNOSIS — F33.1 MODERATE EPISODE OF RECURRENT MAJOR DEPRESSIVE DISORDER (H): Primary | ICD-10-CM

## 2021-02-15 DIAGNOSIS — F98.8 ATTENTION DEFICIT DISORDER (ADD) WITHOUT HYPERACTIVITY: ICD-10-CM

## 2021-02-15 PROCEDURE — 99214 OFFICE O/P EST MOD 30 MIN: CPT | Mod: 95 | Performed by: INTERNAL MEDICINE

## 2021-02-15 RX ORDER — SERTRALINE HYDROCHLORIDE 100 MG/1
200 TABLET, FILM COATED ORAL DAILY
Qty: 180 TABLET | Refills: 3 | Status: SHIPPED | OUTPATIENT
Start: 2021-02-15 | End: 2022-03-08

## 2021-02-15 RX ORDER — LISDEXAMFETAMINE DIMESYLATE 30 MG/1
30 CAPSULE ORAL EVERY MORNING
Qty: 90 CAPSULE | Refills: 0 | Status: SHIPPED | OUTPATIENT
Start: 2021-02-21 | End: 2021-06-11

## 2021-02-15 NOTE — NURSING NOTE
Chief Complaint   Patient presents with     Medication Follow-up     Video Visit Technology for this patient: Marty Video Visit- Patient was left in waiting room    Edilia Paz LPN at 3:10 PM on 2/15/2021.

## 2021-02-15 NOTE — PROGRESS NOTES
Kim Solomon is a 45 year old female who is being evaluated via a billable video visit.      The patient has consented to a video visit and informed that video and telephone visits are being performed during the COVID-19 pandemic in order to mitigate the risk of an in office visit for appropriate candidates/issues. If during the course of the call the physician/provider feels a video visit is not appropriate, you will not be charged for this service. If the provider feels that they are unable to assess your concerns without an in person visit, you will be advised of this limitation and depending on the nature of the concern, advised to seek in person care if your provider feels you need urgent evaluation.    History of Present Illness:  Ms. Solomon is a 45 year old female who presents for  Chief Complaint   Patient presents with     Medication Follow-up       Back in school in person 4/5 days per week because smaller school  Got covid vaccine 1/6 first dose, feeling optimistic about that    Depression has been an issue during the pandemic, she requested increase in sertraline dose, went from 150 to 200 mg dose and feels it's helping modestly  Still giong to gym, wakling dogs     had Covid November, they  and she did not get symptoms, she tested negative  She did have a sinus infection in December, she took doxycycline and it resolved  Asthma has been controlled    She saw derm for alopecia areata, states hair grew back    Missed some periods for 4 months, pregnancy neg, periods are back and heavy 2 days then normal, taking iron supplements    Lab Results   Component Value Date    PAP NIL 08/01/2019         Review of external notes as documented above                   Patient Active Problem List   Diagnosis     Allergic rhinitis     Iron deficiency anemia     Contact dermatitis and other eczema, due to unspecified cause     Undifferentiated inflammatory arthritis (H)     S/P laparoscopic  cholecystectomy     Abdominal pain, unspecified abdominal location     SAADIA (obstructive sleep apnea)     Past Surgical History:   Procedure Laterality Date     ANTROSTOMY NASAL  2011    Procedure:ANTROSTOMY NASAL; Bilateral Maxillary  Antrostomy; Surgeon:JOSELUIS MISHRA; Location:[facility] OR     ENDOSCOPIC SINUS SURGERY  2011    Procedure:ENDOSCOPIC SINUS SURGERY; Bilateral Endoscopic Sinus Surgery Anterior Ethmoidectomy; Surgeon:JOSELUIS MISHRA; Location:[facility]     EYE SURGERY       LAPAROSCOPIC CHOLECYSTECTOMY WITH CHOLANGIOGRAMS  9/10/2012    Procedure: LAPAROSCOPIC CHOLECYSTECTOMY WITH CHOLANGIOGRAMS;  Laparoscopic Cholecystectomy,  ;  Surgeon: Juancho Jim MD;  Location: [facility] OR       Social History     Tobacco Use     Smoking status: Never Smoker     Smokeless tobacco: Never Used   Substance Use Topics     Alcohol use: No     Comment: occasionally     Family History   Problem Relation Age of Onset     Heart Failure Maternal Grandmother      Breast Cancer Maternal Grandmother      Rheumatoid Arthritis Mother      Hypertension Mother      C.A.D. Mother 69     Heart Failure Mother         on O2 in Assisted Living     Endometrial Cancer Mother         dx 3/18     Parkinsonism Father                   Current Outpatient Medications   Medication Sig Dispense Refill     albuterol 90 MCG/ACT inhaler Inhale 2 puffs into the lungs every 6 hours as needed.       cetirizine (ZYRTEC) 10 MG tablet Take 10 mg by mouth daily.       EPINEPHrine (EPIPEN JR) 0.15 MG/0.3ML injection Inject 0.15 mg into the muscle as needed.         fluocinonide (LIDEX) 0.05 % external solution Apply 10-15 drops once or twice weekly at nighttime before bed to entire scalp as needed for pruritus 60 mL 3     fluticasone (VERAMYST) 27.5 MCG/SPRAY spray Spray 2 sprays into both nostrils daily       [START ON 2021] lisdexamfetamine (VYVANSE) 30 MG capsule Take 1 capsule (30 mg) by mouth every morning 90 capsule 0     montelukast  "(SINGULAIR) 10 MG tablet TAKE 1 TABLET BY MOUTH EVERY DAY IN THE MORNING.  3     omeprazole (PRILOSEC) 40 MG DR capsule Take 1 capsule (40 mg) by mouth daily 90 capsule 3     sertraline (ZOLOFT) 100 MG tablet Take 2 tablets (200 mg) by mouth daily 180 tablet 3     beclomethasone (QVAR) 80 MCG/ACT Inhaler Inhale 2 puffs into the lungs 2 times daily 1 Inhaler 11     Allergies   Allergen Reactions     Diphtheria Toxoid Hives     Medrol [Methylprednisolone Sodium Succinate] Hives     Per allergy testing. Allergic to all oral steroids. OK with topical and inhaled.     No Clinical Screening - See Comments Hives     Orange Fruit      Peanuts [Nuts]      All legumes and related oils     Penicillins Hives     Prednisone Hives         Review of Systems   A detailed ROS was performed and was negative unless indicated in the HPI above.        Physical Exam   There were no vitals taken for this visit.  Wt Readings from Last 2 Encounters:   08/21/19 110.8 kg (244 lb 3.2 oz)   08/01/19 111.4 kg (245 lb 8 oz)      Ht Readings from Last 2 Encounters:   08/01/19 1.676 m (5' 6\")   06/13/19 1.676 m (5' 6\")     GENERAL: healthy, alert and no distress  HEAD: Normocephalic, atraumatic  EYES: Eyes grossly normal to inspection, EOMI and conjunctivae and sclerae normal  RESP: Speaking in full sentences, unlabored, no audible wheezes or cough  SKIN: no suspicious lesions or rashes, no jaundice  NEURO: oriented, and speech normal  PSYCH: mentation appears normal, affect normal/blunted, rate of speech slower          Diagnostic Test Results:  Labs reviewed in Epic            Assessment and Plan:  Kim was seen today for medication follow-up.    Diagnoses and all orders for this visit:    Moderate episode of recurrent major depressive disorder (H)  Currently managing okay with her mental health. Will continue higher dose.  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  -     lisdexamfetamine (VYVANSE) 30 MG capsule; Take 1 capsule (30 mg) by mouth every morning          Video-Visit Details    Type of service:  Video Visit    Video Start/End Time: 4:49 PM 5:05 PM    Originating Location (pt. Location): Home    Distant Location (provider location):  [facility]     Mode of Communication:  Video Conference via  [employer] Cloud or  [employer]        Cookie Donnelly MD  Internal Medicine      >30 minutes spent today performing chart review, history and exam, documentation and further activities as noted above.

## 2021-04-16 ENCOUNTER — MYC MEDICAL ADVICE (OUTPATIENT)
Dept: INTERNAL MEDICINE | Facility: CLINIC | Age: 46
End: 2021-04-16

## 2021-04-16 NOTE — LETTER
Kim Solomon  [address] [place] [address]  : 1975  MRN:  [id number]      2021          To Whom It May Concern:      Kim Solomon is under my care for management of her ADD inattentive type. 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       Sincerely,  Cookie Donnelly MD  Internal Medicine

## 2021-04-20 NOTE — TELEPHONE ENCOUNTER
"Message sent to patient in [email or web address].  Patient should have access to this letter in her [employer] account under \"Letters\".      Porfirio Hyman CMA ([facility]) at 10:52 AM on 4/20/2021     Letter mailed to pt home address.  Jordana Birch RN 1:50 PM on 4/20/2021.      "

## 2021-06-07 ENCOUNTER — MYC MEDICAL ADVICE (OUTPATIENT)
Dept: INTERNAL MEDICINE | Facility: CLINIC | Age: 46
End: 2021-06-07

## 2021-06-07 DIAGNOSIS — F98.8 ATTENTION DEFICIT DISORDER (ADD) WITHOUT HYPERACTIVITY: ICD-10-CM

## 2021-06-10 NOTE — TELEPHONE ENCOUNTER
Patient Requested  lisdexamfetamine (VYVANSE) 30 MG capsule  Last Filled  02/21/2021-90 days  Last Office Visit  02/15/2021  Next Office Visit  Nothing Scheduled   Checked  06/10/2021  Attention deficit disorder (ADD) without hyperactivity   DX:     Pharmacy: [facility] [id number] [place] [address]    KRISTA YOUSIF CMA at 7:20 AM on 6/10/2021.

## 2021-06-11 RX ORDER — LISDEXAMFETAMINE DIMESYLATE 30 MG/1
30 CAPSULE ORAL EVERY MORNING
Qty: 90 CAPSULE | Refills: 0 | Status: SHIPPED | OUTPATIENT
Start: 2021-06-11 | End: 2021-09-16

## 2021-09-04 ENCOUNTER — HEALTH MAINTENANCE LETTER (OUTPATIENT)
Age: 46
End: 2021-09-04

## 2021-10-24 ENCOUNTER — MYC MEDICAL ADVICE (OUTPATIENT)
Dept: INTERNAL MEDICINE | Facility: CLINIC | Age: 46
End: 2021-10-24

## 2021-10-24 DIAGNOSIS — F98.8 ATTENTION DEFICIT DISORDER (ADD) WITHOUT HYPERACTIVITY: ICD-10-CM

## 2021-10-27 RX ORDER — LISDEXAMFETAMINE DIMESYLATE 30 MG/1
30 CAPSULE ORAL EVERY MORNING
Qty: 90 CAPSULE | Refills: 0 | Status: SHIPPED | OUTPATIENT
Start: 2021-10-27 | End: 2021-11-26

## 2021-10-27 NOTE — TELEPHONE ENCOUNTER
Patient Requested  lisdexamfetamine (VYVANSE) 30 MG capsule  Last ordered  09/17/2021  Last Office Visit  02/15/2021  Next Office Visit  Nothing Scheduled   Checked  10/27/2021  Attention deficit disorder (ADD) without hyperactivity   DX:     Pharmacy: [facility] [id number] [place] [address]    KRISTA Castle RN at 7:12 AM on 10/27/2021.

## 2021-11-26 ENCOUNTER — MYC REFILL (OUTPATIENT)
Dept: INTERNAL MEDICINE | Facility: CLINIC | Age: 46
End: 2021-11-26
Payer: COMMERCIAL

## 2021-11-26 DIAGNOSIS — F98.8 ATTENTION DEFICIT DISORDER (ADD) WITHOUT HYPERACTIVITY: ICD-10-CM

## 2021-11-29 NOTE — TELEPHONE ENCOUNTER
Controlled Substance Refill Request for lisdexamfetamine (VYVANSE) 30 MG capsule    Last refill: 10/27/21    Last virtual visit: 2/15/21     Next appt: none scheduled     checked:  11/29/21        Bere Berumen RN on 11/29/2021 at 9:17 AM

## 2021-12-01 RX ORDER — LISDEXAMFETAMINE DIMESYLATE 30 MG/1
30 CAPSULE ORAL EVERY MORNING
Qty: 30 CAPSULE | Refills: 0 | Status: SHIPPED | OUTPATIENT
Start: 2021-12-01 | End: 2022-01-02

## 2022-01-02 ENCOUNTER — MYC REFILL (OUTPATIENT)
Dept: INTERNAL MEDICINE | Facility: CLINIC | Age: 47
End: 2022-01-02
Payer: COMMERCIAL

## 2022-01-02 DIAGNOSIS — F98.8 ATTENTION DEFICIT DISORDER (ADD) WITHOUT HYPERACTIVITY: ICD-10-CM

## 2022-01-04 RX ORDER — LISDEXAMFETAMINE DIMESYLATE 30 MG/1
30 CAPSULE ORAL EVERY MORNING
Qty: 30 CAPSULE | Refills: 0 | Status: SHIPPED | OUTPATIENT
Start: 2022-01-04 | End: 2022-02-05

## 2022-02-05 ENCOUNTER — MYC REFILL (OUTPATIENT)
Dept: INTERNAL MEDICINE | Facility: CLINIC | Age: 47
End: 2022-02-05
Payer: COMMERCIAL

## 2022-02-05 DIAGNOSIS — F98.8 ATTENTION DEFICIT DISORDER (ADD) WITHOUT HYPERACTIVITY: ICD-10-CM

## 2022-02-08 RX ORDER — LISDEXAMFETAMINE DIMESYLATE 30 MG/1
30 CAPSULE ORAL EVERY MORNING
Qty: 90 CAPSULE | Refills: 0 | Status: SHIPPED | OUTPATIENT
Start: 2022-02-08 | End: 2022-03-09

## 2022-02-19 ENCOUNTER — HEALTH MAINTENANCE LETTER (OUTPATIENT)
Age: 47
End: 2022-02-19

## 2022-03-05 DIAGNOSIS — F98.8 ATTENTION DEFICIT DISORDER (ADD) WITHOUT HYPERACTIVITY: ICD-10-CM

## 2022-03-08 RX ORDER — SERTRALINE HYDROCHLORIDE 100 MG/1
200 TABLET, FILM COATED ORAL DAILY
Qty: 180 TABLET | Refills: 0 | Status: SHIPPED | OUTPATIENT
Start: 2022-03-08 | End: 2022-06-07

## 2022-03-08 NOTE — TELEPHONE ENCOUNTER
Last Clinic Visit: 2/15/2021  [facility] [place]  No upcoming appointments.  90 day refill provided per protocol, routed to clinic scheduling for follow up

## 2022-03-09 ENCOUNTER — MYC REFILL (OUTPATIENT)
Dept: INTERNAL MEDICINE | Facility: CLINIC | Age: 47
End: 2022-03-09
Payer: COMMERCIAL

## 2022-03-09 DIAGNOSIS — F98.8 ATTENTION DEFICIT DISORDER (ADD) WITHOUT HYPERACTIVITY: ICD-10-CM

## 2022-03-11 RX ORDER — LISDEXAMFETAMINE DIMESYLATE 30 MG/1
30 CAPSULE ORAL EVERY MORNING
Qty: 30 CAPSULE | Refills: 0 | Status: SHIPPED | OUTPATIENT
Start: 2022-03-11 | End: 2022-04-12

## 2022-03-23 DIAGNOSIS — Z00.00 ROUTINE GENERAL MEDICAL EXAMINATION AT A HEALTH CARE FACILITY: ICD-10-CM

## 2022-03-25 RX ORDER — OMEPRAZOLE 40 MG/1
40 CAPSULE, DELAYED RELEASE ORAL DAILY
Qty: 90 CAPSULE | Refills: 0 | Status: SHIPPED | OUTPATIENT
Start: 2022-03-25 | End: 2022-06-29

## 2022-03-25 NOTE — TELEPHONE ENCOUNTER
OMEPRAZOLE DR 40 MG CAPSULE  Last Written Prescription Date:   4/1/2021  Last Fill Quantity: 90,   # refills: 3  Last Office Visit :  2/15/2021  Future Office visit:  None  90 days sent to pharm  Office visit due before next refill  Notified clinic      Misti Lockhart RN  Central Triage Red Flags/Med Refills

## 2022-04-12 ENCOUNTER — MYC REFILL (OUTPATIENT)
Dept: INTERNAL MEDICINE | Facility: CLINIC | Age: 47
End: 2022-04-12
Payer: COMMERCIAL

## 2022-04-12 DIAGNOSIS — F98.8 ATTENTION DEFICIT DISORDER (ADD) WITHOUT HYPERACTIVITY: ICD-10-CM

## 2022-04-12 RX ORDER — LISDEXAMFETAMINE DIMESYLATE 30 MG/1
30 CAPSULE ORAL EVERY MORNING
Qty: 30 CAPSULE | Refills: 0 | Status: SHIPPED | OUTPATIENT
Start: 2022-04-12 | End: 2022-05-11

## 2022-05-11 ENCOUNTER — MYC REFILL (OUTPATIENT)
Dept: INTERNAL MEDICINE | Facility: CLINIC | Age: 47
End: 2022-05-11
Payer: COMMERCIAL

## 2022-05-11 DIAGNOSIS — F98.8 ATTENTION DEFICIT DISORDER (ADD) WITHOUT HYPERACTIVITY: ICD-10-CM

## 2022-05-12 RX ORDER — LISDEXAMFETAMINE DIMESYLATE 30 MG/1
30 CAPSULE ORAL EVERY MORNING
Qty: 30 CAPSULE | Refills: 0 | Status: SHIPPED | OUTPATIENT
Start: 2022-05-12 | End: 2022-06-16

## 2022-05-31 ENCOUNTER — MYC MEDICAL ADVICE (OUTPATIENT)
Dept: INTERNAL MEDICINE | Facility: CLINIC | Age: 47
End: 2022-05-31
Payer: COMMERCIAL

## 2022-06-01 ENCOUNTER — VIRTUAL VISIT (OUTPATIENT)
Dept: FAMILY MEDICINE | Facility: CLINIC | Age: 47
End: 2022-06-01
Payer: COMMERCIAL

## 2022-06-01 VITALS — HEIGHT: 66 IN | BODY MASS INDEX: 39.86 KG/M2 | WEIGHT: 248 LBS

## 2022-06-01 DIAGNOSIS — E66.01 MORBID OBESITY (H): ICD-10-CM

## 2022-06-01 DIAGNOSIS — U07.1 INFECTION DUE TO 2019 NOVEL CORONAVIRUS: Primary | ICD-10-CM

## 2022-06-01 PROCEDURE — 99213 OFFICE O/P EST LOW 20 MIN: CPT | Mod: 95 | Performed by: PHYSICIAN ASSISTANT

## 2022-06-01 NOTE — PROGRESS NOTES
"Kim is a 46 year old who is being evaluated via a billable video visit.      How would you like to obtain your AVS? MyChart  If the video visit is dropped, the invitation should be resent by: Text to cell phone: [phone]  Will anyone else be joining your video visit? No    Video Start Time: 11:42 AM     Assessment & Plan     Infection due to 2019 novel coronavirus  - nirmatrelvir and ritonavir (PAXLOVID) therapy pack; Take 3 tablets by mouth 2 times daily for 5 days Take 2 Nirmatrelvir tablets and 1 Ritonavir tablet twice daily for 5 days.    COVID-19 positive patient.  Encounter for consideration of medication intervention. Patient does qualify for a prescription. Full discussion with patient including medication options, risks and benefits. Potential drug interactions reviewed with patient.     Treatment Planned Paxlovid, Rx sent to [place] [facility]  [facility]   [phone]    [address] [place] [address]    Hours:  Mon-Fri: 8:30a - 5:00p  Sat-Sun: 9:00a - 1:00p    Drive-thru available   Temporary change to home medications: Do not use fluticasone nasal spray while taking Paxlovid    Estimated body mass index is 40.03 kg/m  as calculated from the following:    Height as of this encounter: 1.676 m (5' 6\").    Weight as of this encounter: 112.5 kg (248 lb).  GFR Estimate   Date Value Ref Range Status   10/03/2018 80 >60 mL/min/1.7m2 Final     Comment:     Non  GFR Calc     No results found for: [id number]    Morbid obesity (H)  Weight increases risk of worsening of chronic conditions and possible development of future problems such as diabetes, HTN, etc.  Weight loss is recommended.     Return in about 3 days (around 6/4/2022) for if no improvement, earlier if worsening.    Karen Ramírez PA-C  [facility]    Delroy Alvarez is a 46 year old who presents for the following health issues     HPI     Pharmacy is [facility]  COVID-19 " Symptom Review  Home test was on 5/31/2022  How many days ago did these symptoms start? 05/30/22    Are any of the following symptoms significant for you?  New or worsening difficulty breathing? Yes    Please describe what kind of difficulty you are having breathing:Moderate dyspnea (short of breath with ADLs, shortness of breath that limits the ability to walk up one flight of stairs without needing to rest).     Worsening cough? Yes, I am coughing up mucus.    Fever or chills? Yes, I felt feverish or had chills.101.3F 5/30/22    Headache: YES    Sore throat: YES    Chest pain: no    Diarrhea: no    Body aches? YES    What treatments has patient tried? Guaifenesin (mucinex) and Nonsteroidals   Does patient live in a nursing home, group home, or shelter? no  Does patient have a way to get food/medications during quarantined? Yes, I have a friend or family member who can help me.    Tested positive for COVID on 5/30/22  Home test negative on 5/30, positive 5/31/22    Cough - productive of mucus. Noticing some shortness of breath when walking up stairs. Otherwise no shortness of breath when walking on flat ground or at rest. No chest pain. Had fever for past 2 days, highest temp 101.3 F. No fever today. Headache, sore throat, body aches, fatigue.    She has had her COVID vaccines.  Non-smoker.  No history of kidney problems.      Review of Systems   Constitutional, HEENT, cardiovascular, pulmonary, gi and gu systems are negative, except as otherwise noted.      Objective    Vitals - Patient Reported  Pain Score: Severe Pain (6)      Vitals:  No vitals were obtained today due to virtual visit.    Physical Exam   GENERAL: Healthy, alert and no distress  EYES: Eyes grossly normal to inspection.  No discharge or erythema, or obvious scleral/conjunctival abnormalities.  RESP: No audible wheeze, cough, or visible cyanosis.  No visible retractions or increased work of breathing.    SKIN: Visible skin clear. No significant  rash, abnormal pigmentation or lesions.  NEURO: Cranial nerves grossly intact.  Mentation and speech appropriate for age.  PSYCH: Mentation appears normal, affect normal/bright, judgement and insight intact, normal speech and appearance well-groomed.      Video-Visit Details    Type of service:  Video Visit    Video End Time:11:52 AM    Originating Location (pt. Location): Home    Distant Location (provider location):  [facility]     Platform used for Video Visit: EVERFANS

## 2022-06-01 NOTE — PATIENT INSTRUCTIONS
Paxlovid  This medication should be taken as soon as possible after the diagnosis of COVID-19 within 5 days of symptom onset.    TEMPORARY CHANGES TO HOME MEDICATIONS:  Do not use fluticasone nasal spray while taking Paxlovid    What is this drug used for?  It is used in certain people to treat COVID-19. This medication is indicated for people who are at high risk for progressing to severe COVID-19 infection, including hospitalization or death.    What do I need to tell my doctor BEFORE I take this drug?  If you are allergic to this drug; any part of this drug; or any other drugs, foods, or substances. Tell your doctor about the allergy and what signs you had.  If you have any of these health problems: Kidney disease or liver disease.  If you take any drugs (prescription or OTC, natural products, vitamins) that must not be taken with this drug, like certain drugs that are used for HIV, infections, or seizures. There are many drugs that must not be taken with this drug.  This is not a list of all drugs or health problems that interact with this drug.  Tell your doctor and pharmacist about all of your drugs (prescription or OTC, natural products, vitamins) and health problems. You must check to make sure that it is safe for you to take this drug with all of your drugs and health problems. Do not start, stop, or change the dose of any drug without checking with your doctor.    What are some things I need to know or do while I take this drug?  Tell all of your health care providers that you take this drug. This includes your doctors, nurses, pharmacists, and dentists.  Do not take this drug for longer than you were told by your doctor.  This drug interacts with many other drugs. The chance of this drug's side effects may be raised or how well this drug works may be lowered. The chance of the other drugs' side effects may also be raised. This may include very bad, life-threatening, or deadly side effects. Check with your  doctor and pharmacist to make sure that it is safe for you to take this drug with all of your other drugs (prescription or OTC, natural products, vitamins).  If you have HIV infection, talk with your doctor.  If you have kidney problems, you may need to take a lower dose of this drug. Your pharmacist may remove tablets from the blister card in order to make the right dose. Be sure you know what dose of this drug to take. If you have questions about your dose or how to take it, talk with your doctor or pharmacist.  After getting this drug, you must continue to isolate and do other things to control infection. Wear a mask, social distance, do not share personal items, clean and disinfect high touch surfaces, and wash hands often as you have been told by your doctor.  Birth control pills and other hormone-based birth control may not work as well to prevent pregnancy. Use some other kind of birth control also like a condom when taking this drug.  Tell your doctor if you are pregnant, plan on getting pregnant, or are breast-feeding. You will need to talk about the benefits and risks to you and the baby.    What are some side effects that I need to call my doctor about right away?  WARNING/CAUTION: Even though it may be rare, some people may have very bad and sometimes deadly side effects when taking a drug. Tell your doctor or get medical help right away if you have any of the following signs or symptoms that may be related to a very bad side effect:  Signs of an allergic reaction, like rash; hives; itching; red, swollen, blistered, or peeling skin with or without fever; wheezing; tightness in the chest or throat; trouble breathing, swallowing, or talking; unusual hoarseness; or swelling of the mouth, face, lips, tongue, or throat.  Signs of liver problems like dark urine, feeling tired, not hungry, upset stomach or stomach pain, light-colored stools, throwing up, or yellow skin or eyes.  Signs of high blood pressure  "like very bad headache or dizziness, passing out, or change in eyesight.  Signs of a very bad skin reaction (Lawton-Frank syndrome/toxic epidermal necrolysis) like red, swollen, blistered, or peeling skin (with or without fever); red or irritated eyes; or sores in the mouth, throat, nose, or eyes.    What are some other side effects of this drug?  All drugs may cause side effects. However, many people have no side effects or only have minor side effects. Call your doctor or get medical help if any of these side effects or any other side effects bother you or do not go away:  Change in taste.  Diarrhea.  Muscle pain.  These are not all of the side effects that may occur. If you have questions about side effects, call your doctor. Call your doctor for medical advice about side effects.  You may report side effects to your national health agency.    How is this drug best taken?  Use this drug as ordered by your doctor. Read all information given to you. Follow all instructions closely.  Take with or without food.  This product comes as two drugs in separate tablets. Both drugs will need to be taken together for each dose. Be sure you know how each dose needs to be taken. Talk with the doctor if you have questions about how to take this drug.  Swallow whole. Do not chew, break, or crush.  Use as you have been told, even if your signs get better.  It is important that you do not miss or skip a dose of this drug during treatment.    What do I do if I miss a dose?  Take a missed dose as soon as you think about it.  If it has been more than 8 hours since the missed dose, skip the missed dose and go back to your normal time.  Do not take 2 doses at the same time or extra doses.  If you are not sure what to do if you miss a dose, call your doctor.    Instructions for Patients  Your COVID-19 test was positive. This means you have the virus. Please follow the \"How can I take care of myself\" and \"How do I self-isolate?\" " "guidelines in these instructions.    What treatments are available?  Over-the-counter medicines may help with your symptoms such as runny or stuffy nose, cough, chills, and fever. Talk to your care team about your options.     Some people are at high risk for severe illness (for example if you have a weak immune system, you're 65 or older, or you have certain medical problems). If your risk it high and your symptoms started in the last 5 to 7 days, we strongly recommend for you to get COVID treatment as soon as possible before you get really sick. Paxlovid, Molnupiravir and the monoclonal antibody treatments are proven safe and effective, make you feel better faster, and prevent hospitalization and death.       You can schedule an appointment to discuss COVID treatment by requesting an appointment on [email or web address] by selecting \"schedule COVID-19 Treatment\" or by calling [facility] ([phone]) and pressing 7.    What are the symptoms of COVID-19?  Symptoms can include fever, cough, shortness of breath, chills, headache, muscle pain sore throat, fatigue, runny or stuffy nose, and loss of taste and smell. Other less common symptoms include nausea, vomiting, or diarrhea (watery stools).    Know when to call 911. Emergency warning signs include:  Trouble breathing or shortness of breath  Pain or pressure in the chest that doesn't go away  Feeling confused like you haven't felt before, or not being able to wake up  Bluish-colored lips or face    How can I take care of myself?  Get lots of rest. Drink extra fluids (unless a doctor has told you not to).  Take Tylenol (acetaminophen) for fever or pain. If you have liver or kidney problems, ask your family doctor if it's okay to take Tylenol   Adults:   650 mg (two 325 mg pills or tablets) every 4 to 6 hours, or...   1,000 mg (two 500 mg pills or tablets) every 8 hours as needed.  Note: Don't take more than 3,000 mg in one day. Acetaminophen is found in many medicines (both " prescribed and over the counter). Read all labels to be sure you don't take too much.  For children, check the Tylenol bottle for the right dose. The dose is based on the child's age or weight.  Take over the counter medicines for your symptoms as needed. Talk to your pharmacist.  If you have other health problems (like cancer, heart failure, an organ transplant, or severe kidney disease): Call your specialty clinic if you don't feel better in the next 2 days.    These guidelines are for isolating and quarantining before returning to work, school or .   For employers, schools and day cares: This is an official notice for this person s medical guidelines for returning in-person.   For health care sites: The CDC gives different isolation and quarantine guidelines for healthcare sites, please check with these sites before arriving.     How do I self-isolate?  You isolate when you have symptoms of COVID or a test shows you have COVID, even if you don t have symptoms.   If you DO have symptoms:  Stay home and away from others  For at least 5 days after your symptoms started, AND   You are fever free for 24 hours (with no medicine that reduces fever), AND  Your other symptoms are better.  Wear a mask for 10 full days any time you are around others.  If you DON T have symptoms:  Stay at home and away from others for at least 5 days after your positive test.  Wear a mask for 10 full days any time you are around others.    How and when do I quarantine?  You quarantine when you may have been exposed to the virus and DON T have symptoms.   Stay home and away from others.   You must quarantine for 5 days after your last contact with a person who has COVID.  Note: If you are fully vaccinated, you don t need to quarantine. You should still follow the steps below.   Wear a mask for 10 full days any time you re around others.  Get tested at least 5 days after you were exposed, even if you don t have symptoms.   If you start  to have symptoms, isolate right away and get tested.    Where can I get more information?  [facility] COVID-19 Resource Hub: www.[facility].org/covid19/   [facility] Quarantine & Isolation: https://www.cdc.gov/coronavirus/2019-ncov/your-health/quarantine-isolation.html   [facility] - What to Do If You're Sick: https://www.cdc.gov/coronavirus/2019-ncov/if-you-are-sick/index.html  [facility] clinical trials (COVID-19 research studies): clinicalaffairs.[facility]/umn-clinical-trials  [place] Department of Health COVID-19 Public Hotline: 1-320.645.3395

## 2022-06-04 DIAGNOSIS — F98.8 ATTENTION DEFICIT DISORDER (ADD) WITHOUT HYPERACTIVITY: ICD-10-CM

## 2022-06-07 RX ORDER — SERTRALINE HYDROCHLORIDE 100 MG/1
200 TABLET, FILM COATED ORAL DAILY
Qty: 60 TABLET | Refills: 0 | Status: SHIPPED | OUTPATIENT
Start: 2022-06-07 | End: 2022-07-08

## 2022-06-16 ENCOUNTER — OFFICE VISIT (OUTPATIENT)
Dept: INTERNAL MEDICINE | Facility: CLINIC | Age: 47
End: 2022-06-16
Payer: COMMERCIAL

## 2022-06-16 VITALS
SYSTOLIC BLOOD PRESSURE: 136 MMHG | HEIGHT: 66 IN | OXYGEN SATURATION: 96 % | HEART RATE: 85 BPM | BODY MASS INDEX: 42.46 KG/M2 | WEIGHT: 264.2 LBS | DIASTOLIC BLOOD PRESSURE: 83 MMHG | RESPIRATION RATE: 16 BRPM

## 2022-06-16 DIAGNOSIS — G57.11 MERALGIA PARAESTHETICA, RIGHT: ICD-10-CM

## 2022-06-16 DIAGNOSIS — L98.9 SKIN LESION: ICD-10-CM

## 2022-06-16 DIAGNOSIS — F98.8 ATTENTION DEFICIT DISORDER (ADD) WITHOUT HYPERACTIVITY: ICD-10-CM

## 2022-06-16 DIAGNOSIS — K21.9 GASTROESOPHAGEAL REFLUX DISEASE WITHOUT ESOPHAGITIS: Primary | ICD-10-CM

## 2022-06-16 PROCEDURE — 99214 OFFICE O/P EST MOD 30 MIN: CPT | Performed by: INTERNAL MEDICINE

## 2022-06-16 RX ORDER — LISDEXAMFETAMINE DIMESYLATE 30 MG/1
30 CAPSULE ORAL EVERY MORNING
Qty: 30 CAPSULE | Refills: 0 | Status: SHIPPED | OUTPATIENT
Start: 2022-06-16 | End: 2022-07-18

## 2022-06-16 NOTE — NURSING NOTE
"Kim Solomon is a 46 year old female patient that presents today in clinic for the following:    Chief Complaint   Patient presents with     RECHECK     General follow-up     Recheck Medication     omeprazole (PRILOSEC) 40 MG DR capsule     Heartburn     Derm Problem     The patient's allergies and medications were reviewed as noted. A set of vitals were recorded as noted without incident: /83 (BP Location: Right arm, Patient Position: Sitting, Cuff Size: Adult Regular)   Pulse 85   Resp 16   Ht 1.676 m (5' 6\")   Wt 119.8 kg (264 lb 3.2 oz)   LMP 05/31/2022 (Approximate)   SpO2 96%   Breastfeeding No   BMI 42.64 kg/m  . The patient does not have any other questions for the provider.    Kye Foreman, EMT at 9:38 AM on 6/16/2022  "

## 2022-06-16 NOTE — PATIENT INSTRUCTIONS
Pepcid/famotidine can be added for heartburn      Tips to Control Acid Reflux    To control acid reflux, you ll need to make some basic diet and lifestyle changes. The simple steps outlined below may be all you ll need to ease discomfort.   Watch what you eat  Don't have fatty foods or spicy foods.  Eat fewer acidic foods, such as citrus and tomato-based foods. These can increase symptoms.  Limit drinking alcohol, caffeine, and fizzy beverages. All increase acid reflux.  Try limiting chocolate, peppermint, and spearmint. These can make acid reflux worse in some people.     Watch when you eat  Don't lie down for 3 hours after eating.  Don't snack before going to bed.     Raise your head  Raising your head and upper body by 4 to 6 inches helps limit reflux when you re lying down. Put blocks under the head of your bed frame or a wedge under your mattress to raise it.   Other changes  Lose weight, if you need to  Don t exercise near bedtime  Don't wear tight-fitting clothes  Limit aspirin and ibuprofen  Stop smoking          Patient education: Meralgia paresthetica (The Basics)  View in    Written by the doctors and editors at [facility]  Please read the Disclaimer at the end of this page.  What is meralgia paresthetica?  Meralgia paresthetica is a condition that causes pain, tingling, and numbness in the outer thigh. It happens when a nerve in that area gets squeezed or compressed.  Different things can cause meralgia paresthetica. They include pregnancy, wearing tight belts or waistbands, leaning the thigh on something for a long time, and injury to the area. Sometimes, it can happen after surgery in the area.  Meralgia paresthetica is more common in people who have diabetes or obesity, and in older people. It is not a serious condition, and it usually goes away on its own.  What are the symptoms of meralgia paresthetica?  The main symptoms involve the upper, outer thigh. They can include:  ?Pain - Pain can be  "burning or stinging.    ?Tingling - This can feel like \"pins and needles\" in the area.    ?Numbness    ?Feeling extra sensitive to touch - Even light touch, like the feelings of clothing on the skin, might be unpleasant.    ?Itching    Symptoms usually affect only 1 of the thighs.  Will I need tests?  Your doctor should be able to tell if you have meralgia paresthetica by learning about your symptoms and doing a \"neurological exam.\" In a neurological exam, the doctor checks how the brain, nerves, and muscles are working.  Sometimes doctors do other tests to make sure something else is not causing your symptoms. This is more likely if you have any symptoms that are different from the ones listed above. Other tests might include:  ?MRI of the spine - An MRI is a type of imaging test. It creates pictures of the inside of your body.    ?Nerve conduction studies (\"NCS\") or electromyography (\"EMG\") - These tests check how well your nerves and muscles are working.    How is meralgia paresthetica treated?  Meralgia paresthetica usually goes away on its own within a few weeks or months. If your symptoms bother you, it might help to:  ?Avoid wearing tight belts or clothing with a tight waistband. These things can put pressure on the nerve that runs from your lower spine to your thigh.    ?Consider losing weight if you are overweight. Your doctor or nurse can talk to you about healthy ways to lose weight if this is something you want to do.    ?Take pain-relieving medicines such as acetaminophen (brand name: Tylenol) or ibuprofen (sample brand names: Advil, Motrin).    If your symptoms last for longer than 1 or 2 months, tell your doctor or nurse. They might suggest trying other treatments, such as pain medicines or a shot of medicine to numb the area. Sometimes surgery is recommended for people with severe symptoms, but this is rare.     "

## 2022-06-16 NOTE — PROGRESS NOTES
History of Present Illness:  Ms. Solomon is a 46 year old female who presents for  Chief Complaint   Patient presents with     RECHECK     General follow-up     Recheck Medication     omeprazole (PRILOSEC) 40 MG DR capsule     Heartburn     Derm Problem       PMH of SAADIA, obesity, JACOB, depression, ADD, here for follow up    Doing summer school  Visiting [place] in July to visit friend  Had covid early June    Reports numbness and mole, longstanding mole  Numbness is along lateral thigh, no new low back pain, no weakness    Dealing with more GERD in evening, not burning, but having discomfort, intermittent, no dysphagia  Weight is up a bit  No dysphagia, bowel changes, nausea    She saw derm for alopecia areata, states hair grew back      Review of external notes as documented above                   A detailed Review of Systems was performed, verified and is negative except as documented in the HPI.  All health questionnaires were reviewed, verified and relevant information documented above.      Past Medical History:  Past Medical History:   Diagnosis Date     ADD (attention deficit disorder)     on vyvanse[facility]     Alopecia areata      Asthma      Depression      GERD (gastroesophageal reflux disease)      Impaired fasting blood sugar      Iron deficiency     heavy menses     Obesity      SAADIA (obstructive sleep apnea) 10/2015    mild, AHI  5.5, has mandibular adv device       Active Meds:  Current Outpatient Medications   Medication     albuterol 90 MCG/ACT inhaler     cetirizine (ZYRTEC) 10 MG tablet     EPINEPHrine (EPIPEN JR) 0.15 MG/0.3ML injection     fluticasone (VERAMYST) 27.5 MCG/SPRAY spray     lisdexamfetamine (VYVANSE) 30 MG capsule     montelukast (SINGULAIR) 10 MG tablet     omeprazole (PRILOSEC) 40 MG DR capsule     sertraline (ZOLOFT) 100 MG tablet     Current Facility-Administered Medications   Medication     triamcinolone acetonide (KENALOG-10)  "injection 5 mg        Allergies:  Reviewed, refer to EMR    Relevant Social History:  Social History     Tobacco Use     Smoking status: Never Smoker     Smokeless tobacco: Never Used   Substance Use Topics     Alcohol use: No     Comment: occasionally     Drug use: No        Physical Exam:  Vitals: /83 (BP Location: Right arm, Patient Position: Sitting, Cuff Size: Adult Regular)   Pulse 85   Resp 16   Ht 1.676 m (5' 6\")   Wt 119.8 kg (264 lb 3.2 oz)   LMP 05/31/2022 (Approximate)   SpO2 96%   Breastfeeding No   BMI 42.64 kg/m    Constitutional: Alert, oriented, pleasant, no acute distress  Head: Normocephalic, atraumatic  Eyes: Extra-ocular movements intact, pupils equally round bilaterally, no scleral icterus  ENT: Oropharynx clear, moist mucus membranes, good dentition  Neck: Supple, no lymphadenopathy  Cardiovascular: Regular rate and rhythm, no murmurs, rubs or gallops, peripheral pulses full/symmetric  Respiratory: Good air movement bilaterally, lungs clear, no wheezes/rales/rhonchi  GI: Abdomen soft, bowel sounds present, nondistended, nontender, no organomegaly or masses, no rebound/guarding  Musculoskeletal: No edema, normal muscle tone, normal gait  Neurologic: Alert and oriented, cranial nerves 2-12 intact, grossly non-focal  Skin: 3-4 mm raised blueish colored papule on anterior thigh  Psychiatric: normal mentation, affect and mood      Diagnostics:  Labs reviewed in Epic          Assessment and Plan:  Kim was seen today for recheck, recheck medication, heartburn and derm problem.    Diagnoses and all orders for this visit:    Gastroesophageal reflux disease without esophagitis  Discussed weight loss, dietary/lifestyle changes. For breakthrough symptoms, would recommend famodine or ranitidine twice daily prn.    Meralgia paraesthetica, right  Thigh numbness seems most c/w with meralgia paresthetica. Discussed weight loss, loose clothing. Could consider lumbar imaging if " persists.    Attention deficit disorder (ADD) without hyperactivity   Doing well on vyvanse, takes most days.  -     lisdexamfetamine (VYVANSE) 30 MG capsule; Take 1 capsule (30 mg) by mouth every morning    Skin lesion  Looks like a vascular lesion, but would like Derm evaluation.  -     Adult Dermatology Referral            Cookie Donnelly MD  Internal Medicine    >30 minutes spent today performing chart review, history and exam, documentation and further activities as noted above exclusive of any procedures or EKG interpretation

## 2022-06-27 DIAGNOSIS — Z00.00 ROUTINE GENERAL MEDICAL EXAMINATION AT A HEALTH CARE FACILITY: ICD-10-CM

## 2022-06-29 RX ORDER — OMEPRAZOLE 40 MG/1
40 CAPSULE, DELAYED RELEASE ORAL DAILY
Qty: 90 CAPSULE | Refills: 3 | Status: SHIPPED | OUTPATIENT
Start: 2022-06-29 | End: 2023-07-17

## 2022-07-03 DIAGNOSIS — F98.8 ATTENTION DEFICIT DISORDER (ADD) WITHOUT HYPERACTIVITY: ICD-10-CM

## 2022-07-08 RX ORDER — SERTRALINE HYDROCHLORIDE 100 MG/1
TABLET, FILM COATED ORAL
Qty: 60 TABLET | Refills: 11 | Status: SHIPPED | OUTPATIENT
Start: 2022-07-08 | End: 2023-07-26

## 2022-07-10 ASSESSMENT — ANXIETY QUESTIONNAIRES
4. TROUBLE RELAXING: SEVERAL DAYS
GAD7 TOTAL SCORE: 4
GAD7 TOTAL SCORE: 4
2. NOT BEING ABLE TO STOP OR CONTROL WORRYING: SEVERAL DAYS
8. IF YOU CHECKED OFF ANY PROBLEMS, HOW DIFFICULT HAVE THESE MADE IT FOR YOU TO DO YOUR WORK, TAKE CARE OF THINGS AT HOME, OR GET ALONG WITH OTHER PEOPLE?: NOT DIFFICULT AT ALL
7. FEELING AFRAID AS IF SOMETHING AWFUL MIGHT HAPPEN: NOT AT ALL
GAD7 TOTAL SCORE: 4
3. WORRYING TOO MUCH ABOUT DIFFERENT THINGS: NOT AT ALL
6. BECOMING EASILY ANNOYED OR IRRITABLE: SEVERAL DAYS
1. FEELING NERVOUS, ANXIOUS, OR ON EDGE: SEVERAL DAYS
5. BEING SO RESTLESS THAT IT IS HARD TO SIT STILL: NOT AT ALL
7. FEELING AFRAID AS IF SOMETHING AWFUL MIGHT HAPPEN: NOT AT ALL

## 2022-07-10 ASSESSMENT — SLEEP AND FATIGUE QUESTIONNAIRES
HOW LIKELY ARE YOU TO NOD OFF OR FALL ASLEEP WHEN YOU ARE A PASSENGER IN A CAR FOR AN HOUR WITHOUT A BREAK: SLIGHT CHANCE OF DOZING
HOW LIKELY ARE YOU TO NOD OFF OR FALL ASLEEP WHILE LYING DOWN TO REST IN THE AFTERNOON WHEN CIRCUMSTANCES PERMIT: SLIGHT CHANCE OF DOZING
HOW LIKELY ARE YOU TO NOD OFF OR FALL ASLEEP WHILE SITTING INACTIVE IN A PUBLIC PLACE: WOULD NEVER DOZE
HOW LIKELY ARE YOU TO NOD OFF OR FALL ASLEEP WHILE SITTING AND TALKING TO SOMEONE: WOULD NEVER DOZE
HOW LIKELY ARE YOU TO NOD OFF OR FALL ASLEEP IN A CAR, WHILE STOPPED FOR A FEW MINUTES IN TRAFFIC: WOULD NEVER DOZE
HOW LIKELY ARE YOU TO NOD OFF OR FALL ASLEEP WHILE SITTING AND READING: MODERATE CHANCE OF DOZING
HOW LIKELY ARE YOU TO NOD OFF OR FALL ASLEEP WHILE WATCHING TV: SLIGHT CHANCE OF DOZING
HOW LIKELY ARE YOU TO NOD OFF OR FALL ASLEEP WHILE SITTING QUIETLY AFTER LUNCH WITHOUT ALCOHOL: WOULD NEVER DOZE

## 2022-07-11 NOTE — PROGRESS NOTES
Progress Note    SUBJECTIVE:  Kim Solomon is an 46 year old, , who requests an annual Gyn Health Exam.  PCP: Dr. Donnelly     Concerns today include: irregular bleeding    1. Irregular bleeding: Pt reports heavy bleeding in 2018 which prompted her to come to clinic for evaluation in 2019. Since that time, the heavy bleeding has resolved but she has developed irregular periods. She has been getting a period roughly every 90 days with one shortened cycle being 19 days and a few cycles over 90 days (with the longest being 112 days). Menses typically lasts between 2-11 days. She has had much lighter bleeding than normal but occasionally has heavy bleeding for 2 days. Denies presence of clots, dyspareunia, and dysmenorrhea.  Denies spotting between periods. Pt felt like her menses variation was due to stress as she has been a teacher through the pandemic and her mother recently passed away from congestive heart failure.     Menstrual Hx  - irregular (see note above)  - LMP: 22    Sexual Hx:   - Currently sexually active with her   - Uses condoms when sexually active   - Declines STD testing at this time   - Denies sexual concerns     Menstrual History:  Menstrual History 2019   LAST MENSTRUAL PERIOD 2019 - 2022   Menarche Age - 15 -   Period Cycle (Days) - 28 -   Period Duration (Days) - 5 -   Method of Contraception - None -   Period Pattern - Regular -   Menstrual Flow - Heavy -   Menstrual Control - Tampon -   Dysmenorrhea - Moderate -   PMS Symptoms - Cramping -   Reviewed Today - Yes -     Pap smear: 2019 NIL and HPV negative, due 2024     Last    Lab Results   Component Value Date    PAP NIL 2019     History of abnormal Pap smear: NO - age 30-65 PAP every 5 years with negative HPV co-testing recommended    Last   Lab Results   Component Value Date    HPV16 Negative 2019     Last   Lab Results   Component Value Date    HPV18 Negative 2019      Last   Lab Results   Component Value Date    HRHPV Negative 2019     Mammogram current: scheduled today   - Fam hx significant for maternal grandmother diagnosed with Stage 4 metastatic breast cancer at age 80; no genetic testing done. Mat Grandmother is .     HISTORY:  albuterol 90 MCG/ACT inhaler, Inhale 2 puffs into the lungs every 6 hours as needed  cetirizine (ZYRTEC) 10 MG tablet, Take 10 mg by mouth daily.  fluticasone (VERAMYST) 27.5 MCG/SPRAY spray, Spray 2 sprays into both nostrils daily  lisdexamfetamine (VYVANSE) 30 MG capsule, Take 1 capsule (30 mg) by mouth every morning  montelukast (SINGULAIR) 10 MG tablet, TAKE 1 TABLET BY MOUTH EVERY DAY IN THE MORNING.  omeprazole (PRILOSEC) 40 MG DR capsule, Take 1 capsule (40 mg) by mouth daily  sertraline (ZOLOFT) 100 MG tablet, TAKE 2 TABLETS BY MOUTH EVERY DAY  EPINEPHrine (EPIPEN JR) 0.15 MG/0.3ML injection, Inject 0.15 mg into the muscle as needed    triamcinolone acetonide (KENALOG-10) injection 5 mg      Allergies   Allergen Reactions     Diphtheria Toxoid Hives     Medrol [Methylprednisolone Sodium Succinate] Hives     Per allergy testing. Allergic to all oral steroids. OK with topical and inhaled.     No Clinical Screening - See Comments Hives     Orange Fruit      Pcn [Penicillins] Hives     Peanuts [Nuts]      All legumes and related oils     Prednisone Hives     Immunization History   Administered Date(s) Administered     COVID-19,PF,Moderna 2021, 2021, 2021     Flu, Unspecified 10/01/2019, 10/05/2020     Influenza (IIV3) PF 10/01/2015, 10/18/2016     Influenza Quad, Recombinant, pf(RIV4) (Flublok) 10/02/2018     TD (ADULT, 7+) 2019       OB History   No obstetric history on file.     Past Medical History:   Diagnosis Date     ADD (attention deficit disorder)     on vyvanse, [facility]     Alopecia areata      Anemia     See chart     Asthma      Depression       GERD (gastroesophageal reflux disease)      Impaired fasting blood sugar      Iron deficiency     heavy menses     Obesity      SAADIA (obstructive sleep apnea) 10/2015    mild, AHI  5.5, has mandibular adv device     Past Surgical History:   Procedure Laterality Date     ANTROSTOMY NASAL  2011    Procedure:ANTROSTOMY NASAL; Bilateral Maxillary  Antrostomy; Surgeon:JOSELUIS MISHRA; Location:[facility] OR     ENDOSCOPIC SINUS SURGERY  2011    Procedure:ENDOSCOPIC SINUS SURGERY; Bilateral Endoscopic Sinus Surgery Anterior Ethmoidectomy; Surgeon:JOSELUIS MISHRA; Location:[facility] OR     EYE SURGERY       LAPAROSCOPIC CHOLECYSTECTOMY WITH CHOLANGIOGRAMS  9/10/2012    Procedure: LAPAROSCOPIC CHOLECYSTECTOMY WITH CHOLANGIOGRAMS;  Laparoscopic Cholecystectomy,  ;  Surgeon: Juancho Jim MD;  Location: [facility] OR     Family History   Problem Relation Age of Onset     Rheumatoid Arthritis Mother      Hypertension Mother      C.A.D. Mother 69     Heart Failure Mother         on O2 in Assisted Living     Endometrial Cancer Mother         dx 3/18     Breast Cancer Mother         stage 4 metastatic     Uterine Cancer Mother      Cerebrovascular Disease Mother         date of stroke 2014     Anxiety Disorder Mother      Depression Mother      Parkinsonism Father               Hypertension Father      Depression Father      Heart Failure Maternal Grandmother      Breast Cancer Maternal Grandmother      Diabetes Maternal Grandmother      Diabetes Maternal Grandfather      Social History     Socioeconomic History     Marital status:      Spouse name: None     Number of children: None     Years of education: None     Highest education level: None   Tobacco Use     Smoking status: Never Smoker     Smokeless tobacco: Never Used   Substance and Sexual Activity     Alcohol use: No     Comment: occasionally     Drug use: No     Sexual activity: Yes     Partners: Male     Birth control/protection: Condom   Social History  "Narrative    ,  [employer] and [employer] in [employer]     is in IT sales    Has 3 Labradors            Last pap: , normal, no abnormal in past ([facility])        How much exercise per week? 3x week    How much calcium per day? In food      How much caffeine per day? 2 cups    How much vitamin D per day? sunshine    Do you/your family wear seatbelts?  Yes    Do you/your family use safety helmets? Yes    Do you/your family use sunscreen? Yes    Do you/your family keep firearms in the home? Yes    Do you/your family have a smoke detector(s)? Yes        Do you feel safe in your home? Yes    Has anyone ever touched you in an unwanted manner? No     Explain      Sandra Okeefeley    2019       ROS   ROS: Breast/ Pelvic ROS neg other than the symptoms noted above in the HPI.    PHQ-2 Score:     PHQ-2 (  [employer]) 2022   Q1: Little interest or pleasure in doing things 0 0   Q2: Feeling down, depressed or hopeless 0 0   PHQ-2 Score 0 0   PHQ-2 Total Score (12-17 Years)- Positive if 3 or more points; Administer PHQ-A if positive - -   Q1: Little interest or pleasure in doing things Not at all -   Q2: Feeling down, depressed or hopeless Not at all -   PHQ-2 Score 0 -       SNOW-7 SCORE 2019 2022 7/10/2022   Total Score 0 (minimal anxiety) 4 (minimal anxiety) 4 (minimal anxiety)   Total Score 0 4 4     Answers for HPI/ROS submitted by the patient on 7/10/2022  SNOW 7 TOTAL SCORE: 4    EXAM:  Height 1.676 m (5' 6\"), weight 118.7 kg (261 lb 11.2 oz), last menstrual period 2022, not currently breastfeeding. Body mass index is 42.24 kg/m .  General - pleasant female in no acute distress.  Musculoskeletal - no gross deformities.  Neurological - normal strength, sensation, and mental status.  Psych: normal mentation    Breast Exam:  Breast: Without visible skin changes. No dimpling or lesions seen.   Breasts supple, " non-tender with palpation, no dominant mass, nodularity, or nipple discharge noted bilaterally. Axillary nodes negative.      Pelvic Exam:  Deferred by patient.     ASSESSMENT:  Encounter Diagnoses   Name Primary?     Irregular bleeding      Encounter for gynecological examination with abnormal finding Yes     Family history of malignant neoplasm of breast       PLAN:   Orders Placed This Encounter   Procedures     US Transvaginal Pelvic Non-OB     TSH with free T4 reflex     Follicle Stimulating Hormone     Estradiol     Prolactin     Cancer Risk Mgmt/Cancer Genetic Counseling Referral     - Labs ordered for irregular bleeding - FSH, Prolactin, Estradiol, TSH  - TVUS ordered to rule out structural causes of irregular bleeding  - Referral placed for genetic counseling related to her family history of breast and endometrial cancer.  - Encouraged to notify provider for cycles lasting longer than 90 days. Discussed options for endometrial protection and the importance of having a period at least every 3 months if labs indicate pre-menopausal status. Pt is considering options at this time.   - Next pap smear due 8/2024   - Mammogram completed today    Pt will be notified of results and further plan for care determined at that time.   Additional teaching done at this visit regarding self breast exam, birth control and perimenopause.    Return to clinic in one year.  Follow-up as needed.    I, Carolina Lay, am serving as a scribe; to document services personally performed by Archana Martínez based on data collection and the provider's statements to me.     Carolina Lay, BSN, RN, [facility]     I agree with the PFSH and ROS as completed by the NP Student, except for changes made by me.  The remainder of the encounter was performed by me and scribed by the NP Student.  The scribed note accurately reflects my personal services and decisions made by me.  Archana Martínez, DNP, APRN, WHNP

## 2022-07-12 ENCOUNTER — OFFICE VISIT (OUTPATIENT)
Dept: OBGYN | Facility: CLINIC | Age: 47
End: 2022-07-12
Attending: NURSE PRACTITIONER
Payer: COMMERCIAL

## 2022-07-12 ENCOUNTER — ANCILLARY PROCEDURE (OUTPATIENT)
Dept: MAMMOGRAPHY | Facility: CLINIC | Age: 47
End: 2022-07-12
Attending: INTERNAL MEDICINE
Payer: COMMERCIAL

## 2022-07-12 ENCOUNTER — PATIENT OUTREACH (OUTPATIENT)
Dept: ONCOLOGY | Facility: CLINIC | Age: 47
End: 2022-07-12

## 2022-07-12 VITALS — WEIGHT: 261.7 LBS | HEIGHT: 66 IN | BODY MASS INDEX: 42.06 KG/M2

## 2022-07-12 DIAGNOSIS — Z12.31 VISIT FOR SCREENING MAMMOGRAM: ICD-10-CM

## 2022-07-12 DIAGNOSIS — Z80.3 FAMILY HISTORY OF MALIGNANT NEOPLASM OF BREAST: ICD-10-CM

## 2022-07-12 DIAGNOSIS — Z01.411 ENCOUNTER FOR GYNECOLOGICAL EXAMINATION WITH ABNORMAL FINDING: Primary | ICD-10-CM

## 2022-07-12 DIAGNOSIS — N92.6 IRREGULAR BLEEDING: ICD-10-CM

## 2022-07-12 LAB
ESTRADIOL SERPL-MCNC: 10 PG/ML
FSH SERPL-ACNC: 62.1 IU/L
PROLACTIN SERPL 3RD IS-MCNC: 10 NG/ML (ref 5–23)
TSH SERPL DL<=0.005 MIU/L-ACNC: 1.32 MU/L (ref 0.4–4)

## 2022-07-12 PROCEDURE — 36415 COLL VENOUS BLD VENIPUNCTURE: CPT | Performed by: NURSE PRACTITIONER

## 2022-07-12 PROCEDURE — 84146 ASSAY OF PROLACTIN: CPT | Performed by: NURSE PRACTITIONER

## 2022-07-12 PROCEDURE — 84443 ASSAY THYROID STIM HORMONE: CPT | Performed by: NURSE PRACTITIONER

## 2022-07-12 PROCEDURE — 77063 BREAST TOMOSYNTHESIS BI: CPT | Mod: 26 | Performed by: RADIOLOGY

## 2022-07-12 PROCEDURE — 99214 OFFICE O/P EST MOD 30 MIN: CPT | Performed by: NURSE PRACTITIONER

## 2022-07-12 PROCEDURE — 77067 SCR MAMMO BI INCL CAD: CPT

## 2022-07-12 PROCEDURE — 82670 ASSAY OF TOTAL ESTRADIOL: CPT | Performed by: NURSE PRACTITIONER

## 2022-07-12 PROCEDURE — 77067 SCR MAMMO BI INCL CAD: CPT | Mod: 26 | Performed by: RADIOLOGY

## 2022-07-12 PROCEDURE — G0463 HOSPITAL OUTPT CLINIC VISIT: HCPCS

## 2022-07-12 PROCEDURE — 83001 ASSAY OF GONADOTROPIN (FSH): CPT | Performed by: NURSE PRACTITIONER

## 2022-07-12 NOTE — LETTER
2022       RE: Kim Solomon  [address] [place]   [place] [address]     Dear Colleague,    Thank you for referring your patient, Kim Solomon, to the [facility] [place] at [facility]. Please see a copy of my visit note below.    Progress Note    SUBJECTIVE:  Kim Solomon is an 46 year old, , who requests an annual Gyn Health Exam.  PCP: Dr. Donnelly     Concerns today include: irregular bleeding    1. Irregular bleeding: Pt reports heavy bleeding in 2018 which prompted her to come to clinic for evaluation in 2019. Since that time, the heavy bleeding has resolved but she has developed irregular periods. She has been getting a period roughly every 90 days with one shortened cycle being 19 days and a few cycles over 90 days (with the longest being 112 days). Menses typically lasts between 2-11 days. She has had much lighter bleeding than normal but occasionally has heavy bleeding for 2 days. Denies presence of clots, dyspareunia, and dysmenorrhea.  Denies spotting between periods. Pt felt like her menses variation was due to stress as she has been a teacher through the pandemic and her mother recently passed away from congestive heart failure.     Menstrual Hx  - irregular (see note above)  - LMP: 22    Sexual Hx:   - Currently sexually active with her   - Uses condoms when sexually active   - Declines STD testing at this time   - Denies sexual concerns     Menstrual History:  Menstrual History 2019   LAST MENSTRUAL PERIOD 2019 - 2022   Menarche Age - 15 -   Period Cycle (Days) - 28 -   Period Duration (Days) - 5 -   Method of Contraception - None -   Period Pattern - Regular -   Menstrual Flow - Heavy -   Menstrual Control - Tampon -   Dysmenorrhea - Moderate -   PMS Symptoms - Cramping -   Reviewed Today - Yes -     Pap smear: 2019 NIL and HPV negative, due 2024     Last    Lab  Results   Component Value Date    PAP NIL 08/01/2019     History of abnormal Pap smear: NO - age 30-65 PAP every 5 years with negative HPV co-testing recommended    Last   Lab Results   Component Value Date    HPV16 Negative 08/01/2019     Last   Lab Results   Component Value Date    HPV18 Negative 08/01/2019     Last   Lab Results   Component Value Date    HRHPV Negative 08/01/2019     Mammogram current: scheduled today     HISTORY:  albuterol 90 MCG/ACT inhaler, Inhale 2 puffs into the lungs every 6 hours as needed  cetirizine (ZYRTEC) 10 MG tablet, Take 10 mg by mouth daily.  fluticasone (VERAMYST) 27.5 MCG/SPRAY spray, Spray 2 sprays into both nostrils daily  lisdexamfetamine (VYVANSE) 30 MG capsule, Take 1 capsule (30 mg) by mouth every morning  montelukast (SINGULAIR) 10 MG tablet, TAKE 1 TABLET BY MOUTH EVERY DAY IN THE MORNING.  omeprazole (PRILOSEC) 40 MG DR capsule, Take 1 capsule (40 mg) by mouth daily  sertraline (ZOLOFT) 100 MG tablet, TAKE 2 TABLETS BY MOUTH EVERY DAY  EPINEPHrine (EPIPEN JR) 0.15 MG/0.3ML injection, Inject 0.15 mg into the muscle as needed    triamcinolone acetonide (KENALOG-10) injection 5 mg      Allergies   Allergen Reactions     Diphtheria Toxoid Hives     Medrol [Methylprednisolone Sodium Succinate] Hives     Per allergy testing. Allergic to all oral steroids. OK with topical and inhaled.     No Clinical Screening - See Comments Hives     Orange Fruit      Pcn [Penicillins] Hives     Peanuts [Nuts]      All legumes and related oils     Prednisone Hives     Immunization History   Administered Date(s) Administered     COVID-19,PF,Moderna 02/06/2021, 03/06/2021, 12/29/2021     Flu, Unspecified 10/01/2019, 10/05/2020     Influenza (IIV3) PF 10/01/2015, 10/18/2016     Influenza Quad, Recombinant, pf(RIV4) (Flublok) 10/02/2018     TD (ADULT, 7+) 02/01/2019       OB History   No obstetric history on file.     Past Medical History:   Diagnosis Date     ADD (attention deficit disorder)      on vyvanse, [facility]     Alopecia areata      Anemia     See chart     Asthma      Depression      GERD (gastroesophageal reflux disease)      Impaired fasting blood sugar      Iron deficiency     heavy menses     Obesity      SAADIA (obstructive sleep apnea) 10/2015    mild, AHI  5.5, has mandibular adv device     Past Surgical History:   Procedure Laterality Date     ANTROSTOMY NASAL  2011    Procedure:ANTROSTOMY NASAL; Bilateral Maxillary  Antrostomy; Surgeon:JOSELUIS MISHRA; Location:[facility] OR     ENDOSCOPIC SINUS SURGERY  2011    Procedure:ENDOSCOPIC SINUS SURGERY; Bilateral Endoscopic Sinus Surgery Anterior Ethmoidectomy; Surgeon:JOSELUIS MISHRA; [facility]     EYE SURGERY       LAPAROSCOPIC CHOLECYSTECTOMY WITH CHOLANGIOGRAMS  9/10/2012    Procedure: LAPAROSCOPIC CHOLECYSTECTOMY WITH CHOLANGIOGRAMS;  Laparoscopic Cholecystectomy,  ;  Surgeon: Juancho Jim MD;  Location: [facility] OR     Family History   Problem Relation Age of Onset     Rheumatoid Arthritis Mother      Hypertension Mother      C.A.D. Mother 69     Heart Failure Mother         on O2 in Assisted Living     Endometrial Cancer Mother         dx 3/18     Breast Cancer Mother         stage 4 metastatic     Uterine Cancer Mother      Cerebrovascular Disease Mother         date of stroke 2014     Anxiety Disorder Mother      Depression Mother      Parkinsonism Father               Hypertension Father      Depression Father      Heart Failure Maternal Grandmother      Breast Cancer Maternal Grandmother      Diabetes Maternal Grandmother      Diabetes Maternal Grandfather      Social History     Socioeconomic History     Marital status:      Spouse name: None     Number of children: None     Years of education: None     Highest education level: None   Tobacco Use     Smoking status: Never Smoker     Smokeless tobacco: Never Used   Substance and Sexual Activity     Alcohol use: No  "    Comment: occasionally     Drug use: No     Sexual activity: Yes     Partners: Male     Birth control/protection: Condom   Social History Narrative    ,  [email or web address] and [employer] in [employer]     is in IT sales    Has 3 Labradors            Last pap: , normal, no abnormal in past ([facility])        How much exercise per week? 3x week    How much calcium per day? In food      How much caffeine per day? 2 cups    How much vitamin D per day? sunshine    Do you/your family wear seatbelts?  Yes    Do you/your family use safety helmets? Yes    Do you/your family use sunscreen? Yes    Do you/your family keep firearms in the home? Yes    Do you/your family have a smoke detector(s)? Yes        Do you feel safe in your home? Yes    Has anyone ever touched you in an unwanted manner? No     Explain      Sandra Montoya    2019       ROS   ROS: Breast/ Pelvic ROS neg other than the symptoms noted above in the HPI.    PHQ-2 Score:     PHQ-2 (  [employer]) 2022   Q1: Little interest or pleasure in doing things 0 0   Q2: Feeling down, depressed or hopeless 0 0   PHQ-2 Score 0 0   PHQ-2 Total Score (12-17 Years)- Positive if 3 or more points; Administer PHQ-A if positive - -   Q1: Little interest or pleasure in doing things Not at all -   Q2: Feeling down, depressed or hopeless Not at all -   PHQ-2 Score 0 -       SNOW-7 SCORE 2019 2022 7/10/2022   Total Score 0 (minimal anxiety) 4 (minimal anxiety) 4 (minimal anxiety)   Total Score 0 4 4     Answers for HPI/ROS submitted by the patient on 7/10/2022  SNOW 7 TOTAL SCORE: 4    EXAM:  Height 1.676 m (5' 6\"), weight 118.7 kg (261 lb 11.2 oz), last menstrual period 2022, not currently breastfeeding. Body mass index is 42.24 kg/m .  General - pleasant female in no acute distress.  Musculoskeletal - no gross deformities.  Neurological - normal strength, sensation, and " mental status.  Psych: normal mentation    Breast Exam:  Breast: Without visible skin changes. No dimpling or lesions seen.   Breasts supple, non-tender with palpation, no dominant mass, nodularity, or nipple discharge noted bilaterally. Axillary nodes negative.      Pelvic Exam:  Deferred by patient.     ASSESSMENT:  Encounter Diagnoses   Name Primary?     Irregular bleeding      Encounter for gynecological examination with abnormal finding Yes     Family history of malignant neoplasm of breast       PLAN:   Orders Placed This Encounter   Procedures     US Transvaginal Pelvic Non-OB     TSH with free T4 reflex     Follicle Stimulating Hormone     Estradiol     Prolactin     Cancer Risk Mgmt/Cancer Genetic Counseling Referral     - Labs ordered for irregular bleeding - FSH, Prolactin, Estradiol, TSH  - TVUS ordered to rule out structural causes of irregular bleeding  - Referral placed for genetic counseling related to her family history of breast and endometrial cancer.  - Encouraged to notify provider for cycles lasting longer than 90 days. Discussed options for endometrial protection and the importance of having a period at least every 3 months if labs indicate pre-menopausal status. Pt is considering options at this time.   - Next pap smear due 8/2024   - Mammogram completed today    Pt will be notified of results and further plan for care determined at that time.   Additional teaching done at this visit regarding self breast exam, birth control and perimenopause.    Return to clinic in one year.  Follow-up as needed.    I, Carolina Lay, am serving as a scribe; to document services personally performed by Archana Martínez based on data collection and the provider's statements to me.     Carolina Lay, BSN, RN, [facility]     I agree with the PFSH and ROS as completed by the NP Student, except for changes made by me.  The remainder of the encounter was performed by me and scribed by the NP  Student.  The scribed note accurately reflects my personal services and decisions made by me.  Archana Martínez, DNP, APRN, WHNP

## 2022-07-13 ENCOUNTER — OFFICE VISIT (OUTPATIENT)
Dept: SLEEP MEDICINE | Facility: CLINIC | Age: 47
End: 2022-07-13
Payer: COMMERCIAL

## 2022-07-13 VITALS
SYSTOLIC BLOOD PRESSURE: 144 MMHG | OXYGEN SATURATION: 95 % | HEART RATE: 86 BPM | BODY MASS INDEX: 42.11 KG/M2 | HEIGHT: 66 IN | DIASTOLIC BLOOD PRESSURE: 81 MMHG | WEIGHT: 262 LBS

## 2022-07-13 DIAGNOSIS — G47.33 OSA (OBSTRUCTIVE SLEEP APNEA): Primary | ICD-10-CM

## 2022-07-13 PROCEDURE — 99204 OFFICE O/P NEW MOD 45 MIN: CPT | Performed by: INTERNAL MEDICINE

## 2022-07-13 RX ORDER — ALBUTEROL SULFATE 90 UG/1
2 AEROSOL, METERED RESPIRATORY (INHALATION) EVERY 4 HOURS PRN
COMMUNITY
Start: 2022-06-03 | End: 2023-02-01

## 2022-07-13 NOTE — NURSING NOTE
Pt. To follow up in 1 year for cpap, sent pt. mychart message for 1 year follow appointment reminder.
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
## 2022-07-18 ENCOUNTER — MYC REFILL (OUTPATIENT)
Dept: INTERNAL MEDICINE | Facility: CLINIC | Age: 47
End: 2022-07-18

## 2022-07-18 DIAGNOSIS — F98.8 ATTENTION DEFICIT DISORDER (ADD) WITHOUT HYPERACTIVITY: ICD-10-CM

## 2022-07-20 RX ORDER — LISDEXAMFETAMINE DIMESYLATE 30 MG/1
30 CAPSULE ORAL EVERY MORNING
Qty: 30 CAPSULE | Refills: 0 | Status: SHIPPED | OUTPATIENT
Start: 2022-07-20 | End: 2022-12-15

## 2022-07-21 ENCOUNTER — OFFICE VISIT (OUTPATIENT)
Dept: DERMATOLOGY | Facility: CLINIC | Age: 47
End: 2022-07-21
Attending: INTERNAL MEDICINE
Payer: COMMERCIAL

## 2022-07-21 DIAGNOSIS — D49.2 NEOPLASM OF UNSPECIFIED BEHAVIOR OF BONE, SOFT TISSUE, AND SKIN: Primary | ICD-10-CM

## 2022-07-21 DIAGNOSIS — D22.9 MULTIPLE BENIGN NEVI: ICD-10-CM

## 2022-07-21 DIAGNOSIS — D18.01 CHERRY ANGIOMA: ICD-10-CM

## 2022-07-21 DIAGNOSIS — L81.4 LENTIGINES: ICD-10-CM

## 2022-07-21 DIAGNOSIS — L82.1 SEBORRHEIC KERATOSES: ICD-10-CM

## 2022-07-21 PROCEDURE — 88305 TISSUE EXAM BY PATHOLOGIST: CPT | Mod: TC | Performed by: DERMATOLOGY

## 2022-07-21 PROCEDURE — 88305 TISSUE EXAM BY PATHOLOGIST: CPT | Mod: 26 | Performed by: DERMATOLOGY

## 2022-07-21 PROCEDURE — 11102 TANGNTL BX SKIN SINGLE LES: CPT | Mod: GC | Performed by: DERMATOLOGY

## 2022-07-21 PROCEDURE — 99213 OFFICE O/P EST LOW 20 MIN: CPT | Mod: 25 | Performed by: DERMATOLOGY

## 2022-07-21 ASSESSMENT — PAIN SCALES - GENERAL: PAINLEVEL: NO PAIN (0)

## 2022-07-21 NOTE — LETTER
7/21/2022       RE: Kim Solomon  [address]  [place] [address]     Dear Colleague,    Thank you for referring your patient, Kim Solomon, to the [facility]MAT[facility] [place] at [facility]. Please see a copy of my visit note below.    [facility] Dermatology Note  Encounter Date: Jul 21, 2022  Office Visit     Dermatology Problem List:  1. R anterior thigh lesion, s/p bx 7/21/2022; DDx cherry angioma  2. Alopecia areata - now quiescent  - s/p IL triamcinolone 12/11/2019, 1/16/2020, 2/20/2020  - fluocinonide 0.05% solution BIW for scalp  - OTC dandruff shampoo    ____________________________________________    Assessment & Plan:     1. R anterior thigh lesion  Ddx cherry angioma vs less likely nodular melanoma vs angiokeratoma vs melanocytic nevus vs pigmented BCC. Most likely benign vascular neoplasm though history of recent growth makes biopsy reasonable. Of note, patient requested for call from provider with her pathology results and stated she preferred not to see the results as soon as they could be available in [facility].  - Differential diagnosis discussed with patient  - Biopsied today, see note below, pathology pending    2. Benign skin findings include seborrheic keratosis, lentigines, benign melanocytic nevi and cherry angiomas  * chronic uncomplicated  - lesions benign in nature, no treatment is indicated at this time, will monitor for any clinical changes      Procedures Performed:   - Shave biopsy procedure note, location(s): R anterior thigh. After discussion of benefits and risks including but not limited to bleeding, infection, scar, incomplete removal, recurrence, and non-diagnostic biopsy, written consent and photographs were obtained. The area was cleaned with isopropyl alcohol. 0.5mL of 1% lidocaine with epinephrine was injected to obtain adequate anesthesia of lesion(s). Shave biopsy at site(s)  "performed. Hemostasis was achieved with aluminium chloride. Petrolatum ointment and a sterile dressing were applied. The patient tolerated the procedure and no complications were noted. The patient was provided with verbal and written post care instructions.     Follow-up: pending path results    Staff and Resident:     Blair Lott MD  Medicine/Dermatology PGY-2  *[phone]    Staff Physician Comments:   I saw and evaluated the patient with the resident and I edited the assessment and plan as documented in the note. I was present for the entire major procedure and examination.    Tonny Krishnan MD   of Dermatology  Department of Dermatology  [facility]      ____________________________________________    CC: Derm Problem (Kim is here today for a lesion of concern)    HPI:  Ms. Kim Solomon is a(n) 46 year old female who presents today as a new patient for lesion of concern.    - Mole on thigh \"don't remember it looking...like it does\" \"it bothers my  more than me, honestly\"  - First noticed 3 years ago, hasn't changed in size but has become darker, more raised. Started, she thinks, as light brown similar to her other moles.  - Referred by PCP to derm after they looked at it  - Doesn't bother her unless she starts to \"really poke at it\" at which point there is tenderness.  - Doesn't itch, bleed, weep, burn, hurt, tingle, or otherwise cause symptoms.  - Family hx: maternal grandmother and mother NMSC  - Blistering sunburns: no  - Tanning bed use: in highschool x6    Patient is otherwise feeling well, without additional skin concerns.    Labs Reviewed:  N/A    Physical Exam:  Vitals: LMP 05/31/2022 (Approximate)   SKIN: Sun-exposed skin, which includes the head/face, neck, both arms, digits, and/or nails was examined.   - On the anterior R thigh there is a 5 mm deeply violaceous bluish-red papule with round / ovoid dark red to purple globular " structures on dermoscopy  - There are scattered dome shaped bright red papules.   - Multiple regular brown pigmented macules and papules are identified.   - Scattered brown macules on sun exposed areas.  - There are waxy stuck on tan to brown papules.  - No other lesions of concern on areas examined.     Medications:  Current Outpatient Medications   Medication     albuterol (PROAIR HFA/PROVENTIL HFA/VENTOLIN HFA) 108 (90 Base) MCG/ACT inhaler     albuterol 90 MCG/ACT inhaler     cetirizine (ZYRTEC) 10 MG tablet     EPINEPHrine (EPIPEN JR) 0.15 MG/0.3ML injection     fluticasone (VERAMYST) 27.5 MCG/SPRAY spray     lisdexamfetamine (VYVANSE) 30 MG capsule     montelukast (SINGULAIR) 10 MG tablet     omeprazole (PRILOSEC) 40 MG DR capsule     sertraline (ZOLOFT) 100 MG tablet     Current Facility-Administered Medications   Medication     triamcinolone acetonide (KENALOG-10) injection 5 mg      Past Medical History:   Patient Active Problem List   Diagnosis     Allergic rhinitis     Iron deficiency anemia     Undifferentiated inflammatory arthritis (H)     S/P laparoscopic cholecystectomy     Abdominal pain, unspecified abdominal location     SAADIA (obstructive sleep apnea)     Morbid obesity (H)     Past Medical History:   Diagnosis Date     ADD (attention deficit disorder)     on [facility]se, [facility]     Alopecia areata      Anemia     See chart     Asthma      Depression      GERD (gastroesophageal reflux disease)      Impaired fasting blood sugar      Iron deficiency     heavy menses     Obesity      SAADIA (obstructive sleep apnea) 10/2015    mild, AHI  5.5, has mandibular adv device       CC Cookie Donnelly MD  [address] [place] [phone] on close of this encounter.

## 2022-07-21 NOTE — NURSING NOTE
Lidocaine-epinephrine 1-1:135826 % injection   2mL once for one use, starting 7/21/2022 ending 7/21/2022,  2mL disp, R-0, injection  Injected by Girish Claire CMA

## 2022-07-21 NOTE — NURSING NOTE
Dermatology Rooming Note    Kim Solomon's goals for this visit include:   Chief Complaint   Patient presents with     Derm Problem     Kim is here today for a lesion of concern     Girish Claire, CARLI

## 2022-07-21 NOTE — PROGRESS NOTES
[facility] Dermatology Note  Encounter Date: Jul 21, 2022  Office Visit     Dermatology Problem List:  1. R anterior thigh lesion, s/p bx 7/21/2022; DDx cherry angioma  2. Alopecia areata - now quiescent  - s/p IL triamcinolone 12/11/2019, 1/16/2020, 2/20/2020  - fluocinonide 0.05% solution BIW for scalp  - OTC dandruff shampoo    ____________________________________________    Assessment & Plan:     1. R anterior thigh lesion  Ddx cherry angioma vs less likely nodular melanoma vs angiokeratoma vs melanocytic nevus vs pigmented BCC. Most likely benign vascular neoplasm though history of recent growth makes biopsy reasonable. Of note, patient requested for call from provider with her pathology results and stated she preferred not to see the results as soon as they could be available in [employer].  - Differential diagnosis discussed with patient  - Biopsied today, see note below, pathology pending    2. Benign skin findings include seborrheic keratosis, lentigines, benign melanocytic nevi and cherry angiomas  * chronic uncomplicated  - lesions benign in nature, no treatment is indicated at this time, will monitor for any clinical changes      Procedures Performed:   - Shave biopsy procedure note, location(s): R anterior thigh. After discussion of benefits and risks including but not limited to bleeding, infection, scar, incomplete removal, recurrence, and non-diagnostic biopsy, written consent and photographs were obtained. The area was cleaned with isopropyl alcohol. 0.5mL of 1% lidocaine with epinephrine was injected to obtain adequate anesthesia of lesion(s). Shave biopsy at site(s) performed. Hemostasis was achieved with aluminium chloride. Petrolatum ointment and a sterile dressing were applied. The patient tolerated the procedure and no complications were noted. The patient was provided with verbal and written post care instructions.     Follow-up: pending path results    Staff and Resident:  "    Blair Lott MD  Medicine/Dermatology PGY-2  *[id number]    Staff Physician Comments:   I saw and evaluated the patient with the resident and I edited the assessment and plan as documented in the note. I was present for the entire major procedure and examination.    Tonny Krishnan MD   of Dermatology  Department of Dermatology  [facility]      ____________________________________________    CC: Derm Problem (Kim is here today for a lesion of concern)    HPI:  Ms. Kim Solomon is a(n) 46 year old female who presents today as a new patient for lesion of concern.    - Mole on thigh \"don't remember it looking...like it does\" \"it bothers my  more than me, honestly\"  - First noticed 3 years ago, hasn't changed in size but has become darker, more raised. Started, she thinks, as light brown similar to her other moles.  - Referred by PCP to derm after they looked at it  - Doesn't bother her unless she starts to \"really poke at it\" at which point there is tenderness.  - Doesn't itch, bleed, weep, burn, hurt, tingle, or otherwise cause symptoms.  - Family hx: maternal grandmother and mother NMSC  - Blistering sunburns: no  - Tanning bed use: in highschool x6    Patient is otherwise feeling well, without additional skin concerns.    Labs Reviewed:  N/A    Physical Exam:  Vitals: LMP 05/31/2022 (Approximate)   SKIN: Sun-exposed skin, which includes the head/face, neck, both arms, digits, and/or nails was examined.   - On the anterior R thigh there is a 5 mm deeply violaceous bluish-red papule with round / ovoid dark red to purple globular structures on dermoscopy  - There are scattered dome shaped bright red papules.   - Multiple regular brown pigmented macules and papules are identified.   - Scattered brown macules on sun exposed areas.  - There are waxy stuck on tan to brown papules.  - No other lesions of concern on areas examined.     Medications:  Current " Outpatient Medications   Medication     albuterol (PROAIR HFA/PROVENTIL HFA/VENTOLIN HFA) 108 (90 Base) MCG/ACT inhaler     albuterol 90 MCG/ACT inhaler     cetirizine (ZYRTEC) 10 MG tablet     EPINEPHrine (EPIPEN JR) 0.15 MG/0.3ML injection     fluticasone (VERAMYST) 27.5 MCG/SPRAY spray     lisdexamfetamine (VYVANSE) 30 MG capsule     montelukast (SINGULAIR) 10 MG tablet     omeprazole (PRILOSEC) 40 MG DR capsule     sertraline (ZOLOFT) 100 MG tablet     Current Facility-Administered Medications   Medication     triamcinolone acetonide (KENALOG-10) injection 5 mg      Past Medical History:   Patient Active Problem List   Diagnosis     Allergic rhinitis     Iron deficiency anemia     Undifferentiated inflammatory arthritis (H)     S/P laparoscopic cholecystectomy     Abdominal pain, unspecified abdominal location     SAADIA (obstructive sleep apnea)     Morbid obesity (H)     Past Medical History:   Diagnosis Date     ADD (attention deficit disorder)     on vyvanse, [facility]     Alopecia areata      Anemia     See chart     Asthma      Depression      GERD (gastroesophageal reflux disease)      Impaired fasting blood sugar      Iron deficiency     heavy menses     Obesity      SAADIA (obstructive sleep apnea) 10/2015    mild, AHI  5.5, has mandibular adv device       CC Cookie Donnelly MD  [address] [place] [phone] on close of this encounter.

## 2022-07-21 NOTE — PATIENT INSTRUCTIONS
Wound Care After a Biopsy    What is a skin biopsy?  A skin biopsy allows the doctor to examine a very small piece of tissue under the microscope to determine the diagnosis and the best treatment for the skin condition. A local anesthetic (numbing medicine)  is injected with a very small needle into the skin area to be tested. A small piece of skin is taken from the area. Sometimes a suture (stitch) is used.     What are the risks of a skin biopsy?  I will experience scar, bleeding, swelling, pain, crusting and redness. I may experience incomplete removal or recurrence. Risks of this procedure are excessive bleeding, bruising, infection, nerve damage, numbness, thick (hypertrophic or keloidal) scar and non-diagnostic biopsy.    How should I care for my wound for the first 24 hours?  Keep the wound dry and covered for 24 hours  If it bleeds, hold direct pressure on the area for 15 minutes. If bleeding does not stop then go to the emergency room  Avoid strenuous exercise the first 1-2 days or as your doctor instructs you    How should I care for the wound after 24 hours?  After 24 hours, remove the bandage  You may bathe or shower as normal  If you had a scalp biopsy, you can shampoo as usual and can use shower water to clean the biopsy site daily  Clean the wound twice a day with gentle soap and water  Do not scrub, be gentle  Apply white petroleum/Vaseline after cleaning the wound with a cotton swab or a clean finger, and keep the site covered with a Bandaid /bandage. Bandages are not necessary with a scalp biopsy  If you are unable to cover the site with a Bandaid /bandage, re-apply ointment 2-3 times a day to keep the site moist. Moisture will help with healing  Avoid strenuous activity for first 1-2 days  Avoid lakes, rivers, pools, and oceans until the stitches are removed or the site is healed    How do I clean my wound?  Wash hands thoroughly with soap or use hand  before all wound care  Clean the  wound with gentle soap and water  Apply white petroleum/Vaseline  to wound after it is clean  Replace the Bandaid /bandage to keep the wound covered for the first few days or as instructed by your doctor  If you had a scalp biopsy, warm shower water to the area on a daily basis should suffice    What should I use to clean my wound?   Cotton-tipped applicators (Qtips )  White petroleum jelly (Vaseline ). Use a clean new container and use Q-tips to apply.  Bandaids   as needed  Gentle soap     How should I care for my wound long term?  Do not get your wound dirty  Keep up with wound care for one week or until the area is healed.  A small scab will form and fall off by itself when the area is completely healed. The area will be red and will become pink in color as it heals. Sun protection is very important for how your scar will turn out. Sunscreen with an SPF 30 or greater is recommended once the area is healed.  If you have stitches, stitches need to be removed in 14 days. You may return to our clinic for this or you may have it done locally at your doctor s office.  You should have some soreness but it should be mild and slowly go away over several days. Talk to your doctor about using tylenol for pain,    When should I call my doctor?  If you have increased:   Pain or swelling  Pus or drainage (clear or slightly yellow drainage is ok)  Temperature over 100F  Spreading redness or warmth around wound    When will I hear about my results?  The biopsy results can take 2 weeks to come back.  Your results will automatically release to [facility] before your provider has even reviewed them.  The clinic will call you with the results, send you a [employer] message, or have you schedule a follow-up clinic or phone time to discuss the results.  Contact our clinics if you do not hear from us in 2 weeks.    Who should I call with questions?  [facility]: [phone]  [facility]  [place]: [phone]  For urgent needs outside of business hours call the [facility] at [phone] and ask for the dermatology resident on call

## 2022-07-22 LAB
PATH REPORT.COMMENTS IMP SPEC: NORMAL
PATH REPORT.COMMENTS IMP SPEC: NORMAL
PATH REPORT.FINAL DX SPEC: NORMAL
PATH REPORT.GROSS SPEC: NORMAL
PATH REPORT.MICROSCOPIC SPEC OTHER STN: NORMAL
PATH REPORT.RELEVANT HX SPEC: NORMAL

## 2022-07-25 NOTE — RESULT ENCOUNTER NOTE
Ms. Solomon,  The spot on your leg was a benign cherry angioma. No further treatment is indicated.  Thank you,  Tonny Krishnan MD, FAAD   of Dermatology  Department of Dermatology  [facility]

## 2022-07-27 ENCOUNTER — ANCILLARY PROCEDURE (OUTPATIENT)
Dept: ULTRASOUND IMAGING | Facility: CLINIC | Age: 47
End: 2022-07-27
Attending: NURSE PRACTITIONER
Payer: COMMERCIAL

## 2022-07-27 DIAGNOSIS — N92.6 IRREGULAR BLEEDING: ICD-10-CM

## 2022-07-27 PROCEDURE — 76830 TRANSVAGINAL US NON-OB: CPT

## 2022-07-27 PROCEDURE — 76830 TRANSVAGINAL US NON-OB: CPT | Mod: 26 | Performed by: OBSTETRICS & GYNECOLOGY

## 2022-08-02 ENCOUNTER — E-VISIT (OUTPATIENT)
Dept: INTERNAL MEDICINE | Facility: CLINIC | Age: 47
End: 2022-08-02
Payer: COMMERCIAL

## 2022-08-02 ENCOUNTER — TELEPHONE (OUTPATIENT)
Dept: INTERNAL MEDICINE | Facility: CLINIC | Age: 47
End: 2022-08-02

## 2022-08-02 DIAGNOSIS — J06.9 UPPER RESPIRATORY TRACT INFECTION, UNSPECIFIED TYPE: Primary | ICD-10-CM

## 2022-08-02 PROCEDURE — 99207 PR NON-BILLABLE SERV PER CHARTING: CPT | Performed by: INTERNAL MEDICINE

## 2022-08-02 NOTE — TELEPHONE ENCOUNTER
Bere, patient submitted evisit for multiple symptoms including worsening asthma not responsive to inhaler.  I advise in clinic appt for exam/eval.  Do we have anything in next 1-2 days.  Can you help her get an appt with any provider or NP?    Thanks, Cookie Donnelly MD  Internal Medicine

## 2022-08-02 NOTE — TELEPHONE ENCOUNTER
RN left voice message with Dr. Donnelly' message, and gave [facility] scheduling number so patient can call back and schedule an in person appt in 1-2 days with any provider.    Bere Berumen RN on 8/2/2022 at 3:45 PM

## 2022-08-03 ENCOUNTER — ANCILLARY PROCEDURE (OUTPATIENT)
Dept: GENERAL RADIOLOGY | Facility: CLINIC | Age: 47
End: 2022-08-03
Attending: INTERNAL MEDICINE
Payer: COMMERCIAL

## 2022-08-03 ENCOUNTER — OFFICE VISIT (OUTPATIENT)
Dept: INTERNAL MEDICINE | Facility: CLINIC | Age: 47
End: 2022-08-03
Payer: COMMERCIAL

## 2022-08-03 VITALS
DIASTOLIC BLOOD PRESSURE: 83 MMHG | WEIGHT: 262 LBS | HEIGHT: 66 IN | BODY MASS INDEX: 42.11 KG/M2 | HEART RATE: 73 BPM | OXYGEN SATURATION: 97 % | SYSTOLIC BLOOD PRESSURE: 136 MMHG | RESPIRATION RATE: 16 BRPM

## 2022-08-03 DIAGNOSIS — R05.9 COUGH: ICD-10-CM

## 2022-08-03 DIAGNOSIS — R05.9 COUGH: Primary | ICD-10-CM

## 2022-08-03 PROCEDURE — 71046 X-RAY EXAM CHEST 2 VIEWS: CPT | Performed by: RADIOLOGY

## 2022-08-03 PROCEDURE — 99213 OFFICE O/P EST LOW 20 MIN: CPT | Performed by: INTERNAL MEDICINE

## 2022-08-03 RX ORDER — DOXYCYCLINE 100 MG/1
CAPSULE ORAL
COMMUNITY
Start: 2022-07-29 | End: 2022-12-15

## 2022-08-03 NOTE — PATIENT INSTRUCTIONS
The symptoms you describe suggest a viral cause, which is much more common than a bacterial cause. Antibiotics will treat bacterial infections, but have no effect on viral infections. If possible, especially if improving, start with symptom care for the first 7-10 days, then consider seeking further treatment or taking an antibiotic. Bacterial infections generally are more severe, including symptoms such as pus, fever over 101degrees F, or rapidly worsening.  Thank you for choosing us for your care. I think an in-clinic visit would be best next steps based on your symptoms. Please schedule a clinic appointment; you won t be charged for this eVisit.      You can schedule an appointment right here in [employer], or call [phone]

## 2022-08-03 NOTE — NURSING NOTE
"Kim Solomon is a 47 year old female patient that presents today in clinic for the following:    Chief Complaint   Patient presents with     Sinus Problem     Asthma     Follow Up     Nasal Congestion     The patient's allergies and medications were reviewed as noted. A set of vitals were recorded as noted without incident: /83 (BP Location: Right arm, Patient Position: Sitting, Cuff Size: Adult Regular)   Pulse 73   Resp 16   Ht 1.676 m (5' 6\")   Wt 118.8 kg (262 lb)   SpO2 97%   Breastfeeding No   BMI 42.29 kg/m  . The patient does not have any other questions for the provider.    Kye Foreman, EMT at 2:21 PM on 8/3/2022  "

## 2022-08-17 ENCOUNTER — OFFICE VISIT (OUTPATIENT)
Dept: OBGYN | Facility: CLINIC | Age: 47
End: 2022-08-17
Payer: COMMERCIAL

## 2022-08-17 VITALS
WEIGHT: 262 LBS | DIASTOLIC BLOOD PRESSURE: 82 MMHG | HEART RATE: 73 BPM | SYSTOLIC BLOOD PRESSURE: 141 MMHG | BODY MASS INDEX: 42.11 KG/M2 | HEIGHT: 66 IN

## 2022-08-17 DIAGNOSIS — N80.03 ADENOMYOSIS: ICD-10-CM

## 2022-08-17 DIAGNOSIS — Z30.430 ENCOUNTER FOR IUD INSERTION: ICD-10-CM

## 2022-08-17 DIAGNOSIS — N92.1 METRORRHAGIA: ICD-10-CM

## 2022-08-17 DIAGNOSIS — N92.6 IRREGULAR BLEEDING: Primary | ICD-10-CM

## 2022-08-17 DIAGNOSIS — N92.1 MENORRHAGIA WITH IRREGULAR CYCLE: ICD-10-CM

## 2022-08-17 DIAGNOSIS — Z30.430 ENCOUNTER FOR INSERTION OF INTRAUTERINE CONTRACEPTIVE DEVICE: ICD-10-CM

## 2022-08-17 LAB
HCG UR QL: NEGATIVE
INTERNAL QC OK POCT: NORMAL
POCT KIT EXPIRATION DATE: NORMAL
POCT KIT LOT NUMBER: NORMAL

## 2022-08-17 PROCEDURE — 58300 INSERT INTRAUTERINE DEVICE: CPT

## 2022-08-17 PROCEDURE — 88305 TISSUE EXAM BY PATHOLOGIST: CPT | Mod: TC | Performed by: OBSTETRICS & GYNECOLOGY

## 2022-08-17 PROCEDURE — 250N000011 HC RX IP 250 OP 636: Performed by: OBSTETRICS & GYNECOLOGY

## 2022-08-17 PROCEDURE — 81025 URINE PREGNANCY TEST: CPT

## 2022-08-17 PROCEDURE — 88305 TISSUE EXAM BY PATHOLOGIST: CPT | Mod: 26 | Performed by: PATHOLOGY

## 2022-08-17 PROCEDURE — G0463 HOSPITAL OUTPT CLINIC VISIT: HCPCS

## 2022-08-17 PROCEDURE — 58100 BIOPSY OF UTERUS LINING: CPT

## 2022-08-17 RX ADMIN — LEVONORGESTREL 20 MCG: 52 INTRAUTERINE DEVICE INTRAUTERINE at 16:49

## 2022-08-17 NOTE — PROCEDURES
"  [facility]  [facility]  ENDOMETRIAL BIOPSY PROCEDURE NOTE  2022    PATIENT INFORMATION:   Subjective: patient with history of menorrhagia. Please refer to clinic note from same date for additional information  LMP: 22  Current contraception: none  No unprotected intercourse in the last week.  Urine pregnancy test was negative.    OB History    Para Term  AB Living   1 0 0 0 1 0   SAB IAB Ectopic Multiple Live Births   0 0 0 0 0      # Outcome Date GA Lbr Chap[place]/2nd Weight Sex Delivery Anes PTL Lv   1 AB                BP (!) 141/82   Pulse 73   Ht 1.676 m (5' 6\")   Wt 118.8 kg (262 lb)   BMI 42.29 kg/m      Pelvic Exam: External genitalia and vagina normal. Bimanual and rectovaginal normal.    PREOPERATIVE DIAGNOSIS: Menorrhagia, Possible Adenomyosis    POSTOPERATIVE DIAGNOSIS: Same    PROCEDURE PERFORMED: Endometrial biopsy    ANESTHESIA: none    CONSENT: Her questions were answered.  She was informed about the risks, benefits and alternatives to biopsy.  She verbalized understanding of the following risks:  infection, bleeding, perforation, the possibility of the inability to complete the procedure and/or the need for future procedures.  Written informed consent was obtained and scanned into the medical record.  Patient received and verbalized understanding of discharge instrcutions.    UNIVERSAL PROTOCOL/ TIMEOUT: \"Pause for the Cause\"  Preprocedure verification is complete - patient verified and consents confirmed, procedure sites are identified and marked.  Timeout was called before the start of the procedure, through verbal and active participation of team members, the patient's name,  and procedure to be performed were verified.                                 PROCEDURE DETAILS:   The patient was placed in the dorsal lithotomy position. A speculum was inserted in the vagina, and the cervix visualized.  Cervix " was swapped with betadine. n endometrial biopsy pipelle was then introduced through the cervical os into the uterus and the plunger was deployed with good suction. The pipelle was rotated to collect a thorough sample. All samples were scant. The tenaculum was removed from the cervix and good hemostasis was noted.     EBL: <5 mL  Complications: none noted.    Specimen(s): Endometrial biopsy     Patient tolerated the procedure well.    Dr. Zaragoza was present during entirety of procedure.     Denny Lara DO, MS  OBGYN Resident, PGY2  [facility]  08/17/2022 4:37 PM

## 2022-08-17 NOTE — PATIENT INSTRUCTIONS
IUD information:     Benefits: The IUD can be 97-99% effective when carefully following directions regarding use. It can be more effective if used with additional contraception. IUD containing progestin may decrease menstrual flow and menstrual cramping.     Risks/Side Effects: include but are not limited to spotting, bleeding, hemorrhage, or anemia: cramping or pain: partial or complete expulsion of device; lost IUD strings; uterine or cervical perforation; embedding of IUD in the uterine wall; increased risk of pelvic inflammatory disease. Women who become pregnant with an IUD in place are at a higher risk for ectopic pregnancy should a pregnancy occur with an IUD in situ. There is a higher rate of miscarriage when pregnancy occurs with IUD in place.    Warning signs: Please call clinic if you have abnormal spotting or heavy bleeding, abdominal pain, dyspareunia, fever, chills, flu like symptoms, or unable to locate strings of IUD, or strings are longer or shorter than expected.    You are encouraged to use condoms for prevention of STD. You may also need back up contraception for 7 days with your IUD. You may use pain medications (ibuprofen) as needed for mildt to moderate pain. Please follow-up in clinic in 4-6 weeks for IUD string check if unable to find strings or as directed by provider.

## 2022-08-17 NOTE — PROCEDURES
"[facility]  [facility]  IUD INSERTION PROCEDURE NOTE    Is a pregnancy test required: Yes.  Was it positive or negative?  Negative  Was a consent obtained?  Yes    Subjective: Kim Solomon is a 47 year old  presents for IUD and desires Mirena type IUD.    Patient has been given the opportunity to ask questions about all forms of birth control, including all options appropriate for Kim Solomon. Discussed that no method of birth control, except abstinence is 100% effective against pregnancy or sexually transmitted infection.     Kim Solomon understands she may have the IUD removed at any time. IUD should be removed by a health care provider.    The entire insertion procedure was reviewed with the patient, including care after placement.    LMP 22   No allergy to betadine or shellfish  HCG Qual Urine   Date Value Ref Range Status   2022 Negative Negative Final     BP (!) 141/82   Pulse 73   Ht 1.676 m (5' 6\")   Wt 118.8 kg (262 lb)   BMI 42.29 kg/m      Pelvic Exam:   EG/BUS: normal genital architecture without lesions, erythema or abnormal secretions.   Vagina: moist, pink, rugae with physiologic discharge and secretions  Cervix: Nulliparous no lesions and pink, moist, closed, without lesion or CMT  Uterus: anteverted position, mobile, no pain  Adnexa: within normal limits and no masses, nodularity, tenderness    PROCEDURE NOTE:   -- IUD Insertion    Reason for Insertion: abnormal uterine bleeding    Not premedicated  Under sterile technique, cervix was visualized with speculum and prepped with Betadine solution swab x 3. Tenaculum was placed for stability. The uterus was gently straightened and sounded to 8.0 cm. IUD prepared for placement, and IUD inserted according to 's instructions without difficulty or significant resitance, and deployed at the fundus. The strings were visualized and trimmed to 3.0 cm " from the external os. Tenaculum was removed and hemostasis noted. Speculum removed.  Patient tolerated procedure well.    Lot # [id number]   Exp: Oct 2024    EBL: minimal      Complications: none    ASSESSMENT:     ICD-10-CM    1. Irregular bleeding  N92.6 hCG qual urine POCT   2. Adenomyosis  N80.0    3. Encounter for IUD insertion  Z30.430 hCG qual urine POCT   4. Metrorrhagia  N92.1 Surgical pathology exam [[id number]]     Endometrial Biopsy without Cervical Dilation [53649]   5. Menorrhagia with irregular cycle  N92.1 Surgical pathology exam [[id number]]     Endometrial Biopsy without Cervical Dilation [[id number]]      PLAN:    Given 's handouts, including when to have IUD removed, list of danger s/sx, side effects and follow up recommended. Encouraged condom use for prevention of STD. Back up contraception advised for 7 days if progestin method. Advised to call for any fever, for prolonged or severe pain or bleeding, abnormal vaginal discharge, or unable to palpate strings. She was advised to use pain medications (ibuprofen) as needed for mild to moderate pain. Advised to follow-up in clinic in 4-6 weeks for IUD string check if unable to find strings or as directed by provider.     Dr. Zaragoza was present for entirety of procedure    Denny Lara DO, MS  OBGYN Resident, PGY2  [facility]  08/17/2022 4:47 PM    I was present for and supervised the endometrial biopsy and IUD placement.   Anitha Zaragoza MD

## 2022-08-17 NOTE — PROGRESS NOTES
"[facility]  [facility]  GYNECOLOGY CLINIC NOTE    S: Kim Solomon is a 47 year old   here today for possible IUD placement and abnormal vaginal bleeding. Patient seen by CNGLENDY in our clinic 22 for evaluation of irregular bleeding. Again notes today that over the last 4 years she has been having periods of irregular bleeding in addition to irregular timing of menses. Most recently reports that she has not had a menses since may 2022. No spotting. Is unsure of this is due to her stress (works as a ), or possible due to start of menopause. Denies any abdominal pain, bloating, bowel or bladder complaints.     She was sent for Pelvic US on 22 that as notable for possible adenomyosis.     Today patient additionally reports a strong family history of breast and endometrial cancer (in both mother and maternal grandmother). Is interested in EMB today for evaluation as well. Denies any fever, chills, weight loss or weight gain.    Gyn Hx:   No LMP recorded.  Menses:   Now irregular. No menses since 2022  Contraception:   none  Sexual Activity  yes, single partner,  Sexually transmitted disease history:  none  PAP smear history:  PAP   Date Value Ref Range Status   2019 NIL  Final     Last PAP was normal; 2019: NILM, HPV negative         O:   BP (!) 141/82   Pulse 73   Ht 1.676 m (5' 6\")   Wt 118.8 kg (262 lb)   BMI 42.29 kg/m    Pelvic exam: normal vagina and vulva, normal cervix without lesions or tenderness, uterus normal size anteverted, adenxa normal in size without tenderness    A/P:  Kim Solomon is a 47 year old y/o . here today for abnormal vaginal bleeding.     Discussed with patient at length causes of abnormal uterine bleeding, and adenomyosis seen on recent pelvic US. She would like to move forward with EMB for confirmatory diagnosis, and placement of IUD. Discussed available IUD " options and recommendation for Mirena IUD placement. She is amendable to this plan. Discussed risks/benefits/alternatives of EMB and IUD placement. All questions were answered and addressed. EMB and Mirena IUD insertion performed. Please refer to individual procedure notes for additional information on procedures. Both procedures were uncomplicated and patient tolerated well.     Patient has my chart and will receive messages about pathology results from EMB through there.     Dr. Zaragoza was present during entire procedural portions of visit.    Denny Lara DO, MS  OBGYN Resident, PGY2  [facility]  08/17/2022 4:52 PM    I have seen and examined the patient with the resident. I have reviewed, edited, and agree with the note.   I was present for and supervised the endometrial biopsy and IUD placement.  Anitha Zaragoza MD

## 2022-08-19 ENCOUNTER — MYC REFILL (OUTPATIENT)
Dept: INTERNAL MEDICINE | Facility: CLINIC | Age: 47
End: 2022-08-19

## 2022-08-19 DIAGNOSIS — F98.8 ATTENTION DEFICIT DISORDER (ADD) WITHOUT HYPERACTIVITY: ICD-10-CM

## 2022-08-19 RX ORDER — LISDEXAMFETAMINE DIMESYLATE 30 MG/1
30 CAPSULE ORAL EVERY MORNING
Qty: 30 CAPSULE | Refills: 0 | Status: CANCELLED | OUTPATIENT
Start: 2022-08-19

## 2022-08-20 LAB
PATH REPORT.COMMENTS IMP SPEC: NORMAL
PATH REPORT.COMMENTS IMP SPEC: NORMAL
PATH REPORT.FINAL DX SPEC: NORMAL
PATH REPORT.GROSS SPEC: NORMAL
PATH REPORT.MICROSCOPIC SPEC OTHER STN: NORMAL
PATH REPORT.RELEVANT HX SPEC: NORMAL
PHOTO IMAGE: NORMAL

## 2022-08-22 RX ORDER — LISDEXAMFETAMINE DIMESYLATE 30 MG/1
30 CAPSULE ORAL DAILY
Qty: 30 CAPSULE | Refills: 0 | Status: SHIPPED | OUTPATIENT
Start: 2022-09-22 | End: 2022-10-29

## 2022-08-22 RX ORDER — LISDEXAMFETAMINE DIMESYLATE 30 MG/1
30 CAPSULE ORAL DAILY
Qty: 30 CAPSULE | Refills: 0 | Status: SHIPPED | OUTPATIENT
Start: 2022-10-23 | End: 2022-11-22

## 2022-08-22 RX ORDER — LISDEXAMFETAMINE DIMESYLATE 30 MG/1
30 CAPSULE ORAL DAILY
Qty: 30 CAPSULE | Refills: 0 | Status: SHIPPED | OUTPATIENT
Start: 2022-08-22 | End: 2022-09-21

## 2022-09-26 ENCOUNTER — MYC REFILL (OUTPATIENT)
Dept: INTERNAL MEDICINE | Facility: CLINIC | Age: 47
End: 2022-09-26

## 2022-09-26 DIAGNOSIS — F98.8 ATTENTION DEFICIT DISORDER (ADD) WITHOUT HYPERACTIVITY: ICD-10-CM

## 2022-09-26 RX ORDER — LISDEXAMFETAMINE DIMESYLATE 30 MG/1
30 CAPSULE ORAL DAILY
Qty: 30 CAPSULE | Refills: 0 | Status: CANCELLED | OUTPATIENT
Start: 2022-09-26

## 2022-09-27 ENCOUNTER — VIRTUAL VISIT (OUTPATIENT)
Dept: ONCOLOGY | Facility: CLINIC | Age: 47
End: 2022-09-27
Attending: INTERNAL MEDICINE
Payer: COMMERCIAL

## 2022-09-27 DIAGNOSIS — Z80.49 FAMILY HISTORY OF UTERINE CANCER: ICD-10-CM

## 2022-09-27 DIAGNOSIS — Z80.3 FAMILY HISTORY OF MALIGNANT NEOPLASM OF BREAST: Primary | ICD-10-CM

## 2022-09-27 PROCEDURE — 96040 HC GENETIC COUNSELING, EACH 30 MINUTES: CPT | Mod: GT,95 | Performed by: GENETIC COUNSELOR, MS

## 2022-09-27 NOTE — TELEPHONE ENCOUNTER
lisdexamfetamine (VYVANSE) 30 MG capsule    Called the Pharmacy on 9/27/2022  Medication will be filled today 9/27/2022 and Pt will be notified when medication is ready for .         Misti Lockhart RN  Central Triage Red Flags/Med Refills      lisdexamfetamine (VYVANSE) 30 MG capsule 30 capsule 0 10/23/2022 11/22/2022 --   Sig - Route: Take 1 capsule (30 mg) by mouth daily for 30 days - Oral   Sent to pharmacy as: Lisdexamfetamine Dimesylate 30 MG Oral Capsule (Vyvanse)   Class: E-Prescribe   Earliest Fill Date: 10/20/2022   Order: [id number]   E-Prescribing Status: Receipt confirmed by pharmacy (8/22/2022  1:57 PM CDT)       Other Panel Orders      Outpatient Medications     Disp Refills Start End CHANEL   lisdexamfetamine (VYVANSE) 30 MG capsule 30 capsule 0 8/22/2022 9/21/2022 --   Sig - Route: Take 1 capsule (30 mg) by mouth daily for 30 days - Oral   Sent to pharmacy as: Lisdexamfetamine Dimesylate 30 MG Oral Capsule (Vyvanse)   Class: E-Prescribe   Earliest Fill Date: 8/22/2022   Order: [id number]   E-Prescribing Status: Receipt confirmed by pharmacy (8/22/2022  1:57 PM CDT)   lisdexamfetamine (VYVANSE) 30 MG capsule 30 capsule 0 9/22/2022 10/22/2022 --   Sig - Route: Take 1 capsule (30 mg) by mouth daily for 30 days - Oral   Sent to pharmacy as: Lisdexamfetamine Dimesylate 30 MG Oral Capsule (Vyvanse)   Class: E-Prescribe   Earliest Fill Date: 9/19/2022   Order: [id number]   E-Prescribing Status: Receipt confirmed by pharmacy (8/22/2022  1:57 PM CDT)       Printout Tracking    Pharmacy    [facility] [id number] [place] [address]

## 2022-09-27 NOTE — LETTER
"    9/27/2022         RE: Kim Solomon  [address] [place] [address]        Dear Colleague,    Thank you for referring your patient, Kim Solomon, to the [facility]. Please see a copy of my visit note below.    9/27/2022    Kim is a 47 year old who is being evaluated via a billable video visit. Pt is in [place]    How would you like to obtain your AVS? MyChart  If the video visit is dropped, the invitation should be resent by: Send to e-mail at: [email or web address]  Will anyone else be joining your video visit? No    Video-Visit Details  Video Start Time: 12:00pm  Type of service:  Video Visit  Video End Time:12:53pm  Originating Location (pt. Location): Home  Distant Location (provider location):  [facility]   Platform used for Video Visit: Marty ARANA    Referring Provider: ELVIA Armstrong CNP    Presenting Information:   Given concerns regarding the potential for COVID-19 exposure during a clinic visit, Kim elected for a video genetic counseling visit through the Cancer Risk Management Program to discuss her family history of breast and uterine cancer. We reviewed this history, cancer screening recommendations, and available genetic testing options.    Personal History:  Kim is a 47 year old female. She does not have any personal history of cancer. Of note, she reports that she had a growth removed from the sclera of her right eye at age 9; she was unsure if the type of growth had a name, but recalls that the \"cells looked like a mole\".    She had her first menstrual period at age 15, does not have biological children, and is premenopausal. Kim has her ovaries, fallopian tubes and uterus in place. She had a transvaginal ultrasound in July 2022 to address irregular bleeding that identified possible adenomyosis; the follow-up biopsy was benign. She reports that she has never used hormone replacement therapy.      Her " baseline mammogram in July 2022 was normal. Kim has not had a colonoscopy and aside from dermatologic exams as needed, she does not regularly do any other cancer screening at this time.    Family History: (Please see scanned pedigree for detailed family history information)    Kim's mother was diagnosed with endometrial cancer at age 77, breast cancer at age 81, and passed away at age 81. She did not pursue genetic testing.    Kim's maternal grandmother was diagnosed with breast cancer at age 65 and passed away at age 85.    Kim reports that she has no other known family history of cancer.    Her maternal ethnicity is English, Green[place]ic, and Cook Islander. Her paternal ethnicity is English and [place]ic. There is no known Ashkenazi Adventist ancestry on either side of her family.    Discussion:    We reviewed the features of sporadic, familial, and hereditary cancers. In looking at Kim's maternal family history, it is possible that a cancer susceptibility gene is present as her mother and maternal grandmother were diagnosed with related cancers; her mother was also diagnosed with two primary cancer. That being said, they were both diagnosed with more common cancers at typical ages and Kim does not otherwise have a significant family history of cancer. It should also be noted, though, that Kim does not have any siblings and her mother only had one brother; this may cause an underestimation of the chance for a hereditary breast/gynecologic cancers syndrome in her family.    We first discussed cancer screening, based on her current family history of cancer.    Based on her personal and family history, Kim has a 24.1% lifetime risk of developing breast cancer based on the JORDI 8 model. As such, Kim meets current National Comprehensive Cancer Network (NCCN) guidelines for high risk breast screening. This includes annual breast MRI in addition to annual mammogram beginning at her current age. In addition, Kim should  be receiving clinical breast exams by her physician. We discussed that Kim could participate in our Cancer Risk Management Program in which our nursing specialist provides an individual screening plan and assists with medical management.    Due to Kim's family history of uterine cancer, Kim remains at slightly increased risk for uterine cancer. We discussed available uterine cancer screening (pelvic exams, endometrial sampling, transvaginal ultrasounds) as well as the significant limitations of this screening. As such, this screening is not typically recommended. That being said, women in this family should discuss their family history, this screening, and the signs and symptoms of uterine cancer with their primary OB/GYN provider, as they may have individualized recommendations.    We then discussed the natural history and genetics of hereditary breast and gynecologic cancer.     We reviewed that the most common cause of hereditary breast cancer is HBOC syndrome, which is caused by mutations in the BRCA1 and BRCA2 genes. Individuals with HBOC syndrome are at increased risk for several different cancers, including breast, ovarian, male breast, prostate, melanoma, pancreatic, and possibly uterine cancer.    We discussed that there are additional genes that could cause increased risk for breast and/or gynecologic cancers. As many of these genes present with overlapping features in a family and accurate cancer risk cannot always be established based upon the pedigree analysis alone, it is often reasonable to consider panel genetic testing to analyze multiple genes at once.    A detailed handout regarding these genes/syndromes and the information we discussed was provided to Kim at the end of our appointment today and can be found in the after visit summary. Topics included: inheritance pattern, cancer risks, cancer screening recommendations, and also risks, benefits and limitations of testing.    We discussed  that genetic testing for  cancer susceptibility genes is typically most informative, though, when it is first performed on a family member with a personal history of cancer. Testing is available to Kim, but with limitations. If Kim pursues testing at this time and receives a negative result, this does not rule out the possibility of a hereditary cancer syndrome in her and/or her family.    We also discussed that at this time, Kim does not meet current NCCN guidelines for genetic testing of the high penetrance breast cancer genes. Testing would still be available to Kim, though, and we reviewed the patient-pay billing option at [facility] if insurance coverage is uncertain.    Kim verbalized understanding and shared that she would still be interested in genetic testing that addresses the most common cancer risks. As such, we discussed the [facility] Common Hereditary Cancers Panel.    Genetic testing is available for 47 genes associated with cancers of the breast, ovary, uterus, prostate and gastrointestinal system: [facility] Common Hereditary Cancers Panel (APC, MADHU, AXIN2, BARD1, BMPR1A, BRCA1, BRCA2, BRIP1, CDH1, CDK4, CDKN2A, CHEK2, CTNNA1, DICER1, EPCAM, GREM1, HOXB13, KIT, MEN1, MLH1, MSH2, MSH3, MSH6, MUTYH, NBN, NF1, NTHL1, PALB2, PDGFRA, PMS2, POLD1, POLE, PTEN,RAD50, RAD51C, RAD51D, SDHA, SDHB, SDHC, SDHD, SMAD4, SMARCA4, STK11, TP53,TSC1, TSC2, VHL).      We discussed that many of these genes are associated with specific hereditary cancer syndromes and published management guidelines: Hereditary Breast and Ovarian Cancer syndrome (BRCA1, BRCA2), Booker syndrome (MLH1, MSH2, MSH6, PMS2, EPCAM), Familial Adenomatous Polyposis (APC), Hereditary Diffuse Gastric Cancer (CDH1), Familial Atypical Multiple Mole Melanoma syndrome (CDK4, CDKN2A), Multiple Endocrine Neoplasia type 1 (MEN1), Juvenile Polyposis syndrome (BMPR1A, SMAD4), Cowden syndrome (PTEN), Li Fraumeni syndrome (TP53), Hereditary Paraganglioma and  Pheochromocytoma syndrome (SDHA, SDHB, SDHC, SDHD), Peutz-Jeghers syndrome (STK11), MUTYH Associated Polyposis (MUTYH), Tuberous sclerosis complex (TSC1, TSC2), Von Hippel-Lindau disease (VHL), and Neurofibromatosis type 1 (NF1).     The MADHU, AXIN2, BARD1, BRIP1, CHEK2, GREM1, MSH3, NTHL1, PALB2, POLD1, POLE, RAD51C, and RAD51D genes are associated with increased cancer risk and have published management guidelines for certain cancers.     The remaining genes (CTNNA1, DICER1, HOXB13, KIT, NBN, PDGFRA, RAD50, and SMARCA4) are associated with increased cancer risk and may allow us to make medical recommendations when mutations are identified.     Consent was obtained over the video and Kim elected to schedule a lab appointment at the [facility] at her earliest convenience. Once her blood is drawn, genetic testing using the Common Hereditary Cancers Panel will be sent to [facility]. Turn around time: 2-3 weeks after [facility] receives her blood sample.    Medical Management: For Kim, we reviewed that the information from genetic testing may determine:    additional cancer screening for which Kim may qualify (i.e. mammogram and breast MRI, more frequent colonoscopies, more frequent dermatologic exams, etc.),    options for risk reducing surgeries Kim could consider (i.e. bilateral mastectomy, surgery to remove her ovaries and/or uterus, etc.),      and targeted chemotherapies if she were to develop certain cancers in the future (i.e. immunotherapy for individuals with Booker syndrome, PARP inhibitors, etc.).     These recommendations and possible targeted chemotherapies will be discussed in detail once genetic testing is completed.     Plan:  1) Today Kim elected to proceed with genetic testing using the [facility] Common Hereditary Cancers Panel, offered by [facility]. Kim will schedule a lab appointment at her earliest convenience.  2) The results should be  available 2-3 weeks after [facility] receives her blood sample.  3) Kim will be contacted to schedule a virtual visit to discuss the results.        Again, thank you for allowing me to participate in the care of your patient.      Sincerely,    Akosua Nails, GC

## 2022-09-27 NOTE — PROGRESS NOTES
"9/27/2022    Kim is a 47 year old who is being evaluated via a billable video visit. Pt is in [place]    How would you like to obtain your AVS? MyChart  If the video visit is dropped, the invitation should be resent by: Send to e-mail at: [email or web address]  Will anyone else be joining your video visit? No    Video-Visit Details  Video Start Time: 12:00pm  Type of service:  Video Visit  Video End Time:12:53pm  Originating Location (pt. Location): Home  Distant Location (provider location):  [facility]   Platform used for Video Visit: Marty ARANA    Referring Provider: ELVIA Armstrong CNP    Presenting Information:   Given concerns regarding the potential for COVID-19 exposure during a clinic visit, Kim elected for a video genetic counseling visit through the Cancer Risk Management Program to discuss her family history of breast and uterine cancer. We reviewed this history, cancer screening recommendations, and available genetic testing options.    Personal History:  Kim is a 47 year old female. She does not have any personal history of cancer. Of note, she reports that she had a growth removed from the sclera of her right eye at age 9; she was unsure if the type of growth had a name, but recalls that the \"cells looked like a mole\".    She had her first menstrual period at age 15, does not have biological children, and is premenopausal. Kim has her ovaries, fallopian tubes and uterus in place. She had a transvaginal ultrasound in July 2022 to address irregular bleeding that identified possible adenomyosis; the follow-up biopsy was benign. She reports that she has never used hormone replacement therapy.      Her baseline mammogram in July 2022 was normal. Kim has not had a colonoscopy and aside from dermatologic exams as needed, she does not regularly do any other cancer screening at this time.    Family History: (Please see scanned pedigree for detailed family " history information)    Kim's mother was diagnosed with endometrial cancer at age 77, breast cancer at age 81, and passed away at age 81. She did not pursue genetic testing.    Kim's maternal grandmother was diagnosed with breast cancer at age 65 and passed away at age 85.    Kim reports that she has no other known family history of cancer.    Her maternal ethnicity is English, Bhutanese, and Serbian. Her paternal ethnicity is English and Bhutanese. There is no known Ashkenazi Caodaism ancestry on either side of her family.    Discussion:    We reviewed the features of sporadic, familial, and hereditary cancers. In looking at Kim's maternal family history, it is possible that a cancer susceptibility gene is present as her mother and maternal grandmother were diagnosed with related cancers; her mother was also diagnosed with two primary cancer. That being said, they were both diagnosed with more common cancers at typical ages and Kim does not otherwise have a significant family history of cancer. It should also be noted, though, that Kim does not have any siblings and her mother only had one brother; this may cause an underestimation of the chance for a hereditary breast/gynecologic cancers syndrome in her family.    We first discussed cancer screening, based on her current family history of cancer.    Based on her personal and family history, Kim has a 24.1% lifetime risk of developing breast cancer based on the JORDI 8 model. As such, Kim meets current National Comprehensive Cancer Network (NCCN) guidelines for high risk breast screening. This includes annual breast MRI in addition to annual mammogram beginning at her current age. In addition, Kim should be receiving clinical breast exams by her physician. We discussed that Kim could participate in our Cancer Risk Management Program in which our nursing specialist provides an individual screening plan and assists with medical management.    Due to  Kim's family history of uterine cancer, Kim remains at slightly increased risk for uterine cancer. We discussed available uterine cancer screening (pelvic exams, endometrial sampling, transvaginal ultrasounds) as well as the significant limitations of this screening. As such, this screening is not typically recommended. That being said, women in this family should discuss their family history, this screening, and the signs and symptoms of uterine cancer with their primary OB/GYN provider, as they may have individualized recommendations.    We then discussed the natural history and genetics of hereditary breast and gynecologic cancer.     We reviewed that the most common cause of hereditary breast cancer is HBOC syndrome, which is caused by mutations in the BRCA1 and BRCA2 genes. Individuals with HBOC syndrome are at increased risk for several different cancers, including breast, ovarian, male breast, prostate, melanoma, pancreatic, and possibly uterine cancer.    We discussed that there are additional genes that could cause increased risk for breast and/or gynecologic cancers. As many of these genes present with overlapping features in a family and accurate cancer risk cannot always be established based upon the pedigree analysis alone, it is often reasonable to consider panel genetic testing to analyze multiple genes at once.    A detailed handout regarding these genes/syndromes and the information we discussed was provided to Kim at the end of our appointment today and can be found in the after visit summary. Topics included: inheritance pattern, cancer risks, cancer screening recommendations, and also risks, benefits and limitations of testing.    We discussed that genetic testing for  cancer susceptibility genes is typically most informative, though, when it is first performed on a family member with a personal history of cancer. Testing is available to Kim, but with limitations. If Kim pursues testing  at this time and receives a negative result, this does not rule out the possibility of a hereditary cancer syndrome in her and/or her family.    We also discussed that at this time, Kim does not meet current NCCN guidelines for genetic testing of the high penetrance breast cancer genes. Testing would still be available to Kim, though, and we reviewed the patient-pay billing option at [facility] if insurance coverage is uncertain.    Kim verbalized understanding and shared that she would still be interested in genetic testing that addresses the most common cancer risks. As such, we discussed the [facility] Common Hereditary Cancers Panel.    Genetic testing is available for 47 genes associated with cancers of the breast, ovary, uterus, prostate and gastrointestinal system: [facility] Common Hereditary Cancers Panel (APC, MADHU, AXIN2, BARD1, BMPR1A, BRCA1, BRCA2, BRIP1, CDH1, CDK4, CDKN2A, CHEK2, CTNNA1, DICER1, EPCAM, GREM1, HOXB13, KIT, MEN1, MLH1, MSH2, MSH3, MSH6, MUTYH, NBN, NF1, NTHL1, PALB2, PDGFRA, PMS2, POLD1, POLE, PTEN,RAD50, RAD51C, RAD51D, SDHA, SDHB, SDHC, SDHD, SMAD4, SMARCA4, STK11, TP53,TSC1, TSC2, VHL).      We discussed that many of these genes are associated with specific hereditary cancer syndromes and published management guidelines: Hereditary Breast and Ovarian Cancer syndrome (BRCA1, BRCA2), Booker syndrome (MLH1, MSH2, MSH6, PMS2, EPCAM), Familial Adenomatous Polyposis (APC), Hereditary Diffuse Gastric Cancer (CDH1), Familial Atypical Multiple Mole Melanoma syndrome (CDK4, CDKN2A), Multiple Endocrine Neoplasia type 1 (MEN1), Juvenile Polyposis syndrome (BMPR1A, SMAD4), Cowden syndrome (PTEN), Li Fraumeni syndrome (TP53), Hereditary Paraganglioma and Pheochromocytoma syndrome (SDHA, SDHB, SDHC, SDHD), Peutz-Jeghers syndrome (STK11), MUTYH Associated Polyposis (MUTYH), Tuberous sclerosis complex (TSC1, TSC2), Von Hippel-Lindau disease (VHL), and Neurofibromatosis type 1 (NF1).     The MADHU, AXIN2,  BARD1, BRIP1, CHEK2, GREM1, MSH3, NTHL1, PALB2, POLD1, POLE, RAD51C, and RAD51D genes are associated with increased cancer risk and have published management guidelines for certain cancers.     The remaining genes (CTNNA1, DICER1, HOXB13, KIT, NBN, PDGFRA, RAD50, and SMARCA4) are associated with increased cancer risk and may allow us to make medical recommendations when mutations are identified.     Consent was obtained over the video and Kim elected to schedule a lab appointment at the [facility] at her earliest convenience. Once her blood is drawn, genetic testing using the Common Hereditary Cancers Panel will be sent to [facility]. Turn around time: 2-3 weeks after [facility] receives her blood sample.    Medical Management: For Kim, we reviewed that the information from genetic testing may determine:    additional cancer screening for which Kim may qualify (i.e. mammogram and breast MRI, more frequent colonoscopies, more frequent dermatologic exams, etc.),    options for risk reducing surgeries Kim could consider (i.e. bilateral mastectomy, surgery to remove her ovaries and/or uterus, etc.),      and targeted chemotherapies if she were to develop certain cancers in the future (i.e. immunotherapy for individuals with Booker syndrome, PARP inhibitors, etc.).     These recommendations and possible targeted chemotherapies will be discussed in detail once genetic testing is completed.     Plan:  1) Today Kim elected to proceed with genetic testing using the [facility] Common Hereditary Cancers Panel, offered by [facility]. Kim will schedule a lab appointment at her earliest convenience.  2) The results should be available 2-3 weeks after [facility] receives her blood sample.  3) Kim will be contacted to schedule a virtual visit to discuss the results.    Akosua Nails MS, [facility] [place]ensed, Certified Genetic Counselor  Office: [phone]  Pager: [phone]

## 2022-10-03 NOTE — PATIENT INSTRUCTIONS
Assessing Cancer Risk  Only about 5-10% of cancers are thought to be due to an inherited cancer susceptibility gene.    These families often have:  Several people with the same or related types of cancer  Cancers diagnosed at a young age (before age 50)  Individuals with more than one primary cancer  Multiple generations of the family affected with cancer    Some people may be candidates for genetic testing of more than one gene.  For these families, genetic testing using a cancer panel may be offered.  These panels will test different genes known to increase the risk for breast, ovarian, uterine, and/or other cancers. All of the genes discussed below have published clinical management guidelines for individuals who are found to carry a mutation. The purpose of this handout is to serve as a brief summary of the genes analyzed by the panels used to inquire about hereditary breast and gynecologic cancer:  MADHU, BRCA1, BRCA2, BRIP1, CDH1, CHEK2, MLH1, MSH2, MSH6, PMS2, EPCAM, PTEN, PALB2, RAD51C, RAD51D, and TP53.  ______________________________________________________________________________  Hereditary Breast and Ovarian Cancer Syndrome   (BRCA1 and BRCA2)  A single mutation in one of the copies of BRCA1 or BRCA2 increases the risk for breast and ovarian cancer, among others.  The risk for pancreatic cancer and melanoma may also be slightly increased in some families.  The chart below shows the chance that someone with a BRCA mutation would develop cancer in his or her lifetime1,2,3,4.        A person s ethnic background is also important to consider, as individuals of Ashkenazi [employer] ancestry have a higher chance of having a BRCA gene mutation.  There are three BRCA mutations that occur more frequently in this population.    Booker Syndrome   (MLH1, MSH2, MSH6, PMS2, and EPCAM)  Currently five genes are known to cause Booker Syndrome: MLH1, MSH2, MSH6, PMS2, and EPCAM.  A single mutation in one of the Booker  Syndrome genes increases the risk for colon, endometrial, ovarian, and stomach cancers.  Other cancers that occur less commonly in Booker Syndrome include urinary tract, skin, and brain cancers.  The chart below shows the chance that a person with Booker syndrome would develop cancer in his or her lifetime5.      *Cancer risk varies depending on Booker syndrome gene found    Cowden Syndrome   (PTEN)  Cowden syndrome is a hereditary condition that increases the risk for breast, thyroid, endometrial, colon, and kidney cancer.  Cowden syndrome is caused by a mutation in the PTEN gene.  A single mutation in one of the copies of PTEN causes Cowden syndrome and increases cancer risk.  The chart below shows the chance that someone with a PTEN mutation would develop cancer in their lifetime6,7.  Other benign features seen in some individuals with Cowden syndrome include benign skin lesions (facial papules, keratoses, lipomas), learning disability, autism, thyroid nodules, colon polyps, and larger head size.      *One recent study found breast cancer risk to be increased to 85%    Li-Fraumeni Syndrome   (TP53)  Li-Fraumeni Syndrome (LFS) is a cancer predisposition syndrome caused by a mutation in the TP53 gene. A single mutation in one of the copies of TP53 increases the risk for multiple cancers. Individuals with LFS are at an increased risk for developing cancer at a young age. The lifetime risk for development of a LFS-associated cancer is 50% by age 30 and 90% by age 60.   Core Cancers: Sarcomas, Breast, Brain, Lung, Leukemias/Lymphomas, Adrenocortical carcinomas  Other Cancers: Gastrointestinal, Thyroid, Skin, Genitourinary    Hereditary Diffuse Gastric Cancer   (CDH1)  Currently, one gene is known to cause hereditary diffuse gastric cancer (HDGC): CDH1.  Individuals with HDGC are at increased risk for diffuse gastric cancer and lobular breast cancer. Of people diagnosed with HDGC, 30-50% have a mutation in the CDH1 gene.   This suggests there are likely other genes that may cause HDGC that have not been identified yet.      Lifetime Cancer Risks    General Population HDGC    Diffuse Gastric  <1% ~80%   Breast 12% 39-52%         Additional Genes  MADHU  MADHU is a moderate-risk breast cancer gene. Women who have a mutation in MADHU can have between a 2-4 fold increased risk for breast cancer compared to the general population8. MADHU mutations have also been associated with increased risk for pancreatic cancer, however an estimate of this cancer risk is not well understood9. Individuals who inherit two MADHU mutations have a condition called ataxia-telangiectasia (AT).  This rare autosomal recessive condition affects the nervous system and immune system, and is associated with progressive cerebellar ataxia beginning in childhood.  Individuals with ataxia-telangiectasia often have a weakened immune system and have an increased risk for childhood cancers.    PALB2  Mutations in PALB2 have been shown to increase the risk of breast cancer up to 33-58% in some families; where individuals fall within this risk range is dependent upon family [id number]. PALB2 mutations have also been associated with increased risk for pancreatic cancer, although this risk has not been quantified yet.  Individuals who inherit two PALB2 mutations--one from their mother and one from their father--have a condition called Fanconi Anemia.  This rare autosomal recessive condition is associated with short stature, developmental delay, bone marrow failure, and increased risk for childhood cancers.    CHEK2   CHEK2 is a moderate-risk breast cancer gene.  Women who have a mutation in CHEK2 have around a 2-fold increased risk for breast cancer compared to the general population, and this risk may be higher depending upon family history.11,12,13 Mutations in CHEK2 have also been shown to increase the risk of a number of other cancers, including colon and prostate, however these  cancer risks are currently not well understood.    BRIP1, RAD51C and RAD51D  Mutations in BRIP1, RAD51C, and RAD51D have been shown to increase the risk of ovarian cancer and possibly female breast cancer as well14,15 .       Lifetime Cancer Risk    General Population BRIP1 RAD51C RAD51D   Ovarian 1-2% ~5-8% ~5-9% ~7-15%           Inheritance  All of the cancer syndromes reviewed above are inherited in an autosomal dominant pattern.  This means that if a parent has a mutation, each of his or her children will have a 50% chance of inheriting that same mutation.  Therefore, each child--male or female--would have a 50% chance of being at increased risk for developing cancer.      Image obtained from Genetics Home Reference, 2013     Mutations in some genes can occur de janet, which means that a person s mutation occurred for the first time in them and was not inherited from a parent.  Now that they have the mutation, however, it can be passed on to future generations.    Genetic Testing  Genetic testing involves a blood test and will look at the genetic information in the MADHU, BRCA1, BRCA2, BRIP1, CDH1, CHEK2, MLH1, MSH2, MSH6, PMS2, EPCAM, PTEN, PALB2, RAD51C, RAD51D, and TP53 genes for any harmful mutations that are associated with increased cancer risk.  If possible, it is recommended that the person(s) who has had cancer be tested before other family members.  That person will give us the most useful information about whether or not a specific gene is associated with the cancer in the family.    Results  There are three possible results of genetic testing:  Positive--a harmful mutation was identified in one or more of the genes  Negative--no mutation was identified in any of the genes on this panel  Variant of unknown significance--a variation in one of the genes was identified, but it is unclear how this impacts cancer risk in the family    Advantages and Disadvantages   There are advantages and disadvantages to  genetic testing.    Advantages  May clarify your cancer risk  Can help you make medical decisions  May explain the cancers in your family  May give useful information to your family members (if you share your results)    Disadvantages  Possible negative emotional impact of learning about inherited cancer risk  Uncertainty in interpreting a negative test result in some situations  Possible genetic discrimination concerns (see below)    Genetic Information Nondiscrimination Act (PEPITO)  PEPITO is a federal law that protects individuals from health insurance or employment discrimination based on a genetic test result alone.  Although rare, there are currently no legal discrimination protections in terms of life insurance, long term care, or disability insurances.  Visit the [employer] [place] website to learn more.    Reducing Cancer Risk  All of the genes described above have nationally recognized cancer screening guidelines that would be recommended for individuals who test positive.  In addition to increased cancer screening, surgeries may be offered or recommended to reduce cancer risk.  Recommendations are based upon an individual s genetic test result as well as their personal and family history of cancer.    Questions to Think About Regarding Genetic Testing:  What effect will the test result have on me and my relationship with my family members if I have an inherited gene mutation?  If I don t have a gene mutation?  Should I share my test results, and how will my family react to this news, which may also affect them?  Are my children ready to learn new information that may one day affect their own health?    Hereditary Cancer Resources    FORCE: Facing Our Risk of Cancer Empowered facingourrisk.org   Bright Pink bebrightpink.org   Li-Fraumeni Syndrome Association lfsassociation.org   PTEN World PTENworld.Faves   No stomach for cancer, Inc. nostomachforcancer.org   Stomach cancer relief network  Scrnet.org   Collaborative Group of the Americas on Inherited Colorectal Cancer (CGA) cgaicc.com    Cancer Care cancercare.org   American Cancer Society (ACS) cancer.org   National Cancer [place] (NCI) cancer.gov     Please call us if you have any questions or concerns.   Cancer Risk Management Program [phone]-CANCER ([phone])  Delfin Sanchez, MS [facility]  [phone]  Leona Niraj, MS, [facility] [phone]  Sia Dean, MS, [facility]  [phone]  Rina Gray, MS, [facility]  [phone]  Julianna Jas, MS, [facility]  [phone]  Akosua Nails, MS, [facility] [phone]  Brina Garay, MS, [facility] [phone]    References  Real Hahn PDP, Veronica S, Marcia AUGUST, Charles JE, Tammy JL, Leonor N, Jeromy H, Nancy O, Eduardo A, Alexx B, Cali P, Mandanyell S, Bettye DM, Leeroy N, Mallorie E, Alanna H, Nguyen E, Lubinski J, Gronwald J, Melinda B, Joel H, Thorlacius S, Eerola H, Benoit H, Ezequiel K, Thierry OP. Average risks of breast and ovarian cancer associated with BRCA1 or BRCA2 mutations detected in case series unselected for family history: a combined analysis of 222 studies. Am J Hum Maria Elena. 2003;72:1117-30.  Bill N, Elicia M, Monica G.  BRCA1 and BRCA2 Hereditary Breast and Ovarian Cancer. Gene Reviews online. 2013.  Gage YC, Ted S, Frida G, Fragoso S. Breast cancer risk among male BRCA1 and BRCA2 mutation carriers. J Natl Cancer Inst. 2007;99:1811-4.  David RED, Prakash I, Rubio J, Jose Angel E, Navya ER, Khalif F. Risk of breast cancer in male BRCA2 carriers. J Med Maria Elena. 2010;47:710-1.  National Comprehensive Cancer Network. Clinical practice guidelines in oncology, colorectal cancer screening. Available online (registration required). 2015.  Venkatesh CARDENAS, Vickie J, Macario J, Catracho LA, Neymar MS, Eng C. Lifetime cancer risks in individuals with germline PTEN mutations. Clin Cancer Res. 2012;18:400-7.  Andre NIX. Cowden Syndrome: A Critical Review of the Clinical Literature. J Maria Elena .  2009:18:13-27.  Belle A, Noel D, Smita S, Josephine P, Jasbir T, Darrius M, Ghulam B, Abril H, Justin R, Jordan K, Ponce L, David DG, Bettye D, Slava DF, Joe MR, The Breast Cancer Susceptibility Collaboration () & Pawel TORIBIO. MADHU mutations that cause ataxia-telangiectasia are breast cancer susceptibility alleles. Nature Genetics. 2006;38:873-875  Frank N , German Y, Yomaira J, Oskar L, Dai GM , Shira ML, Gallinger S, Montalvo AG, Syngal S, Ana ML, Patrick J , Mary Ellen R, Shaka SZ, Christian JR, Chaparro VE, Eulalio M, Voalex B, Jayson N, Hermelindo RH, Freddy KW, and Priscilla AP. MADHU mutations in patients with hereditary pancreatic cancer. Cancer Discover. 2012;2:41-46  Ghada VANEGAS., et al. Breast-Cancer Risk in Families with Mutations in PALB2. NEJM. 2014; 371(6):497-506.  CHEK2 Breast Cancer Case-Control Consortium. CHEK2*1100delC and susceptibility to breast cancer: A collaborative analysis involving 10,860 breast cancer cases and 9,065 controls from 10 studies. Am J Hum Maria Elena, 74 (2004), pp. 5876-8448  Kuldip T, Emelyn S, Sadi K, et al. Spectrum of Mutations in BRCA1, BRCA2, CHEK2, and TP53 in Families at High Risk of Breast Cancer. LIZABETH. 2006;295(12):4579-0091.   Samir C, Starr D, Xavier A, et al. Risk of breast cancer in women with a CHEK2 mutation with and without a family history of breast cancer. J Clin Oncol. 2011;29:3892-0609.  Sekou H, Brianna E, Lenka SJ, et al. Contribution of germline mutations in the RAD51B, RAD51C, and RAD51D genes to ovarian cancer in the population. J Clin Oncol. 2015;33(26):3137-7653. Doi:10.1200/JCO.2015.61.2408.  Naina Travison DF, Fatoumata P, et al. Mutations in BRIP1 confer high risk of ovarian cancer. Vania Maria Elena. 2011;43(11):2295-2653. doi:10.1038/ng.955.

## 2022-10-13 ENCOUNTER — LAB (OUTPATIENT)
Dept: LAB | Facility: CLINIC | Age: 47
End: 2022-10-13
Payer: COMMERCIAL

## 2022-10-13 DIAGNOSIS — Z80.49 FAMILY HISTORY OF UTERINE CANCER: ICD-10-CM

## 2022-10-13 DIAGNOSIS — Z80.3 FAMILY HISTORY OF MALIGNANT NEOPLASM OF BREAST: ICD-10-CM

## 2022-10-13 PROCEDURE — 36415 COLL VENOUS BLD VENIPUNCTURE: CPT

## 2022-10-13 PROCEDURE — 99000 SPECIMEN HANDLING OFFICE-LAB: CPT

## 2022-10-24 LAB — SCANNED LAB RESULT: NORMAL

## 2022-10-29 ENCOUNTER — MYC REFILL (OUTPATIENT)
Dept: INTERNAL MEDICINE | Facility: CLINIC | Age: 47
End: 2022-10-29

## 2022-10-29 DIAGNOSIS — F98.8 ATTENTION DEFICIT DISORDER (ADD) WITHOUT HYPERACTIVITY: ICD-10-CM

## 2022-10-31 NOTE — TELEPHONE ENCOUNTER
lisdexamfetamine (VYVANSE) 30 MG capsule      Last Written Prescription Date:  10/23/22  Last Fill Quantity: 30,   # refills: 0  Last Office Visit : 8/3/22  Future Office visit:  12/15/22    Routing refill request to provider for review/approval because:  Drug not on the [facility], [facility] or [facility] refill protocol or controlled substance

## 2022-11-01 RX ORDER — LISDEXAMFETAMINE DIMESYLATE 30 MG/1
30 CAPSULE ORAL DAILY
Qty: 30 CAPSULE | Refills: 0 | Status: SHIPPED | OUTPATIENT
Start: 2022-11-01 | End: 2022-11-29

## 2022-11-07 ENCOUNTER — VIRTUAL VISIT (OUTPATIENT)
Dept: ONCOLOGY | Facility: CLINIC | Age: 47
End: 2022-11-07
Attending: GENETIC COUNSELOR, MS
Payer: COMMERCIAL

## 2022-11-07 DIAGNOSIS — Z80.49 FAMILY HISTORY OF UTERINE CANCER: ICD-10-CM

## 2022-11-07 DIAGNOSIS — Z80.3 FAMILY HISTORY OF MALIGNANT NEOPLASM OF BREAST: Primary | ICD-10-CM

## 2022-11-07 PROCEDURE — 999N000069 HC STATISTIC GENETIC COUNSELING, < 16 MIN: Mod: GT,95 | Performed by: GENETIC COUNSELOR, MS

## 2022-11-07 NOTE — LETTER
Cancer Risk Management  Program Locations    [facility]  [facility]  [facility]  [facility]  Mailing Address  Cancer Risk Management Program  [facility]  [address]  [place] [address]    New patient appointments  [phone]  November 11, 2022    Kim Solomon  [address] [place] [address]      Dear Kim,    It was a pleasure speaking with you recently regarding your genetic testing results. Here is a copy of the progress note summarizing our conversation. If you have any additional questions, please feel free to call.    11/7/2022    Kim is a 47 year old who is being evaluated via a billable video visit.      How would you like to obtain your AVS? MyChart  If the video visit is dropped, the invitation should be resent by: Text to cell phone: [phone]  Will anyone else be joining your video visit? No    Video-Visit Details  Video Start Time: 8:00am  Type of service:  Video Visit  Video End Time:8:11am  Originating Location (pt. Location): Home  Distant Location (provider location):  Off-site  Platform used for Video Visit: [facility]     Referring Provider: ELVIA Armstrong CNP    Presenting Information:  I spoke to Kim by video today to discuss her genetic testing results. We last met on 9/27/2022 and her blood was drawn on 10/13/2022. The [facility] Common Hereditary Cancers Panel was ordered from [facility]. This testing was done because of Kim's family history of breast and uterine cancers.    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 No other variants or mutations were found in the POLD1 gene. Given the uncertain significance of this result,  "medical management decisions should NOT be made based on this test result alone.    Of note, Kim tested negative for variants and mutations in the following genes by sequencing and deletion/duplication analysis: APC, MADHU, AXIN2, BARD1, BMPR1A, BRCA1, BRCA2, BRIP1, CDH1, CDK4, CDKN2A, CHEK2, CTNNA1, DICER1, EPCAM (deletions/duplications only), GREM1 (deletions/duplications only), HOXB13, KIT, MEN1, MLH1, MSH2, MSH3, MSH6, MUTYH, NBN, NF1, NTHL1, PALB2, PDGFRA, PMS2, POLE, PTEN, RAD50, RAD51C, RAD51D, SDHA (sequencing only), SDHB, SDHC, SDHD, SMAD4, SMARCA4, STK11, TP53,TSC1, TSC2, VHL.     We reviewed the autosomal dominant inheritance of these genes.     If Kim has biological children, she cannot pass on a mutation in any of these genes to her children based on this test result. Mutations in these genes do not skip generations.      A copy of the test report can be found in the Laboratory tab, dated 10/13/2022, and named \"LABORATORY MISCELLANEOUS ORDER\". The report is scanned in as a linked document.    Interpretation:  We discussed several different interpretations of this inconclusive test result. It is not clear if this variant in the POLD1 gene is associated with increased cancer risk.  1. This variant may be a benign change that does not increase cancer risk.  2. This variant may be a harmful mutation that causes Polymerase Proofreading-Associated Polyposis and/or health conditions.    Genetic testing labs are working to collect evidence to determine if this variant is harmful or benign, and [facility] will contact me if it is reclassified. If this variant is determined to be a benign change, there may be a different gene or combination of genes and environment that are associated with the cancers in this family that are not identifiable using this test. As such, Kim is encouraged to contact me regularly to review any new genetic testing options that may be appropriate for her.    It is also important to " consider that her relatives with cancer, such as her mother, may have had a mutation in one of the genes tested and Kim did not inherit it. It may also be that her family history of cancer is sporadic/random.    Inheritance:  We reviewed the autosomal dominant inheritance of this variant in the POLD1 gene. We discussed that if Kim has biological children, she has a 50% chance to pass this variant to each of her children. Other family members may also carry this variant. Because it is unclear what, if any, risk is associated with this variant, clinical genetic testing for this POLD1 variant alone is not recommended for relatives.    Screening:  Based on this inconclusive test result, it is important for Kim and her relatives to refer back to the family history for appropriate cancer screening.      Based on her personal and family history, Kim has a 24.1% lifetime risk of developing breast cancer based on the JORDI 8 model. As such, Kim meets current National Comprehensive Cancer Network (NCCN) guidelines for high risk breast screening. This includes annual breast MRI in addition to annual mammogram beginning at age 40. In addition, Kim should be receiving clinical breast exams by her physician. We discussed that Kim could participate in our Cancer Risk Management Program in which our nursing specialist provides an individual screening plan and assists with medical management. A referral was made to see BLANCA Melendez for this service.    Due to Kim's family history of uterine cancer, Kim and other close maternal female relatives remain at slightly increased risk for uterine cancer. Available uterine cancer screening includes pelvic exams, endometrial sampling, and transvaginal ultrasounds, but there are significant limitations to this screening. As such, this screening is not typically recommended. That being said, women in this family should discuss their family history, this screening,  and the signs and symptoms of uterine cancer with their primary OB/GYN provider, as they may have individualized recommendations.    Other population cancer screening options, such as those recommended by the American Cancer Society and NCCN, are also appropriate for Kim and her family. These screening recommendations may change if there are changes to Kim's personal and/or family history of cancer. Final screening recommendations should be made by each individual's primary care provider.      Additional Testing Considerations:  Although Kim's genetic testing result is inconclusive, other relatives may still carry a harmful gene mutation associated with hereditary cancer. If any of her relatives do pursue genetic testing, Kim is encouraged to contact me so that we may review the impact of their test results on her.    Summary:  We do not have an explanation for Kim's family history of cancer. While no obviously harmful genetic changes were identified, Kim may still be at risk for certain cancers due to family history, environmental factors, or other genetic causes not identified by this test. Because of that, it is important that she continue with cancer screening based on her personal and family history as discussed above.    Genetic testing is rapidly advancing, and new cancer susceptibility genes will most likely be identified in the future. Therefore, I encouraged Kim to contact me regularly or if there are changes in her personal or family history. This may change how we assess her cancer risk, screening, and the testing we would offer.    Plan:  1. At her request, I provided [employer] with a copy of her test results via [facility]'s patient portal.    2. She plans to follow-up with her medical providers, as needed. A referral was placed for Kim to meet with BLANCA Melendez to discuss increased breast screening.  3. She should contact me regularly, or sooner if her family history changes.  4. I  will attempt to contact Kim if the laboratory informs me that this POLD1 variant has been reclassified. This may change screening and testing recommendations for Kim and her relatives.    If Kim has any further questions, I encouraged her to contact me at [phone].    Akosua Nails MS, [facility]  Licensed, Certified Genetic Counselor  Office: [phone]  Pager: [phone]

## 2022-11-07 NOTE — LETTER
11/7/2022         RE: Kim Solomon  [address] [place] [address]        Dear Colleague,    Thank you for referring your patient, Kim Solomon, to the [facility]. Please see a copy of my visit note below.    11/7/2022    Kim is a 47 year old who is being evaluated via a billable video visit.      How would you like to obtain your AVS? MyChart  If the video visit is dropped, the invitation should be resent by: Text to cell phone: [phone]  Will anyone else be joining your video visit? No    Video-Visit Details  Video Start Time: 8:00am  Type of service:  Video Visit  Video End Time:8:11am  Originating Location (pt. Location): Home  Distant Location (provider location):  Off-site  Platform used for Video Visit: [facility]     Referring Provider: ELVIA Armstrong CNP    Presenting Information:  I spoke to Kim by video today to discuss her genetic testing results. We last met on 9/27/2022 and her blood was drawn on 10/13/2022. The [facility] Common Hereditary Cancers Panel was ordered from [facility]. This testing was done because of Kim's family history of breast and uterine cancers.    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 This variant is called c.2115dup ([id number]*33). No other variants or mutations were found in the POLD1 gene. Given the uncertain significance of this result, medical management decisions should NOT be made based on this test result alone.    Of note, Kim tested negative for variants and mutations in the following genes by sequencing and deletion/duplication analysis: APC, MADHU, AXIN2, BARD1, BMPR1A, BRCA1, BRCA2, BRIP1, CDH1, CDK4, CDKN2A, CHEK2, CTNNA1, DICER1, EPCAM (deletions/duplications only), GREM1 (deletions/duplications only), HOXB13, KIT, MEN1, MLH1, MSH2, MSH3, MSH6, MUTYH, NBN, NF1, NTHL1, PALB2, PDGFRA,  "PMS2, POLE, PTEN, RAD50, RAD51C, RAD51D, SDHA (sequencing only), SDHB, SDHC, SDHD, SMAD4, SMARCA4, STK11, TP53,TSC1, TSC2, VHL.     We reviewed the autosomal dominant inheritance of these genes.     If Kim has biological children, she cannot pass on a mutation in any of these genes to her children based on this test result. Mutations in these genes do not skip generations.      A copy of the test report can be found in the Laboratory tab, dated 10/13/2022, and named \"LABORATORY MISCELLANEOUS ORDER\". The report is scanned in as a linked document.    Interpretation:  We discussed several different interpretations of this inconclusive test result. It is not clear if this variant in the POLD1 gene is associated with increased cancer risk.  1. This variant may be a benign change that does not increase cancer risk.  2. This variant may be a harmful mutation that causes Polymerase Proofreading-Associated Polyposis and/or health conditions.    Genetic testing labs are working to collect evidence to determine if this variant is harmful or benign, and [facility] will contact me if it is reclassified. If this variant is determined to be a benign change, there may be a different gene or combination of genes and environment that are associated with the cancers in this family that are not identifiable using this test. As such, Kim is encouraged to contact me regularly to review any new genetic testing options that may be appropriate for her.    It is also important to consider that her relatives with cancer, such as her mother, may have had a mutation in one of the genes tested and Kim did not inherit it. It may also be that her family history of cancer is sporadic/random.    Inheritance:  We reviewed the autosomal dominant inheritance of this variant in the POLD1 gene. We discussed that if Kim has biological children, she has a 50% chance to pass this variant to each of her children. Other family members may also carry this " variant. Because it is unclear what, if any, risk is associated with this variant, clinical genetic testing for this POLD1 variant alone is not recommended for relatives.    Screening:  Based on this inconclusive test result, it is important for Kim and her relatives to refer back to the family history for appropriate cancer screening.      Based on her personal and family history, Kim has a 24.1% lifetime risk of developing breast cancer based on the JORDI 8 model. As such, Kim meets current National Comprehensive Cancer Network (NCCN) guidelines for high risk breast screening. This includes annual breast MRI in addition to annual mammogram beginning at age 40. In addition, Kim should be receiving clinical breast exams by her physician. We discussed that Kim could participate in our Cancer Risk Management Program in which our nursing specialist provides an individual screening plan and assists with medical management. A referral was made to see BLANCA Melendez for this service.    Due to Kim's family history of uterine cancer, Kim and other close maternal female relatives remain at slightly increased risk for uterine cancer. Available uterine cancer screening includes pelvic exams, endometrial sampling, and transvaginal ultrasounds, but there are significant limitations to this screening. As such, this screening is not typically recommended. That being said, women in this family should discuss their family history, this screening, and the signs and symptoms of uterine cancer with their primary OB/GYN provider, as they may have individualized recommendations.    Other population cancer screening options, such as those recommended by the American Cancer Society and NCCN, are also appropriate for Kim and her family. These screening recommendations may change if there are changes to Kim's personal and/or family history of cancer. Final screening recommendations should be made by each  individual's primary care provider.      Additional Testing Considerations:  Although Kim's genetic testing result is inconclusive, other relatives may still carry a harmful gene mutation associated with hereditary cancer. If any of her relatives do pursue genetic testing, Kim is encouraged to contact me so that we may review the impact of their test results on her.    Summary:  We do not have an explanation for Kim's family history of cancer. While no obviously harmful genetic changes were identified, Kim may still be at risk for certain cancers due to family history, environmental factors, or other genetic causes not identified by this test. Because of that, it is important that she continue with cancer screening based on her personal and family history as discussed above.    Genetic testing is rapidly advancing, and new cancer susceptibility genes will most likely be identified in the future. Therefore, I encouraged Kim to contact me regularly or if there are changes in her personal or family history. This may change how we assess her cancer risk, screening, and the testing we would offer.    Plan:  1. At her request, I provided Kim with a copy of her test results via [facility] patient portal.    2. She plans to follow-up with her medical providers, as needed. A referral was placed for Kim to meet with BLANCA Melendez to discuss increased breast screening.  3. She should contact me regularly, or sooner if her family history changes.  4. I will attempt to contact Kim if the laboratory informs me that this POLD1 variant has been reclassified. This may change screening and testing recommendations for Kim and her relatives.    If Kim has any further questions, I encouraged her to contact me at [phone].      Again, thank you for allowing me to participate in the care of your patient.      Sincerely,    Akosua Nails GC

## 2022-11-07 NOTE — PROGRESS NOTES
11/7/2022    Kim is a 47 year old who is being evaluated via a billable video visit.      How would you like to obtain your AVS? [employer]  If the video visit is dropped, the invitation should be resent by: Text to cell phone: [phone]  Will anyone else be joining your video visit? No    Video-Visit Details  Video Start Time: 8:00am  Type of service:  Video Visit  Video End Time:8:11am  Originating Location (pt. Location): Home  Distant Location (provider location):  Off-site  Platform used for Video Visit: [facility]     Referring Provider: ELVIA Armstrong CNP    Presenting Information:  I spoke to Kim by video today to discuss her genetic testing results. We last met on 9/27/2022 and her blood was drawn on 10/13/2022. The [facility] Common Hereditary Cancers Panel was ordered from [facility]. This testing was done because of Kim's family history of breast and uterine cancers.    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 This variant is called c.2115dup (p.[id number]*33). No other variants or mutations were found in the POLD1 gene. Given the uncertain significance of this result, medical management decisions should NOT be made based on this test result alone.    Of note, Kim tested negative for variants and mutations in the following genes by sequencing and deletion/duplication analysis: APC, MADHU, AXIN2, BARD1, BMPR1A, BRCA1, BRCA2, BRIP1, CDH1, CDK4, CDKN2A, CHEK2, CTNNA1, DICER1, EPCAM (deletions/duplications only), GREM1 (deletions/duplications only), HOXB13, KIT, MEN1, MLH1, MSH2, MSH3, MSH6, MUTYH, NBN, NF1, NTHL1, PALB2, PDGFRA, PMS2, POLE, PTEN, RAD50, RAD51C, RAD51D, SDHA (sequencing only), SDHB, SDHC, SDHD, SMAD4, SMARCA4, STK11, TP53,TSC1, TSC2, VHL.     We reviewed the autosomal dominant inheritance of these genes.     If Kim has biological children, she cannot pass on a  "mutation in any of these genes to her children based on this test result. Mutations in these genes do not skip generations.      A copy of the test report can be found in the Laboratory tab, dated 10/13/2022, and named \"LABORATORY MISCELLANEOUS ORDER\". The report is scanned in as a linked document.    Interpretation:  We discussed several different interpretations of this inconclusive test result. It is not clear if this variant in the POLD1 gene is associated with increased cancer risk.  1. This variant may be a benign change that does not increase cancer risk.  2. This variant may be a harmful mutation that causes Polymerase Proofreading-Associated Polyposis and/or health conditions.    Genetic testing labs are working to collect evidence to determine if this variant is harmful or benign, and [facility] will contact me if it is reclassified. If this variant is determined to be a benign change, there may be a different gene or combination of genes and environment that are associated with the cancers in this family that are not identifiable using this test. As such, Kim is encouraged to contact me regularly to review any new genetic testing options that may be appropriate for her.    It is also important to consider that her relatives with cancer, such as her mother, may have had a mutation in one of the genes tested and Kim did not inherit it. It may also be that her family history of cancer is sporadic/random.    Inheritance:  We reviewed the autosomal dominant inheritance of this variant in the POLD1 gene. We discussed that if Kim has biological children, she has a 50% chance to pass this variant to each of her children. Other family members may also carry this variant. Because it is unclear what, if any, risk is associated with this variant, clinical genetic testing for this POLD1 variant alone is not recommended for relatives.    Screening:  Based on this inconclusive test result, it is important for Kim " and her relatives to refer back to the family history for appropriate cancer screening.      Based on her personal and family history, Kim has a 24.1% lifetime risk of developing breast cancer based on the JORDI 8 model. As such, Kim meets current National Comprehensive Cancer Network (NCCN) guidelines for high risk breast screening. This includes annual breast MRI in addition to annual mammogram beginning at age 40. In addition, Kim should be receiving clinical breast exams by her physician. We discussed that Kim could participate in our Cancer Risk Management Program in which our nursing specialist provides an individual screening plan and assists with medical management. A referral was made to see BLANCA Melendez for this service.    Due to Kim's family history of uterine cancer, Kim and other close maternal female relatives remain at slightly increased risk for uterine cancer. Available uterine cancer screening includes pelvic exams, endometrial sampling, and transvaginal ultrasounds, but there are significant limitations to this screening. As such, this screening is not typically recommended. That being said, women in this family should discuss their family history, this screening, and the signs and symptoms of uterine cancer with their primary OB/GYN provider, as they may have individualized recommendations.    Other population cancer screening options, such as those recommended by the American Cancer Society and NCCN, are also appropriate for Kim and her family. These screening recommendations may change if there are changes to Kim's personal and/or family history of cancer. Final screening recommendations should be made by each individual's primary care provider.      Additional Testing Considerations:  Although Kim's genetic testing result is inconclusive, other relatives may still carry a harmful gene mutation associated with hereditary cancer. If any of her relatives do pursue  genetic testing, Kim is encouraged to contact me so that we may review the impact of their test results on her.    Summary:  We do not have an explanation for Kim's family history of cancer. While no obviously harmful genetic changes were identified, Kim may still be at risk for certain cancers due to family history, environmental factors, or other genetic causes not identified by this test. Because of that, it is important that she continue with cancer screening based on her personal and family history as discussed above.    Genetic testing is rapidly advancing, and new cancer susceptibility genes will most likely be identified in the future. Therefore, I encouraged Kim to contact me regularly or if there are changes in her personal or family history. This may change how we assess her cancer risk, screening, and the testing we would offer.    Plan:  1. At her request, I provided Kim with a copy of her test results via "[facility] [employer]'s patient portal.    2. She plans to follow-up with her medical providers, as needed. A referral was placed for Kim to meet with BLANCA Melendez to discuss increased breast screening.  3. She should contact me regularly, or sooner if her family history changes.  4. I will attempt to contact Kim if the laboratory informs me that this POLD1 variant has been reclassified. This may change screening and testing recommendations for Kim and her relatives.    If Kim has any further questions, I encouraged her to contact me at [phone].    Akosua Nails MS, [facility]  Licensed, Certified Genetic Counselor  Office: [phone]  Pager: [phone]

## 2022-11-11 NOTE — PATIENT INSTRUCTIONS
Genetic Testing  You had a blood test that looked at the genetic information in one or more genes associated with increased cancer risk.  The testing looked for any harmful changes that would stop this particular gene from working like it should.  It is important to remember that everyone has variants in multiple genes in their bodies that do not affect how the gene works.  These changes are benign and do not cause disease or increase cancer risk.  The term often used to describe these variants is benign polymorphism.  If an individual is found to have a benign polymorphism, their genetic test result is reported as negative.    Results  There are three possible results of any genetic test:  Positive--a harmful mutation was identified  Negative--no mutation was identified  Variant of unknown significance--a variation in one of the genes was identified, but it is unclear how this impacts cancer risk in the family    Variant of Unknown Significance (VUS)  A VUS was identified in your blood sample.  Scientists currently do not know if this variant is harmful or if it is a benign polymorphism not associated with any increased risk of cancer.   There are several reasons for this lack of knowledge, but likely your VUS has either never been seen before or has only been seen in a small number of individuals.  Until your VUS is studied in more detail and/or seen in more families, scientists are not able to predict if it is a harmful mutation or a benign polymorphism.  Therefore, the cause of the cancer in you and your relatives is still unknown.          Reclassification  Genetic testing laboratories and researchers are constantly working to determine the importance of variants of unknown significance.  Most laboratories will notify the genetic counselor when a patient s VUS has been reclassified as a harmful mutation or a benign polymorphism.      Some families may be candidates for participation in the laboratory s  variant research programs to help determine the importance of their VUS.  Only the testing laboratory can decide who is eligible for participation.  Participating in these programs does not guarantee that families will learn the significance of their VUS immediately.  It could be months or years before a VUS is reclassified.       Screening Recommendations  Cancer screening recommendations for patients with a VUS should be based on their personal and family history rather than the VUS itself.  This may include increased cancer screening for certain individuals in the family.  Your genetic counselor and health care provider can help make appropriate recommendations for you and your relatives.      It is usually not recommended that other relatives have genetic testing for the VUS unless it is done as part of the laboratory s variant research program because an individual s test results should not influence their cancer screening until we determine the importance of the VUS.  Your genetic counselor can help you and your relatives understand the risks and benefits of all genetic testing and cancer screening options.    Please call us if you have any questions or concerns.     Cancer Risk Management Program [phone] ([phone])  Delfin Sanchez, MS [facility]  [phone]  Leona Healy, MS, [facility] [phone]  Sia Dean, MS, [facility]  [phone]  Rian Gray, MS, [facility]  [phone]  Julianna Mark, MS, [facility]  [phone]  Akosua Nails, MS, [facility] [phone]  Brina Garay, MS, [facility] [phone]

## 2022-11-29 ENCOUNTER — MYC REFILL (OUTPATIENT)
Dept: INTERNAL MEDICINE | Facility: CLINIC | Age: 47
End: 2022-11-29

## 2022-11-29 DIAGNOSIS — F98.8 ATTENTION DEFICIT DISORDER (ADD) WITHOUT HYPERACTIVITY: ICD-10-CM

## 2022-11-29 NOTE — TELEPHONE ENCOUNTER
lisdexamfetamine (VYVANSE) 30 MG capsule      Last Written Prescription Date:  11/1/22  Last Fill Quantity: 30,   # refills: 0   Last Office Visit : 8/3/22  Future Office visit:  12/15/22    Routing refill request to provider for review/approval because:  Controlled substance

## 2022-11-30 RX ORDER — LISDEXAMFETAMINE DIMESYLATE 30 MG/1
30 CAPSULE ORAL DAILY
Qty: 30 CAPSULE | Refills: 0 | Status: SHIPPED | OUTPATIENT
Start: 2022-12-01 | End: 2023-01-02

## 2022-12-15 ENCOUNTER — OFFICE VISIT (OUTPATIENT)
Dept: INTERNAL MEDICINE | Facility: CLINIC | Age: 47
End: 2022-12-15
Payer: COMMERCIAL

## 2022-12-15 VITALS
WEIGHT: 269.1 LBS | DIASTOLIC BLOOD PRESSURE: 84 MMHG | HEART RATE: 93 BPM | SYSTOLIC BLOOD PRESSURE: 138 MMHG | OXYGEN SATURATION: 96 % | BODY MASS INDEX: 43.43 KG/M2

## 2022-12-15 DIAGNOSIS — N93.9 ABNORMAL UTERINE BLEEDING: ICD-10-CM

## 2022-12-15 DIAGNOSIS — Z12.11 SCREENING FOR COLON CANCER: ICD-10-CM

## 2022-12-15 DIAGNOSIS — F98.8 ATTENTION DEFICIT DISORDER, UNSPECIFIED HYPERACTIVITY PRESENCE: ICD-10-CM

## 2022-12-15 DIAGNOSIS — D50.0 IRON DEFICIENCY ANEMIA DUE TO CHRONIC BLOOD LOSS: ICD-10-CM

## 2022-12-15 DIAGNOSIS — F43.21 GRIEF REACTION: ICD-10-CM

## 2022-12-15 DIAGNOSIS — Z23 NEED FOR VACCINATION: Primary | ICD-10-CM

## 2022-12-15 PROCEDURE — 91312 COVID-19 VACCINE BIVALENT BOOSTER 12+ (PFIZER): CPT | Performed by: INTERNAL MEDICINE

## 2022-12-15 PROCEDURE — 99215 OFFICE O/P EST HI 40 MIN: CPT | Mod: 25 | Performed by: INTERNAL MEDICINE

## 2022-12-15 PROCEDURE — 0124A COVID-19 VACCINE BIVALENT BOOSTER 12+ (PFIZER): CPT | Performed by: INTERNAL MEDICINE

## 2022-12-15 NOTE — PROGRESS NOTES
History of Present Illness:  Ms. Solomon is a 47 year old female who presents for  Chief Complaint   Patient presents with     RECHECK     General follow-up     Recheck Medication     omeprazole (PRILOSEC) 40 MG DR capsule     Heartburn     Derm Problem       PMH of SAADIA, asthma, obesity, JACOB, menorrhagia, GERD, depression, ADD, here for follow up.    I interim did genetic screening give family hx of breast/uterine cancer, found VUS with POLD1 gene, didn't convey increased cancer risk but Martha risk elevated so can qualify for high risk breast cancer screening  Also got IUD for menorrhagia, helping  Has gained some weight lately, BP is borderline elevated  Reports some grief during holiday time since first year losing parents  On sertraline 200 mg daily, vyvanse 30 mg, feeling less effective lately  Taking physics test over break      Review of external notes as documented above       A detailed Review of Systems was performed, verified and is negative except as documented in the HPI.  All health questionnaires were reviewed, verified and relevant information documented above.      Past Medical History:  Past Medical History:   Diagnosis Date     ADD (attention deficit disorder)     on vyvanse, [facility]     Alopecia areata      Anemia     See chart     Asthma      Depression      GERD (gastroesophageal reflux disease)      Impaired fasting blood sugar      Iron deficiency     heavy menses     Obesity      SAADIA (obstructive sleep apnea) 10/2015    mild, AHI  5.5, has mandibular adv device       Active Meds:  Current Outpatient Medications   Medication     albuterol (PROAIR HFA/PROVENTIL HFA/VENTOLIN HFA) 108 (90 Base) MCG/ACT inhaler     albuterol 90 MCG/ACT inhaler     cetirizine (ZYRTEC) 10 MG tablet     EPINEPHrine (EPIPEN JR) 0.15 MG/0.3ML injection     fluticasone (VERAMYST) 27.5 MCG/SPRAY spray     levonorgestrel (MIRENA) 20 MCG/DAY IUD     lisdexamfetamine  (VYVANSE) 30 MG capsule     montelukast (SINGULAIR) 10 MG tablet     omeprazole (PRILOSEC) 40 MG DR capsule     sertraline (ZOLOFT) 100 MG tablet     doxycycline hyclate (VIBRAMYCIN) 100 MG capsule     lisdexamfetamine (VYVANSE) 30 MG capsule     Current Facility-Administered Medications   Medication     triamcinolone acetonide (KENALOG-10) injection 5 mg        Allergies:  Reviewed, refer to EMR    Relevant Social History:  Social History     Tobacco Use     Smoking status: Never     Smokeless tobacco: Never   Substance Use Topics     Alcohol use: No     Comment: occasionally     Drug use: No        Physical Exam:  Vitals: /84 (BP Location: Right arm, Patient Position: Sitting, Cuff Size: Adult Large)   Pulse 93   Wt 122.1 kg (269 lb 1.6 oz)   LMP 11/11/2022 (Exact Date)   SpO2 96%   BMI 43.43 kg/m    Constitutional: Alert, oriented, pleasant, no acute distress  Head: Normocephalic, atraumatic  Eyes: Extra-ocular movements intact, pupils equally round bilaterally, no scleral icterus  ENT: Oropharynx clear, moist mucus membranes, good dentition  Neck: Supple, no lymphadenopathy  Cardiovascular: Regular rate and rhythm, no murmurs, rubs or gallops, peripheral pulses full/symmetric  Respiratory: Good air movement bilaterally, lungs clear, no wheezes/rales/rhonchi  GI: Abdomen soft, bowel sounds present, nondistended, nontender, no organomegaly or masses, no rebound/guarding  Musculoskeletal: No edema, normal muscle tone, normal gait  Neurologic: Alert and oriented, cranial nerves 2-12 intact, grossly non-focal  Psychiatric: normal mentation, affect and mood      Diagnostics:  Labs reviewed in Epic          Assessment and Plan:    Kim was seen today for follow up.    Diagnoses and all orders for this visit:    Need for vaccination  -     COVID-19 VACCINE BIVALENT BOOSTER 12+ (PFIZER)    BMI 40.0-44.9, adult (H)  Discussed diet/exercise, clinical strategies for weight loss. She declines referrals today.   -      Comprehensive metabolic panel; Future  -     TSH with free T4 reflex; Future  -     Lipid panel reflex to direct LDL Fasting; Future  -     Hemoglobin A1c; Future    Attention deficit disorder, unspecified hyperactivity presence  Cont vyvanse same dose for now, would consider increase but would need closer BP monitoring, potential treatment    Iron deficiency anemia due to chronic blood loss  -     CBC with platelets; Future    Screening for colon cancer  -     Colonoscopy Screening  Referral; Future    Abnormal uterine bleeding  Improved after IUD    Grief reaction  Discussed reinitiation of therapy, resources given, cont selective serotonin reuptake inhibitor         Cookie Donnelly MD  Internal Medicine    >40 minutes spent today performing chart review, history and exam, documentation and further activities as noted above exclusive of any procedures or EKG interpretation

## 2023-01-02 ENCOUNTER — MYC REFILL (OUTPATIENT)
Dept: INTERNAL MEDICINE | Facility: CLINIC | Age: 48
End: 2023-01-02

## 2023-01-02 DIAGNOSIS — F98.8 ATTENTION DEFICIT DISORDER (ADD) WITHOUT HYPERACTIVITY: ICD-10-CM

## 2023-01-04 RX ORDER — LISDEXAMFETAMINE DIMESYLATE 30 MG/1
30 CAPSULE ORAL DAILY
Qty: 30 CAPSULE | Refills: 0 | Status: SHIPPED | OUTPATIENT
Start: 2023-01-04 | End: 2023-02-01

## 2023-01-04 NOTE — TELEPHONE ENCOUNTER
Per  data, pt last had Vyvanse 30mg tablets refilled for a 30 day supply on 12/3/2022. Medication is due for refill on 1/2/2024. Pt was last seen by provider on 12/15/2022. Medication routed to provider for refill approval.     JOSEF MADDEN RN on 1/4/2023 at 10:05 AM

## 2023-02-01 ENCOUNTER — MYC REFILL (OUTPATIENT)
Dept: INTERNAL MEDICINE | Facility: CLINIC | Age: 48
End: 2023-02-01
Payer: COMMERCIAL

## 2023-02-01 DIAGNOSIS — F98.8 ATTENTION DEFICIT DISORDER (ADD) WITHOUT HYPERACTIVITY: ICD-10-CM

## 2023-02-01 NOTE — TELEPHONE ENCOUNTER
Pt was last seen in clinic on 12/15/2022. Pt last had albuterol (PROAIR HFA/PROVENTIL HFA/VENTOLIN HFA) 108 (90 Base) MCG/ACT inhaler refilled on unknown date, reported medication by patient. Albuterol mentioned as being taken in [facility] appointments. Medication routed to provider for prescription.     Per  data, pt last had lisdexamfetamine (VYVANSE) 30 MG capsule refilled for a 30 day supply on 1/5/2023. Medication is due for refill on 2/4/2023. Medication routed to provider for refill approval. Refill due 2/4.    JOSEF MADDEN RN on 2/1/2023 at 10:52 AM

## 2023-02-02 RX ORDER — LISDEXAMFETAMINE DIMESYLATE 30 MG/1
30 CAPSULE ORAL DAILY
Qty: 30 CAPSULE | Refills: 0 | Status: SHIPPED | OUTPATIENT
Start: 2023-02-04 | End: 2023-03-10

## 2023-02-02 RX ORDER — ALBUTEROL SULFATE 90 UG/1
2 AEROSOL, METERED RESPIRATORY (INHALATION) EVERY 4 HOURS PRN
Qty: 18 G | Refills: 1 | Status: SHIPPED | OUTPATIENT
Start: 2023-02-02

## 2023-03-10 ENCOUNTER — MYC REFILL (OUTPATIENT)
Dept: INTERNAL MEDICINE | Facility: CLINIC | Age: 48
End: 2023-03-10
Payer: COMMERCIAL

## 2023-03-10 DIAGNOSIS — F98.8 ATTENTION DEFICIT DISORDER (ADD) WITHOUT HYPERACTIVITY: ICD-10-CM

## 2023-03-10 RX ORDER — LISDEXAMFETAMINE DIMESYLATE 30 MG/1
30 CAPSULE ORAL DAILY
Qty: 30 CAPSULE | Refills: 0 | Status: SHIPPED | OUTPATIENT
Start: 2023-03-13 | End: 2023-04-14

## 2023-04-01 ENCOUNTER — HEALTH MAINTENANCE LETTER (OUTPATIENT)
Age: 48
End: 2023-04-01

## 2023-04-14 ENCOUNTER — MYC REFILL (OUTPATIENT)
Dept: INTERNAL MEDICINE | Facility: CLINIC | Age: 48
End: 2023-04-14
Payer: COMMERCIAL

## 2023-04-14 DIAGNOSIS — F98.8 ATTENTION DEFICIT DISORDER (ADD) WITHOUT HYPERACTIVITY: ICD-10-CM

## 2023-04-14 RX ORDER — LISDEXAMFETAMINE DIMESYLATE 30 MG/1
30 CAPSULE ORAL DAILY
Qty: 30 CAPSULE | Refills: 0 | Status: SHIPPED | OUTPATIENT
Start: 2023-04-14 | End: 2023-05-18

## 2023-04-14 NOTE — TELEPHONE ENCOUNTER
PDMP site reviewed. Vyvanse 30 mg capsule last sold on 3/15/23 for #30 capsules.    Will send to PCP for review.    Debbie Dawkins RN ([facility])

## 2023-05-18 ENCOUNTER — MYC REFILL (OUTPATIENT)
Dept: INTERNAL MEDICINE | Facility: CLINIC | Age: 48
End: 2023-05-18
Payer: COMMERCIAL

## 2023-05-18 DIAGNOSIS — F98.8 ATTENTION DEFICIT DISORDER (ADD) WITHOUT HYPERACTIVITY: ICD-10-CM

## 2023-05-18 RX ORDER — LISDEXAMFETAMINE DIMESYLATE 30 MG/1
30 CAPSULE ORAL DAILY
Qty: 30 CAPSULE | Refills: 0 | Status: SHIPPED | OUTPATIENT
Start: 2023-05-18 | End: 2023-06-24

## 2023-05-18 RX ORDER — EPINEPHRINE 0.3 MG/.3ML
0.3 INJECTION SUBCUTANEOUS PRN
Qty: 2 EACH | Refills: 0 | Status: SHIPPED | OUTPATIENT
Start: 2023-05-18

## 2023-05-18 NOTE — TELEPHONE ENCOUNTER
EPINEPHrine (EPIPEN JR) 0.15 MG/0.3ML injection   Last Written Prescription Date:  Reported/ historical   lisdexamfetamine (VYVANSE) 30 MG capsule  Last Written Prescription Date:  4/14/23  Last Fill Quantity: 30,   # refills: 0  Last Office Visit : 12/15/22  Future Office visit:  None    Routing refill request to provider for review/approval because:  Controlled substance- Vyvanse  Epipen: reported historical  Allergies listed: peanuts, prednisone, PCN,Orange fruit,medrol, Diptheria toxoid

## 2023-06-24 ENCOUNTER — MYC REFILL (OUTPATIENT)
Dept: INTERNAL MEDICINE | Facility: CLINIC | Age: 48
End: 2023-06-24
Payer: COMMERCIAL

## 2023-06-24 DIAGNOSIS — F98.8 ATTENTION DEFICIT DISORDER (ADD) WITHOUT HYPERACTIVITY: ICD-10-CM

## 2023-06-24 RX ORDER — LISDEXAMFETAMINE DIMESYLATE 30 MG/1
30 CAPSULE ORAL DAILY
Qty: 30 CAPSULE | Refills: 0 | Status: CANCELLED | OUTPATIENT
Start: 2023-06-24

## 2023-06-26 RX ORDER — LISDEXAMFETAMINE DIMESYLATE 30 MG/1
30 CAPSULE ORAL DAILY
Qty: 30 CAPSULE | Refills: 0 | Status: SHIPPED | OUTPATIENT
Start: 2023-06-26 | End: 2023-07-25

## 2023-06-26 NOTE — TELEPHONE ENCOUNTER
Per  data, pt last had lisdexamfetamine (VYVANSE) 30 MG capsule refilled for a 30 day supply on 5/19/23. Medication is due for refill on 6/18/23. Pt was last seen by provider on 12/15/22. Medication routed to provider for refill approval.     JOSEF MADDEN RN on 6/26/2023 at 7:42 AM

## 2023-07-13 DIAGNOSIS — Z00.00 ROUTINE GENERAL MEDICAL EXAMINATION AT A HEALTH CARE FACILITY: ICD-10-CM

## 2023-07-17 RX ORDER — OMEPRAZOLE 40 MG/1
CAPSULE, DELAYED RELEASE ORAL
Qty: 30 CAPSULE | Refills: 4 | Status: SHIPPED | OUTPATIENT
Start: 2023-07-17 | End: 2023-10-26

## 2023-07-17 NOTE — TELEPHONE ENCOUNTER
OMEPRAZOLE  40 MG CAPSULE    Office Visit    12/15/2022  [facility] [place]   Cookie Donnelly MD  Internal Medicine

## 2023-07-23 DIAGNOSIS — F98.8 ATTENTION DEFICIT DISORDER (ADD) WITHOUT HYPERACTIVITY: ICD-10-CM

## 2023-07-25 ENCOUNTER — MYC REFILL (OUTPATIENT)
Dept: INTERNAL MEDICINE | Facility: CLINIC | Age: 48
End: 2023-07-25
Payer: COMMERCIAL

## 2023-07-25 DIAGNOSIS — F98.8 ATTENTION DEFICIT DISORDER (ADD) WITHOUT HYPERACTIVITY: ICD-10-CM

## 2023-07-25 RX ORDER — LISDEXAMFETAMINE DIMESYLATE 30 MG/1
30 CAPSULE ORAL DAILY
Qty: 30 CAPSULE | Refills: 0 | Status: SHIPPED | OUTPATIENT
Start: 2023-07-26 | End: 2023-08-30

## 2023-07-26 RX ORDER — SERTRALINE HYDROCHLORIDE 100 MG/1
200 TABLET, FILM COATED ORAL DAILY
Qty: 180 TABLET | Refills: 1 | Status: SHIPPED | OUTPATIENT
Start: 2023-07-26 | End: 2024-01-02

## 2023-07-26 NOTE — TELEPHONE ENCOUNTER
Last visit  12/15/2022  [facility] [place]

## 2023-08-17 ENCOUNTER — ANCILLARY PROCEDURE (OUTPATIENT)
Dept: MAMMOGRAPHY | Facility: CLINIC | Age: 48
End: 2023-08-17
Attending: INTERNAL MEDICINE

## 2023-08-17 DIAGNOSIS — Z12.31 VISIT FOR SCREENING MAMMOGRAM: ICD-10-CM

## 2023-08-17 PROCEDURE — 77063 BREAST TOMOSYNTHESIS BI: CPT

## 2023-08-30 ENCOUNTER — MYC REFILL (OUTPATIENT)
Dept: INTERNAL MEDICINE | Facility: CLINIC | Age: 48
End: 2023-08-30
Payer: COMMERCIAL

## 2023-08-30 DIAGNOSIS — F98.8 ATTENTION DEFICIT DISORDER (ADD) WITHOUT HYPERACTIVITY: ICD-10-CM

## 2023-08-31 RX ORDER — LISDEXAMFETAMINE DIMESYLATE 30 MG/1
30 CAPSULE ORAL DAILY
Qty: 30 CAPSULE | Refills: 0 | Status: SHIPPED | OUTPATIENT
Start: 2023-08-31 | End: 2023-10-10

## 2023-08-31 NOTE — TELEPHONE ENCOUNTER
Per  data, pt last had lisdexamfetamine (VYVANSE) 30 MG capsule  refilled for a 30 supply on 7/31/23. Medication is due for refill on 8/30/23. Pt was last seen by provider on 12/15/22. Medication routed to provider for refill approval.     JOSEF MADDEN RN on 8/31/2023 at 7:40 AM

## 2023-10-10 ENCOUNTER — MYC REFILL (OUTPATIENT)
Dept: INTERNAL MEDICINE | Facility: CLINIC | Age: 48
End: 2023-10-10
Payer: COMMERCIAL

## 2023-10-10 DIAGNOSIS — F98.8 ATTENTION DEFICIT DISORDER (ADD) WITHOUT HYPERACTIVITY: ICD-10-CM

## 2023-10-10 RX ORDER — LISDEXAMFETAMINE DIMESYLATE 30 MG/1
30 CAPSULE ORAL DAILY
Qty: 30 CAPSULE | Refills: 0 | Status: SHIPPED | OUTPATIENT
Start: 2023-10-10 | End: 2023-10-20

## 2023-10-10 ASSESSMENT — SLEEP AND FATIGUE QUESTIONNAIRES
HOW LIKELY ARE YOU TO NOD OFF OR FALL ASLEEP WHILE SITTING AND READING: SLIGHT CHANCE OF DOZING
HOW LIKELY ARE YOU TO NOD OFF OR FALL ASLEEP IN A CAR, WHILE STOPPED FOR A FEW MINUTES IN TRAFFIC: WOULD NEVER DOZE
HOW LIKELY ARE YOU TO NOD OFF OR FALL ASLEEP WHILE LYING DOWN TO REST IN THE AFTERNOON WHEN CIRCUMSTANCES PERMIT: MODERATE CHANCE OF DOZING
HOW LIKELY ARE YOU TO NOD OFF OR FALL ASLEEP WHILE SITTING AND TALKING TO SOMEONE: WOULD NEVER DOZE
HOW LIKELY ARE YOU TO NOD OFF OR FALL ASLEEP WHILE SITTING INACTIVE IN A PUBLIC PLACE: WOULD NEVER DOZE
HOW LIKELY ARE YOU TO NOD OFF OR FALL ASLEEP WHILE SITTING QUIETLY AFTER LUNCH WITHOUT ALCOHOL: WOULD NEVER DOZE
HOW LIKELY ARE YOU TO NOD OFF OR FALL ASLEEP WHEN YOU ARE A PASSENGER IN A CAR FOR AN HOUR WITHOUT A BREAK: MODERATE CHANCE OF DOZING
HOW LIKELY ARE YOU TO NOD OFF OR FALL ASLEEP WHILE WATCHING TV: SLIGHT CHANCE OF DOZING

## 2023-10-10 NOTE — TELEPHONE ENCOUNTER
Per  data, pt last had lisdexamfetamine (VYVANSE) 30 MG capsule  refilled for a 30 day supply on 9/6/23. Medication is due for refill on 10/6/23. Pt was last seen by provider on 12/15/22. Medication routed to provider for refill approval.     JOSEF MADDEN RN on 10/10/2023 at 8:53 AM

## 2023-10-11 ENCOUNTER — VIRTUAL VISIT (OUTPATIENT)
Dept: SLEEP MEDICINE | Facility: CLINIC | Age: 48
End: 2023-10-11
Payer: COMMERCIAL

## 2023-10-11 VITALS — BODY MASS INDEX: 41.78 KG/M2 | WEIGHT: 260 LBS | HEIGHT: 66 IN

## 2023-10-11 DIAGNOSIS — G47.30 OBESITY WITH SLEEP APNEA: ICD-10-CM

## 2023-10-11 DIAGNOSIS — G47.33 OSA ON CPAP: Primary | ICD-10-CM

## 2023-10-11 DIAGNOSIS — E66.9 OBESITY WITH SLEEP APNEA: ICD-10-CM

## 2023-10-11 PROCEDURE — 99213 OFFICE O/P EST LOW 20 MIN: CPT | Mod: VID | Performed by: INTERNAL MEDICINE

## 2023-10-11 ASSESSMENT — PAIN SCALES - GENERAL: PAINLEVEL: NO PAIN (0)

## 2023-10-11 NOTE — NURSING NOTE
Has patient had flu shot for current/most recent flu season? If so, when? Yes: 9/26/2023      Is the patient currently in the state of [place]? YES    Visit mode:VIDEO    If the visit is dropped, the patient can be reconnected by: VIDEO VISIT: Text to cell phone:   Telephone Information:   Mobile [phone]       Will anyone else be joining the visit? NO  (If patient encounters technical issues they should call [phone]    How would you like to obtain your AVS? MyChart    Are changes needed to the allergy or medication list? No    Reason for visit: RECHECK    Fani MCFARLANE

## 2023-10-11 NOTE — PROGRESS NOTES
Virtual Visit Details    Type of service:  Video Visit     Originating Location (pt. Location): Home    Distant Location (provider location):  Off-site  Platform used for Video Visit: [facility]  Sleep clinic follow-up visit note     Date: October 11, 2023     Chief complaint:  Follow-up of SAADIA, review CPAP compliance measures    Kim Solomon is a 48  year old female who presents to sleep clinic for follow-up of previously diagnosed  obstructive sleep apnea that is currently managed with CPAP therapy.  She was set up at [facility] on September 12, 2017. Patient received a Resmed AirSense 10 Auto. Pressures were set at 5-15 cm H2O.     She was previously followed by Dr. Aguirre at the sleep clinic.    Previous sleep study report: PSG report   Study date: 10/14/2015  Weight: 226 lbs  The combined apnea/hypopnea index was 5.5 events per hour.  The REM AHI was 6.5 events per hour.  The supine AHI was 8.2 events per hour.  The RERA index was 0.1 events per hour.   The RDI was 5.6 events per hour.     Patient's gained weight since the last sleep study. Her current weight is 260 pounds  Pressure settings on CPAP ranged between 5 to 15 cmH2O  Patient reports using the CPAP regularly during sleep. She uses nasal mask.     There are no reports of snoring, apneas or awakenings due to gasping  with the device use.   She reports air hunger when she puts the mask on.  Patient reports good sleep quality with the device use.   Bedtime: 9:30 PM she usually is asleep by 10 PM.  She wakes up between 530 to 6 AM.  Patient denies nonrestorative sleep, fatigue or excessive daytime sleepiness.   ESS today: 6 /24(pre-CPAP treatment in June 2017 [place] sleepiness score was 16 out of 24)  There are no reports of  drowsiness while driving.       DOWNLOADABLE COMPLIANCE DATA: (ResMed) 5-15 cmH2O  For the past 1 month reveals that patient  used the device for 30 out of 30 days with an averag use of  7 hours and  36 minutes  on the days used. 95th percentile on leak was 19.5 L/min. Residual AHI was 1.2 per hour. Median pressure settings: 8.6; 95th percentile : 12.2 and max pressure: 13.7 cm water      Past medical/surgical history, family history, social history, medications and allergies were reviewed.       Problem list:  Patient Active Problem List   Diagnosis    Allergic rhinitis    Iron deficiency anemia    Undifferentiated inflammatory arthritis (H)    S/P laparoscopic cholecystectomy    Abdominal pain, unspecified abdominal location    SAADIA (obstructive sleep apnea)    Morbid obesity (H)            Physical Examination:   General: Pleasant. Cooperative. In no apparent distress.  Pulmonary: Able to speak in full sentences easily. No cough or wheeze.   Neurologic: Alert, oriented x3.  Psychiatric: Mood euthymic. Affect congruent with full range and intensity.     ASSESSMENT/PLAN:  Obstructive sleep apnea: Pt reports adequate compliance with CPAP and reports positive benefits with CPAP treatment. Based on compliance measures, SAADIA is adequately controlled with CPAP at the current settings.    DME orders were generated for revision of the pressure settings on the auto CPAP to range 8 to 15 cm water based on the report of air hunger and the compliance download and for renewal of CPAP supplies.  Recommended to continue using the CPAP regularly during sleep and instructed to communicate with me via [employer] if she has any trouble tolerating the revised pressure settings.  She was instructed to get the supplies for the CPAP replaced regularly.    She would be eligible for replacement CPAP device since her current device is more than 5 years old. However, she wants to continue using the current device and does not want a replacement  device at this time.  She will communicate with me in the future if she wants to obtain a prescription for replacement CPAP device.    Obesity: We discussed weight management with healthy diet, and exercise.    "  Patient was strongly advised to avoid driving, operating any heavy machinery or other hazardous situations while drowsy or sleepy.  Patient was counseled on the importance of driving while alert, to pull over if drowsy, or nap before getting into the vehicle if sleepy.      Follow-up with sleep clinic via video visit in 1 year or sooner if any concerns.     The above note was dictated using voice recognition software. Although reviewed after completion, some word and grammatical error may remain . Please contact the author for any clarifications.      \" Total time spent was 27 minutes  for this appointment on this date of service which include time spent before, during and after the visit for chart review, patient care, counseling and coordination of care. Including documentation\"      Alan Masters MD  [facility]  [address] , [place] [address]   "

## 2023-10-20 ENCOUNTER — MYC REFILL (OUTPATIENT)
Dept: INTERNAL MEDICINE | Facility: CLINIC | Age: 48
End: 2023-10-20
Payer: COMMERCIAL

## 2023-10-20 DIAGNOSIS — F98.8 ATTENTION DEFICIT DISORDER (ADD) WITHOUT HYPERACTIVITY: ICD-10-CM

## 2023-10-20 RX ORDER — LISDEXAMFETAMINE DIMESYLATE 30 MG/1
30 CAPSULE ORAL DAILY
Qty: 30 CAPSULE | Refills: 0 | Status: SHIPPED | OUTPATIENT
Start: 2023-10-20 | End: 2023-11-16

## 2023-10-24 DIAGNOSIS — Z00.00 ROUTINE GENERAL MEDICAL EXAMINATION AT A HEALTH CARE FACILITY: ICD-10-CM

## 2023-10-26 RX ORDER — OMEPRAZOLE 40 MG/1
40 CAPSULE, DELAYED RELEASE ORAL DAILY
Qty: 90 CAPSULE | Refills: 0 | Status: SHIPPED | OUTPATIENT
Start: 2023-10-26 | End: 2024-01-30

## 2023-10-26 NOTE — TELEPHONE ENCOUNTER
OMEPRAZOLE DR 40 MG CAPSULE   Last Written Prescription Date:  7/17/2023  Last Fill Quantity: 30,   # refills: 4  Last Office Visit :  12/15/2022  Future Office visit:  None    Routing refill request to provider for review/approval because:  Only 120 days sent to pharm last order  Okay to fill for 90 days with refills for Pt care.  Please review and send per Providers recommendations for Pt care.     Misti Lockhart RN  Central Triage Red Flags/Med Refills

## 2023-11-03 NOTE — NURSING NOTE
1 year appointment reminder message was created and will be sent out 2 - 3 months prior to next appointment due date. [facility] [employer]

## 2023-11-16 ENCOUNTER — MYC REFILL (OUTPATIENT)
Dept: INTERNAL MEDICINE | Facility: CLINIC | Age: 48
End: 2023-11-16
Payer: COMMERCIAL

## 2023-11-16 DIAGNOSIS — F98.8 ATTENTION DEFICIT DISORDER (ADD) WITHOUT HYPERACTIVITY: ICD-10-CM

## 2023-11-17 NOTE — TELEPHONE ENCOUNTER
Lisdexamfetamine 30 mg capsule last prescribed 10/20/23.    Will send to PCP for review.    Debbie Dawkins RN ([facility])

## 2023-11-20 RX ORDER — LISDEXAMFETAMINE DIMESYLATE 30 MG/1
30 CAPSULE ORAL DAILY
Qty: 30 CAPSULE | Refills: 0 | Status: SHIPPED | OUTPATIENT
Start: 2023-11-20 | End: 2024-01-02

## 2023-12-24 NOTE — TELEPHONE ENCOUNTER
Per  data, pt last had lisdexamfetamine (VYVANSE) 30 MG capsule refilled for a 30 day supply on 6/26/23. Medication is due for refill on 7/26/23. Pt was last seen by provider on 12/15/22. Medication routed to provider for refill approval.     JOSEF MADDEN RN on 7/25/2023 at 2:19 PM     None

## 2024-01-01 ASSESSMENT — PATIENT HEALTH QUESTIONNAIRE - PHQ9
10. IF YOU CHECKED OFF ANY PROBLEMS, HOW DIFFICULT HAVE THESE PROBLEMS MADE IT FOR YOU TO DO YOUR WORK, TAKE CARE OF THINGS AT HOME, OR GET ALONG WITH OTHER PEOPLE: SOMEWHAT DIFFICULT
SUM OF ALL RESPONSES TO PHQ QUESTIONS 1-9: 5
SUM OF ALL RESPONSES TO PHQ QUESTIONS 1-9: 5

## 2024-01-02 ENCOUNTER — MYC REFILL (OUTPATIENT)
Dept: INTERNAL MEDICINE | Facility: CLINIC | Age: 49
End: 2024-01-02

## 2024-01-02 ENCOUNTER — VIRTUAL VISIT (OUTPATIENT)
Dept: FAMILY MEDICINE | Facility: CLINIC | Age: 49
End: 2024-01-02
Payer: COMMERCIAL

## 2024-01-02 DIAGNOSIS — F98.8 ATTENTION DEFICIT DISORDER (ADD) WITHOUT HYPERACTIVITY: ICD-10-CM

## 2024-01-02 DIAGNOSIS — M06.4 UNDIFFERENTIATED INFLAMMATORY ARTHRITIS (H): ICD-10-CM

## 2024-01-02 DIAGNOSIS — G47.33 OSA (OBSTRUCTIVE SLEEP APNEA): ICD-10-CM

## 2024-01-02 DIAGNOSIS — J01.00 ACUTE NON-RECURRENT MAXILLARY SINUSITIS: Primary | ICD-10-CM

## 2024-01-02 PROCEDURE — 99213 OFFICE O/P EST LOW 20 MIN: CPT | Mod: 95 | Performed by: NURSE PRACTITIONER

## 2024-01-02 RX ORDER — DOXYCYCLINE 100 MG/1
100 CAPSULE ORAL 2 TIMES DAILY
Qty: 20 CAPSULE | Refills: 0 | Status: SHIPPED | OUTPATIENT
Start: 2024-01-02 | End: 2024-01-12

## 2024-01-02 ASSESSMENT — ENCOUNTER SYMPTOMS
SORE THROAT: 1
FEVER: 1

## 2024-01-02 NOTE — PROGRESS NOTES
Kim is a 48 year old who is being evaluated via a billable video visit.      How would you like to obtain your AVS? MyChart  If the video visit is dropped, the invitation should be resent by: Text to cell phone: [phone]  Will anyone else be joining your video visit? No            ICD-10-CM    1. Acute non-recurrent maxillary sinusitis  J01.00 doxycycline monohydrate (MONODOX) 100 MG capsule      2. Undifferentiated inflammatory arthritis (H)  M06.4       3. SAADIA (obstructive sleep apnea)  G47.33         Will treat sinusitis with doxycycline.  Educated on its indications and side effects.  She is instructed avoid sun exposure.  Patient is at higher risk of complications due to inflammatory arthritis and SAADIA.  I did encourage her to perform a COVID test to rule out underlying COVID.  Recommend using over-the-counter nasal saline sprays to assist with sinus congestion.  If no improvement in symptoms, she is to follow-up with her PCP.  She is content with the plan.    Subjective   Kim is a 48 year old, presenting for the following health issues:  Patient has comorbidities including undifferentiated inflammatory arthritis, SAADIA, obesity, iron deficiency anemia.  Patient complains of cold symptoms for over 1 week.  She has had sinus congestion with purulent drainage, facial pain and pressure, headache, teeth pain.  She denies stomachache, nausea, vomiting, diarrhea.  She has not lost her sense of smell or taste.  She had a fever of 100 Sunday evening.  She has been using over-the-counter Mucinex, Advil, antihistamines, Elle pot with no relief.  Her  was also ill.  She has not performed a COVID test.  She did receive an influenza vaccine.      Sinus Problem (For over a week), Pharyngitis (/), and Fever (Sunday evening)      1/2/2024     2:08 PM   Additional Questions   Roomed by Jenn       Sinus Problem   Associated symptoms include sore throat.   Pharyngitis     Fever  Associated symptoms include a  "fever and a sore throat.   History of Present Illness       Headaches:   Since the patient's last clinic visit, headaches are: no change  The patient is getting headaches:  Occasionally  She is not able to do normal daily activities when she has a migraine.  The patient is taking the following rescue/relief medications:  No rescue/relief medications and Ibuprofen (Advil, Motrin)   Patient states \"I get some relief\" from the rescue/relief medications.   The patient is taking the following medications to prevent migraines:  No medications to prevent migraines  In the past 4 weeks, the patient has gone to an Urgent Care or Emergency Room 0 times times due to headaches.    Reason for visit:  Severe sinus pressure, congestion, & drainage  Symptom onset:  1-2 weeks ago  Symptoms include:  See above  Symptom intensity:  Severe  Symptom progression:  Staying the same  Had these symptoms before:  Yes  Has tried/received treatment for these symptoms:  Yes  Previous treatment was successful:  Yes  Prior treatment description:  Antibiotics  What makes it worse:  Head down - bending over  What makes it better:  Neti pot & musinex for first few days then nothing    She eats 0-1 servings of fruits and vegetables daily.She consumes 2 sweetened beverage(s) daily.She exercises with enough effort to increase her heart rate 10 to 19 minutes per day.  She exercises with enough effort to increase her heart rate 4 days per week.   She is taking medications regularly.       Review of Systems   Constitutional:  Positive for fever.   HENT:  Positive for sore throat.       Constitutional, HEENT, cardiovascular, pulmonary, gi and gu systems are negative, except as otherwise noted.      Objective           Vitals:  No vitals were obtained today due to virtual visit.    Physical Exam   GENERAL: Healthy, alert and no distress  EYES: Eyes grossly normal to inspection.  No discharge or erythema, or obvious scleral/conjunctival abnormalities.  RESP: " No audible wheeze, cough, or visible cyanosis.  No visible retractions or increased work of breathing.    SKIN: Visible skin clear. No significant rash, abnormal pigmentation or lesions.  NEURO: Cranial nerves grossly intact.  Mentation and speech appropriate for age.  PSYCH: Mentation appears normal, affect normal/bright, judgement and insight intact, normal speech and appearance well-groomed.          Video-Visit Details    Type of service:  Video Visit     Originating Location (pt. Location): Home    Distant Location (provider location):  On-site  Platform used for Video Visit: Marty

## 2024-01-03 ENCOUNTER — VIRTUAL VISIT (OUTPATIENT)
Dept: FAMILY MEDICINE | Facility: CLINIC | Age: 49
End: 2024-01-03
Payer: COMMERCIAL

## 2024-01-03 DIAGNOSIS — U07.1 INFECTION DUE TO 2019 NOVEL CORONAVIRUS: Primary | ICD-10-CM

## 2024-01-03 PROCEDURE — 99213 OFFICE O/P EST LOW 20 MIN: CPT | Mod: 95 | Performed by: FAMILY MEDICINE

## 2024-01-03 NOTE — PROGRESS NOTES
"Kim is a 48 year old who is being evaluated via a billable video visit.      How would you like to obtain your AVS? MyChart  If the video visit is dropped, the invitation should be resent by: Text to cell phone: [phone]  Will anyone else be joining your video visit? No    Assessment & Plan   Problem List Items Addressed This Visit          Infectious/Inflammatory    Infection due to 2019 novel coronavirus - Primary     Covid positive with symptoms of worsening asthma used inhaler twice yesterday, but not yet today.  Symptoms (Sinus pressure, headache, fever of 100 on Sunday, nasal drainage, symptoms started on 12/29/23.  COVID positive x1 day,)          Relevant Medications    nirmatrelvir and ritonavir (PAXLOVID) 300 mg/100 mg therapy pack         BMI:   Estimated body mass index is 41.97 kg/m  as calculated from the following:    Height as of 10/11/23: 1.676 m (5' 6\").    Weight as of 10/11/23: 117.9 kg (260 lb).       Maria Fernanda Westbrook MD  [facility]   Kim is a 48 year old, presenting for the following health issues:  Symptoms (Sinus pressure, headache, fever of 100 on Sunday, nasal drainage, symptoms started on 12/29/23.  COVID positive x1 day,)        1/3/2024    10:18 AM   Additional Questions   Roomed by Asa Mantilla MA   Accompanied by Self         1/3/2024    10:18 AM   Patient Reported Additional Medications   Patient reports taking the following new medications None             Objective           Vitals:  No vitals were obtained today due to virtual visit.    Physical Exam   GENERAL: Healthy, alert and no distress  EYES: Eyes grossly normal to inspection.  No discharge or erythema, or obvious scleral/conjunctival abnormalities.  RESP: No audible wheeze, cough, or visible cyanosis.  No visible retractions or increased work of breathing.    SKIN: Visible skin clear. No significant rash, abnormal pigmentation or lesions.  NEURO: Cranial nerves grossly intact.  " Mentation and speech appropriate for age.  PSYCH: Mentation appears normal, affect normal/bright, judgement and insight intact, normal speech and appearance well-groomed.        Video-Visit Details    Type of service:  Video Visit   Originating Location (pt. Location): Home  Distant Location (provider location):  On-site  Platform used for Video Visit: Marty

## 2024-01-03 NOTE — ASSESSMENT & PLAN NOTE
Covid positive with symptoms of worsening asthma used inhaler twice yesterday, but not yet today.  Symptoms (Sinus pressure, headache, fever of 100 on Sunday, nasal drainage, symptoms started on 12/29/23.  COVID positive x1 day,)

## 2024-01-05 RX ORDER — LISDEXAMFETAMINE DIMESYLATE 30 MG/1
30 CAPSULE ORAL DAILY
Qty: 30 CAPSULE | Refills: 0 | Status: SHIPPED | OUTPATIENT
Start: 2024-01-05 | End: 2024-02-23

## 2024-01-19 ENCOUNTER — OFFICE VISIT (OUTPATIENT)
Dept: INTERNAL MEDICINE | Facility: CLINIC | Age: 49
End: 2024-01-19
Payer: COMMERCIAL

## 2024-01-19 ENCOUNTER — LAB (OUTPATIENT)
Dept: LAB | Facility: CLINIC | Age: 49
End: 2024-01-19
Payer: COMMERCIAL

## 2024-01-19 VITALS
DIASTOLIC BLOOD PRESSURE: 90 MMHG | SYSTOLIC BLOOD PRESSURE: 154 MMHG | WEIGHT: 274.7 LBS | OXYGEN SATURATION: 96 % | HEART RATE: 74 BPM | BODY MASS INDEX: 44.34 KG/M2

## 2024-01-19 DIAGNOSIS — Z11.4 SCREENING FOR HIV (HUMAN IMMUNODEFICIENCY VIRUS): ICD-10-CM

## 2024-01-19 DIAGNOSIS — Z12.11 SCREEN FOR COLON CANCER: Primary | ICD-10-CM

## 2024-01-19 DIAGNOSIS — Z13.1 SCREENING FOR DIABETES MELLITUS: ICD-10-CM

## 2024-01-19 DIAGNOSIS — Z11.59 SCREENING FOR VIRAL DISEASE: ICD-10-CM

## 2024-01-19 DIAGNOSIS — R03.0 ELEVATED BLOOD PRESSURE READING WITHOUT DIAGNOSIS OF HYPERTENSION: ICD-10-CM

## 2024-01-19 DIAGNOSIS — Z00.00 ROUTINE GENERAL MEDICAL EXAMINATION AT A HEALTH CARE FACILITY: ICD-10-CM

## 2024-01-19 DIAGNOSIS — Z13.220 SCREENING FOR HYPERLIPIDEMIA: ICD-10-CM

## 2024-01-19 DIAGNOSIS — Z23 NEED FOR COVID-19 VACCINE: ICD-10-CM

## 2024-01-19 DIAGNOSIS — Z12.11 SCREENING FOR COLON CANCER: ICD-10-CM

## 2024-01-19 DIAGNOSIS — Z11.59 NEED FOR HEPATITIS C SCREENING TEST: ICD-10-CM

## 2024-01-19 DIAGNOSIS — D50.0 IRON DEFICIENCY ANEMIA DUE TO CHRONIC BLOOD LOSS: ICD-10-CM

## 2024-01-19 LAB
ALBUMIN SERPL BCG-MCNC: 4.5 G/DL (ref 3.5–5.2)
ALP SERPL-CCNC: 72 U/L (ref 40–150)
ALT SERPL W P-5'-P-CCNC: 51 U/L (ref 0–50)
ANION GAP SERPL CALCULATED.3IONS-SCNC: 9 MMOL/L (ref 7–15)
AST SERPL W P-5'-P-CCNC: 35 U/L (ref 0–45)
BILIRUB SERPL-MCNC: 0.8 MG/DL
BUN SERPL-MCNC: 14.4 MG/DL (ref 6–20)
CALCIUM SERPL-MCNC: 9.1 MG/DL (ref 8.6–10)
CHLORIDE SERPL-SCNC: 104 MMOL/L (ref 98–107)
CHOLEST SERPL-MCNC: 209 MG/DL
CREAT SERPL-MCNC: 0.78 MG/DL (ref 0.51–0.95)
DEPRECATED HCO3 PLAS-SCNC: 29 MMOL/L (ref 22–29)
EGFRCR SERPLBLD CKD-EPI 2021: >90 ML/MIN/1.73M2
ERYTHROCYTE [DISTWIDTH] IN BLOOD BY AUTOMATED COUNT: 21.3 % (ref 10–15)
FASTING STATUS PATIENT QL REPORTED: NO
FERRITIN SERPL-MCNC: 18 NG/ML (ref 6–175)
GLUCOSE SERPL-MCNC: 106 MG/DL (ref 70–99)
HBA1C MFR BLD: 6.2 %
HCT VFR BLD AUTO: 31.4 % (ref 35–47)
HCV AB SERPL QL IA: NONREACTIVE
HDLC SERPL-MCNC: 43 MG/DL
HGB BLD-MCNC: 9.1 G/DL (ref 11.7–15.7)
IRON BINDING CAPACITY (ROCHE): 363 UG/DL (ref 240–430)
IRON SATN MFR SERPL: 4 % (ref 15–46)
IRON SERPL-MCNC: 16 UG/DL (ref 37–145)
LDLC SERPL CALC-MCNC: 137 MG/DL
MCH RBC QN AUTO: 18.3 PG (ref 26.5–33)
MCHC RBC AUTO-ENTMCNC: 29 G/DL (ref 31.5–36.5)
MCV RBC AUTO: 63 FL (ref 78–100)
NONHDLC SERPL-MCNC: 166 MG/DL
PLATELET # BLD AUTO: 282 10E3/UL (ref 150–450)
POTASSIUM SERPL-SCNC: 4.3 MMOL/L (ref 3.4–5.3)
PROT SERPL-MCNC: 7.8 G/DL (ref 6.4–8.3)
RBC # BLD AUTO: 4.97 10E6/UL (ref 3.8–5.2)
SODIUM SERPL-SCNC: 142 MMOL/L (ref 135–145)
TRIGL SERPL-MCNC: 144 MG/DL
TSH SERPL DL<=0.005 MIU/L-ACNC: 0.76 UIU/ML (ref 0.3–4.2)
WBC # BLD AUTO: 7.2 10E3/UL (ref 4–11)

## 2024-01-19 PROCEDURE — 80061 LIPID PANEL: CPT | Performed by: PATHOLOGY

## 2024-01-19 PROCEDURE — 84443 ASSAY THYROID STIM HORMONE: CPT | Performed by: PATHOLOGY

## 2024-01-19 PROCEDURE — 86803 HEPATITIS C AB TEST: CPT | Performed by: INTERNAL MEDICINE

## 2024-01-19 PROCEDURE — 99000 SPECIMEN HANDLING OFFICE-LAB: CPT | Performed by: PATHOLOGY

## 2024-01-19 PROCEDURE — 83540 ASSAY OF IRON: CPT | Performed by: PATHOLOGY

## 2024-01-19 PROCEDURE — 36415 COLL VENOUS BLD VENIPUNCTURE: CPT | Performed by: PATHOLOGY

## 2024-01-19 PROCEDURE — 83036 HEMOGLOBIN GLYCOSYLATED A1C: CPT | Performed by: INTERNAL MEDICINE

## 2024-01-19 PROCEDURE — 99214 OFFICE O/P EST MOD 30 MIN: CPT | Mod: 25 | Performed by: INTERNAL MEDICINE

## 2024-01-19 PROCEDURE — 87389 HIV-1 AG W/HIV-1&-2 AB AG IA: CPT | Performed by: INTERNAL MEDICINE

## 2024-01-19 PROCEDURE — 80053 COMPREHEN METABOLIC PANEL: CPT | Performed by: PATHOLOGY

## 2024-01-19 PROCEDURE — 82728 ASSAY OF FERRITIN: CPT | Performed by: PATHOLOGY

## 2024-01-19 PROCEDURE — 90480 ADMN SARSCOV2 VAC 1/ONLY CMP: CPT | Performed by: INTERNAL MEDICINE

## 2024-01-19 PROCEDURE — 85027 COMPLETE CBC AUTOMATED: CPT | Performed by: PATHOLOGY

## 2024-01-19 PROCEDURE — 83550 IRON BINDING TEST: CPT | Performed by: PATHOLOGY

## 2024-01-19 PROCEDURE — 91320 SARSCV2 VAC 30MCG TRS-SUC IM: CPT | Performed by: INTERNAL MEDICINE

## 2024-01-19 NOTE — PATIENT INSTRUCTIONS
Weight loss drugs:    Sematglutide / Wegovy    Terzepatide / Zepbound    Liraglutide / Saxenda        Try checking your blood pressure at home.  Can do it serially, three times in a row, to overcome measurement anxiety. Can check 1-2 times weekly for a month or so.  Check while you are feeling relaxed, not after heavy exertion or stress.  If consistently over 130/80, please let me know.

## 2024-01-19 NOTE — PROGRESS NOTES
Kim is a 48 year old that presents in clinic today for the following:     Chief Complaint   Patient presents with    Follow Up     Covid booster per pt     Recheck Medication           1/19/2024    11:14 AM   Additional Questions   Roomed by MR     Screenings from encounters over the past 10 days    No data recorded       Ria Downs, EMT at 11:20 AM on 1/19/2024      History of Present Illness:  Ms. Solomon is a 48 year old female who presents for  Chief Complaint   Patient presents with    Follow Up     Covid booster per pt     Recheck Medication     PMH of SAADIA, asthma, obesity, JACOB, menorrhagia, GERD, depression, ADD, here for follow up.    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  All health questionnaires were reviewed, verified and relevant information documented above.    Past Medical History:  Past Medical History:   Diagnosis Date    ADD (attention deficit disorder)     on vyvanse, [facility]    Alopecia areata     Anemia     See chart    Asthma     Depression     GERD (gastroesophageal reflux disease)     Impaired fasting blood sugar     Iron deficiency     heavy menses    Obesity     SAADIA (obstructive sleep apnea) 10/2015    mild, AHI  5.5, has  mandibular adv device       Past Surgical History:  Past Surgical History:   Procedure Laterality Date    ANTROSTOMY NASAL  6/21/2011    Procedure:ANTROSTOMY NASAL; Bilateral Maxillary  Antrostomy; Surgeon:JOSELUIS MISHRA; Location:[facility] OR    ENDOSCOPIC SINUS SURGERY  6/21/2011    Procedure:ENDOSCOPIC SINUS SURGERY; Bilateral Endoscopic Sinus Surgery Anterior Ethmoidectomy; Surgeon:JOSELUIS MISHRA; Location:[facility]    EYE SURGERY      LAPAROSCOPIC CHOLECYSTECTOMY WITH CHOLANGIOGRAMS  9/10/2012    Procedure: LAPAROSCOPIC CHOLECYSTECTOMY WITH CHOLANGIOGRAMS;  Laparoscopic Cholecystectomy,  ;  Surgeon: Juancho Jim MD;  Location: [facility] OR       Active Meds:  Current Outpatient Medications   Medication    albuterol (PROAIR HFA/PROVENTIL HFA/VENTOLIN HFA) 108 (90 Base) MCG/ACT inhaler    cetirizine (ZYRTEC) 10 MG tablet    levonorgestrel (MIRENA) 20 MCG/DAY IUD    lisdexamfetamine (VYVANSE) 30 MG capsule    omeprazole (PRILOSEC) 40 MG DR capsule    albuterol 90 MCG/ACT inhaler    EPINEPHrine (ANY BX GENERIC EQUIV) 0.3 MG/0.3ML injection 2-pack     No current facility-administered medications for this visit.        Allergies:  Diphtheria toxoid, Medrol [methylprednisolone sodium succinate], No clinical screening - see comments, Orange fruit, Pcn [penicillins], Peanuts [nuts], and Prednisone    Family History:  family history includes Anxiety Disorder in her mother; Breast Cancer in her maternal grandmother and mother; C.A.D. (age of onset: 69) in her mother; Cerebrovascular Disease in her mother; Depression in her father and mother; Diabetes in her maternal grandfather and maternal grandmother; Endometrial Cancer in her mother; Heart Failure in her maternal grandmother and mother; Hypertension in her father and mother; Parkinsonism in her father; Rheumatoid Arthritis in her mother; Skin Cancer in her mother; Uterine Cancer in her mother.    Social History:  Social History     Tobacco Use    Smoking status: Never     Passive  exposure: Never    Smokeless tobacco: Never   Vaping Use    Vaping Use: Never used   Substance Use Topics    Alcohol use: No     Comment: occasionally    Drug use: No       Physical Exam:  Vitals: BP (!) 154/90 (BP Location: Right arm, Patient Position: Sitting, Cuff Size: Adult Large)   Pulse 74   Wt 124.6 kg (274 lb 11.2 oz)   LMP 12/01/2023   SpO2 96%   BMI 44.34 kg/m    Constitutional: Alert, oriented, pleasant, no acute distress  Head: Normocephalic, atraumatic  Eyes: Extra-ocular movements intact, pupils equally round and reactive bilaterally, no scleral icterus  ENT: Oropharynx clear, moist mucus membranes, good dentition  Neck: Supple, no lymphadenopathy  Cardiovascular: Regular rate and rhythm, no murmurs, rubs or gallops, peripheral pulses full/symmetric  Respiratory: Good air movement bilaterally, lungs clear, no wheezes/rales/rhonchi  Musculoskeletal: No edema, normal muscle tone, normal gait  Neurologic: Alert and oriented, cranial nerves 2-12 intact, grossly non-focal  Psychiatric: normal mentation, affect and mood      Diagnostics:  Labs reviewed in Epic          Assessment and Plan:  Kim was seen today for follow up and recheck medication.    Diagnoses and all orders for this visit:    Screen for colon cancer  -     Colonoscopy Screening  Referral; Future    Screening for HIV (human immunodeficiency virus)  -     HIV Screening; Future    Need for hepatitis C screening test  -     Hepatitis C Screen Reflex to HCV RNA Quant and Genotype; Future    Need for COVID-19 vaccine  Done today      Screening for diabetes mellitus  -     Hemoglobin A1c; Future    Routine general medical examination at a health care facility  -     REVIEW OF HEALTH MAINTENANCE PROTOCOL ORDERS  -     Lipid panel reflex to direct LDL Non-fasting; Future    Screening for hyperlipidemia  -     Lipid panel reflex to direct LDL Non-fasting; Future    BMI 40.0-44.9, adult (H)  Discussed health consequences of obesity,  including blood pressure, diabetes, advised she consider weight loss medication if covered by insurance. She was agreeable to weight management clinic.  -     TSH with free T4 reflex; Future  -     Adult Comprehensive Weight Management  Referral; Future    Elevated blood pressure reading without diagnosis of hypertension  -     Comprehensive metabolic panel (BMP + Alb, Alk Phos, ALT, AST, Total. Bili, TP); Future  -     CBC with platelets; Future    Iron deficiency anemia due to chronic blood loss--> discovered after office visit  Unclear etiology, perhaps menorrhagia but needs more exploration, consider adding EGD with colo   -     Ferritin; Future  -     Iron and iron binding capacity; Future    Other orders  -     COVID-19 12+ (2023-24) (PFIZER)      Advised 6 week follow-up for BP, anemia, weight      Cookie Donnelly MD  Internal Medicine    >30 minutes spent today performing chart review, history and exam, counseling, care coordination, documentation and further activities as noted above exclusive of any procedures or EKG interpretation

## 2024-01-20 LAB — HIV 1+2 AB+HIV1 P24 AG SERPL QL IA: NONREACTIVE

## 2024-01-25 DIAGNOSIS — Z00.00 ROUTINE GENERAL MEDICAL EXAMINATION AT A HEALTH CARE FACILITY: ICD-10-CM

## 2024-01-25 DIAGNOSIS — F98.8 ATTENTION DEFICIT DISORDER (ADD) WITHOUT HYPERACTIVITY: ICD-10-CM

## 2024-01-30 RX ORDER — OMEPRAZOLE 40 MG/1
40 CAPSULE, DELAYED RELEASE ORAL DAILY
Qty: 90 CAPSULE | Refills: 3 | Status: SHIPPED | OUTPATIENT
Start: 2024-01-30

## 2024-01-30 RX ORDER — SERTRALINE HYDROCHLORIDE 100 MG/1
200 TABLET, FILM COATED ORAL DAILY
Qty: 180 TABLET | Refills: 1 | Status: SHIPPED | OUTPATIENT
Start: 2024-01-30 | End: 2024-09-05

## 2024-01-30 NOTE — TELEPHONE ENCOUNTER
SERTRALINE  MG TABLET      Last Written Prescription Date:  7/26/23  Last Fill Quantity: 180,   # refills: 1  Last Office Visit : 1/19/24  Future Office visit:  3/4/24    Routing refill request to provider for review/approval because:  Drug not active on patient's medication list  Discontinued 1/2/24-error?  Clinic note from 1/19/24 mentions patient taking med

## 2024-02-01 ENCOUNTER — MYC MEDICAL ADVICE (OUTPATIENT)
Dept: SLEEP MEDICINE | Facility: CLINIC | Age: 49
End: 2024-02-01
Payer: COMMERCIAL

## 2024-02-01 DIAGNOSIS — G47.33 OSA ON CPAP: Primary | ICD-10-CM

## 2024-02-06 ENCOUNTER — VIRTUAL VISIT (OUTPATIENT)
Dept: INTERNAL MEDICINE | Facility: CLINIC | Age: 49
End: 2024-02-06
Payer: COMMERCIAL

## 2024-02-06 DIAGNOSIS — D50.9 IRON DEFICIENCY ANEMIA, UNSPECIFIED IRON DEFICIENCY ANEMIA TYPE: Primary | ICD-10-CM

## 2024-02-06 PROCEDURE — 99214 OFFICE O/P EST MOD 30 MIN: CPT | Mod: 95 | Performed by: INTERNAL MEDICINE

## 2024-02-06 NOTE — NURSING NOTE
Is the patient currently in the state of [place]? YES    Visit mode:VIDEO    If the visit is dropped, the patient can be reconnected by: VIDEO VISIT: Text to cell phone:   Telephone Information:   Mobile [phone]       Will anyone else be joining the visit? NO  (If patient encounters technical issues they should call [phone]    How would you like to obtain your AVS? MyChart    Are changes needed to the allergy or medication list? Pt stated no changes to allergies and Pt stated no med changes    Reason for visit: RECHECK and Pt states follow up only    Frida MCFARLANE

## 2024-02-06 NOTE — PATIENT INSTRUCTIONS
Thank you for visiting the Primary Care Center today at the [facility]! The following is some information about our clinic:     Primary Care Center Frequently-Asked Questions    (1) How do I schedule appointments at the [facility]?     Primary Care--to schedule or make changes to an existing appointment, please call our primary care line at [phone].    Labs--to schedule a lab appointment at the [facility] you can use [employer] or call [phone]. If you have a [place] location that is closer to home, you can reach out to that location for scheduling options.     Imaging--if you need to schedule a CT, X-ray, MRI, ultrasound, or other imaging study you can call [phone] to schedule at the [facility] or any other [facility] imaging location.     Referrals--if a referral to another specialty was ordered you can expect a phone call from their scheduling team. If you have not heard from them in a week, please call us or send us a [employer] message to check the status or get a scheduling number. Please note that this only applies to internal [facility] referrals. If the referral is external you would need to contact their office for scheduling.     (2) I have a question about my visit, who do I contact?     You can call us at the primary care line at [phone] to ask questions about your visit. You can also send a secure message through [employer], which is reviewed by clinic staff. Please note that [employer] messages have a twenty-four to forty-eight business hour turnaround time and should not be used for urgent concerns.    (3) How will I get the results of my tests?    If you are signed up for [employer] all tests will be released to you within twenty-four hours of resulting. Please allow three to five days for your doctor to review your results and place a note interpreting the results. If you do not have [facility] you will receive your  results through mail seven to ten business days following the return of the tests. Please note that if there should be any urgent or concerning results that your doctor or their registered nurse will reach out to you the same day as the tests come back. If you have follow up questions about your results or would like to discuss the results in detail please schedule a follow up with your provider either in person or virtually.     (4) How do I get refills of my prescriptions?     You should always first contact your pharmacy for refills of your medications. If submitting a refill request on [employer], please be sure to submit the request only once--repeat requests can cause delays in refill. If you are requesting a NEW medication or a medication related to new symptoms you will need to schedule an appointment with a provider prior to approval. Please note: Routine medication refills have up to one to three business day turnaround whereas controlled substances refills have up to five to seven business day turnaround.    (5) I have new symptoms, what do I do?     If you are having an immediate medical emergency, you should dial 911 for assistance.   For anything urgent that needs to be seen within a few hours to one day you should visit a local urgent care for assistance.  For non-urgent symptoms that need to be seen within a few days to a week you can schedule with an available provider in primary care by going to [employer] or calling [phone].   If you are not sure how serious your symptoms are or you would like to receive medical advice you can always call [phone] to speak with a triage nurse.

## 2024-02-06 NOTE — PROGRESS NOTES
"  Kim Solomon is a 48 year old female who is being evaluated via a billable video visit for the following health issues:    Chief Complaint   Patient presents with    RECHECK    Pt states follow up only       HPI    PMH of elevated BMI, pre-dm, JACOB 2/2 menorrhagia, ADD  She hasn't been able to tolerate oral iron  She had menorrhagia causing JACOB, she got IUD, only light spotting now  Not a vegetarian, eats meat and greens  No GI issues, no GERD lately, takes PPI, no diarrhea, no bleeding  She is unable to tolerate oral iron                              Review of Systems   A detailed ROS was performed and was negative unless indicated in the HPI above.        Physical Exam   There were no vitals taken for this visit.    Wt Readings from Last 2 Encounters:   01/19/24 124.6 kg (274 lb 11.2 oz)   10/11/23 117.9 kg (260 lb)      Ht Readings from Last 2 Encounters:   10/11/23 1.676 m (5' 6\")   08/17/22 1.676 m (5' 6\")     GENERAL: healthy, alert and no distress  HEAD: Normocephalic, atraumatic  EYES: Eyes grossly normal to inspection, EOMI and conjunctivae and sclerae normal  RESP: Speaking in full sentences, unlabored, no audible wheezes or cough  SKIN: no suspicious lesions or rashes, no jaundice  NEURO: oriented, and speech normal  PSYCH: mentation appears normal, affect normal/bright          Diagnostic Test Results:  Labs reviewed in Epic            Assessment and Plan:  Kim was seen today for recheck and pt states follow up only.    Diagnoses and all orders for this visit:    Iron deficiency anemia, unspecified iron deficiency anemia type  Unclear source given lack of symptoms or bleeding. Advised EGD with upcoming colo. Discussed easier to tolerate oral iron supplements, plus option of IV iron.  Follow-up in 4 weeks.  -     Adult GI  Referral - Procedure Only; Future  -     Lab Blood Morphology Pathologist Review; Future  -     CBC with platelets; Future  -     Iron and iron binding capacity; Future  - "     Ferritin; Future          Video-Visit Details    Type of service:  Video Visit    Video Start/End Time:  8:51 AM 9:09 AM    Originating Location (pt. Location): Home    Distant Location (provider location):  OFF SITE    Mode of Communication:  Video Conference via  [employer] or  Rowan Donnelly MD  Internal Medicine      >30 minutes spent today performing chart review, history and exam, documentation and further activities as noted above.

## 2024-02-13 ENCOUNTER — TELEPHONE (OUTPATIENT)
Dept: GASTROENTEROLOGY | Facility: CLINIC | Age: 49
End: 2024-02-13
Payer: COMMERCIAL

## 2024-02-13 DIAGNOSIS — D50.9 IRON DEFICIENCY ANEMIA, UNSPECIFIED IRON DEFICIENCY ANEMIA TYPE: Primary | ICD-10-CM

## 2024-02-13 NOTE — TELEPHONE ENCOUNTER
"Endoscopy Scheduling Screen    Have you had a positive Covid test in the last 14 days?  No    Are you active on MyChart?   Yes    What insurance is in the chart?  Other:  HP    Ordering/Referring Provider: Cookie Donnelly MD     (If ordering provider performs procedure, schedule with ordering provider unless otherwise instructed. )    BMI: Estimated body mass index is 44.34 kg/m  as calculated from the following:    Height as of 10/11/23: 1.676 m (5' 6\").    Weight as of 1/19/24: 124.6 kg (274 lb 11.2 oz).     Sedation Ordered  moderate sedation.   If patient BMI > 50 do not schedule in [facility].    If patient BMI > 45 do not schedule at [facility].    Are you taking methadone or Suboxone?  No    Have you had difficulties, pain, or discomfort during past endoscopy procedures?  No    Are you taking any prescription medications for pain 3 or more times per week?   NO - No RN review required.    Do you have a history of malignant hyperthermia or adverse reaction to anesthesia?  No    (Females) Are you currently pregnant?   No     Have you been diagnosed or told you have pulmonary hypertension?   No    Do you have an LVAD?  No    Have you been told you have moderate to severe sleep apnea?  Yes (RN Review required for scheduling unless scheduling in Hospital.)    Have you been told you have COPD, asthma, or any other lung disease?  Yes     What breathing problems do you have?  Asthma     Do you use home oxygen?  No    Have your breathing problems required an ED visit or hospitalization in the last year?  No    Do you have any heart conditions?  No     Have you ever had an organ transplant?   No    Have you ever had or are you awaiting a heart or lung transplant?   No    Have you had a stroke or transient ischemic attack (TIA aka \"mini stroke\" in the last 6 months?   No    Have you been diagnosed with or been told you have cirrhosis of the liver?   No    Are you currently on dialysis?   No    Do you need assistance " "transferring?   No    BMI: Estimated body mass index is 44.34 kg/m  as calculated from the following:    Height as of 10/11/23: 1.676 m (5' 6\").    Weight as of 1/19/24: 124.6 kg (274 lb 11.2 oz).     Is patients BMI > 40 and scheduling location [facility]?  Yes (If MAC sedation is ordered, schedule PAC eval)    Do you take an injectable medication for weight loss or diabetes (excluding insulin)?  No    Do you take the medication Naltrexone?  No    Do you take blood thinners?  No       Prep   Are you currently on dialysis or do you have chronic kidney disease?  No    Do you have a diagnosis of diabetes?  No    Do you have a diagnosis of cystic fibrosis (CF)?  No    On a regular basis do you go 3 -5 days between bowel movements?  No    BMI > 40?  Yes (Extended Prep)    Preferred Pharmacy:      [address] [address]  Phone: [phone] Fax: [phone]      Final Scheduling Details   Colonoscopy prep sent?  Golytely Extended Prep    Procedure scheduled  Colonoscopy / Upper endoscopy (EGD)    Surgeon:  FRANCIS     Date of procedure:  5/17     Pre-OP / PAC:   No - Not required for this site.    Location  [facility] - Patient preference.    Sedation   Moderate Sedation - Per order.      Patient Reminders:   You will receive a call from a Nurse to review instructions and health history.  This assessment must be completed prior to your procedure.  Failure to complete the Nurse assessment may result in the procedure being cancelled.      On the day of your procedure, please designate an adult(s) who can drive you home stay with you for the next 24 hours. The medicines used in the exam will make you sleepy. You will not be able to drive.      You cannot take public transportation, ride share services, or non-medical taxi service without a responsible caregiver.  Medical transport services are allowed with the requirement that a " responsible caregiver will receive you at your destination.  We require that drivers and caregivers are confirmed prior to your procedure.

## 2024-02-13 NOTE — TELEPHONE ENCOUNTER
"Pre Assessment RN Review    Focused Assessments      SAADIA Hx  Estimated body mass index is 44.34 kg/m  as calculated from the following:    Height as of 10/11/23: 1.676 m (5' 6\").    Weight as of 1/19/24: 124.6 kg (274 lb 11.2 oz).     Patient has reported / documented history SAADIA and reports she does use a a device for sleep.     Device: CPAP    Severity Assessment    Sleep Study:   Diagnosis/Severity: Mild    AHI: 5.5          Scheduling Status & Recommendations    Location Type: No Scheduling Restrictions - Per exclusion criteria.  "

## 2024-02-23 ENCOUNTER — MYC REFILL (OUTPATIENT)
Dept: INTERNAL MEDICINE | Facility: CLINIC | Age: 49
End: 2024-02-23
Payer: COMMERCIAL

## 2024-02-23 DIAGNOSIS — F98.8 ATTENTION DEFICIT DISORDER (ADD) WITHOUT HYPERACTIVITY: ICD-10-CM

## 2024-02-28 RX ORDER — LISDEXAMFETAMINE DIMESYLATE 30 MG/1
30 CAPSULE ORAL DAILY
Qty: 30 CAPSULE | Refills: 0 | Status: SHIPPED | OUTPATIENT
Start: 2024-02-28 | End: 2024-04-05

## 2024-03-04 ENCOUNTER — OFFICE VISIT (OUTPATIENT)
Dept: INTERNAL MEDICINE | Facility: CLINIC | Age: 49
End: 2024-03-04
Payer: COMMERCIAL

## 2024-03-04 VITALS
BODY MASS INDEX: 44.2 KG/M2 | TEMPERATURE: 98.5 F | HEIGHT: 66 IN | DIASTOLIC BLOOD PRESSURE: 87 MMHG | HEART RATE: 77 BPM | SYSTOLIC BLOOD PRESSURE: 142 MMHG | OXYGEN SATURATION: 98 % | WEIGHT: 275 LBS

## 2024-03-04 DIAGNOSIS — Z12.4 CERVICAL CANCER SCREENING: Primary | ICD-10-CM

## 2024-03-04 DIAGNOSIS — D50.9 IRON DEFICIENCY ANEMIA, UNSPECIFIED IRON DEFICIENCY ANEMIA TYPE: ICD-10-CM

## 2024-03-04 DIAGNOSIS — I10 BENIGN ESSENTIAL HYPERTENSION: ICD-10-CM

## 2024-03-04 DIAGNOSIS — J01.01 ACUTE RECURRENT MAXILLARY SINUSITIS: ICD-10-CM

## 2024-03-04 PROCEDURE — 99214 OFFICE O/P EST MOD 30 MIN: CPT | Performed by: INTERNAL MEDICINE

## 2024-03-04 RX ORDER — DOXYCYCLINE 100 MG/1
100 CAPSULE ORAL 2 TIMES DAILY
Qty: 14 CAPSULE | Refills: 0 | Status: SHIPPED | OUTPATIENT
Start: 2024-03-04 | End: 2024-03-11

## 2024-03-04 RX ORDER — LISINOPRIL 5 MG/1
5 TABLET ORAL DAILY
Qty: 90 TABLET | Refills: 3 | Status: SHIPPED | OUTPATIENT
Start: 2024-03-04 | End: 2024-06-13

## 2024-03-04 ASSESSMENT — PAIN SCALES - GENERAL: PAINLEVEL: NO PAIN (0)

## 2024-03-04 NOTE — PATIENT INSTRUCTIONS
Get labs checked locally for potassium in 7-10 days after starting lisinopril.  Get iron levels checked in 2-3 months.  Visit/pap smear with me in Aug/Sept

## 2024-03-04 NOTE — PROGRESS NOTES
"  Assessment & Plan     Cervical cancer screening  Advised next visit    Benign essential hypertension  Discussed r/b/a to medication and she is amenable. Advised K check in 7-10 days after starting.  - lisinopril (ZESTRIL) 5 MG tablet; Take 1 tablet (5 mg) by mouth daily  - **Basic metabolic panel FUTURE 14d; Future    Acute recurrent maxillary sinusitis  - doxycycline hyclate (VIBRAMYCIN) 100 MG capsule; Take 1 capsule (100 mg) by mouth 2 times daily for 7 days    Iron deficiency anemia, unspecified iron deficiency anemia type  Repeat levels in 2-3 months, as well as upcoming EGD/colo  - CBC with platelets; Future  - Ferritin **(3 MO); Future  - Iron and iron binding capacity; Future    Follow-up with me in 6 months for pap/weight/BP follow-up             BMI  Estimated body mass index is 44.39 kg/m  as calculated from the following:    Height as of this encounter: 1.676 m (5' 6\").    Weight as of this encounter: 124.7 kg (275 lb).             Return in about 6 months (around 9/4/2024) for in person, with me, pap smear.    Delroy Alvarez is a 48 year old, presenting for the following health issues:  Follow Up (Iron levels/) and Sinus Problem (Pt has cough and sinus infection )      3/4/2024    11:03 AM   Additional Questions   Roomed by ANÍBAL, EMT     Sinus Problem     History of Present Illness       Reason for visit:  Follow up regarding anemia & more    She eats 2-3 servings of fruits and vegetables daily.She consumes 1 sweetened beverage(s) daily.She exercises with enough effort to increase her heart rate 10 to 19 minutes per day.  She exercises with enough effort to increase her heart rate 5 days per week. She is missing 1 dose(s) of medications per week.  She is not taking prescribed medications regularly due to remembering to take.    PMH of SAADIA on CPAP, asthma, obesity, JACOB, menorrhagia, GERD, depression, ADD, here for follow up.    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  Physical Exam   Constitutional: Alert, oriented, pleasant, no acute distress  Head: Normocephalic, atraumatic  Eyes: Extra-ocular movements intact, pupils equally round and reactive bilaterally, no scleral icterus  ENT: Oropharynx clear, moist mucus membranes, R TM with mild effusion, bilat maxillary tenderness  Neck: Supple, no lymphadenopathy, no thyromegaly  Cardiovascular: Regular rate and rhythm, no murmurs, rubs or gallops, peripheral pulses full/symmetric  Respiratory: Good air movement bilaterally, lungs clear, no wheezes/rales/rhonchi  Musculoskeletal: No edema, normal muscle tone, normal gait  Neurologic: Alert and oriented, cranial nerves 2-12 intact, strength 5/5 throughout  Psychiatric: normal mentation, affect and mood              Signed Electronically by: Cookie Donnelly MD    "

## 2024-03-05 ENCOUNTER — TELEPHONE (OUTPATIENT)
Dept: INTERNAL MEDICINE | Facility: CLINIC | Age: 49
End: 2024-03-05
Payer: COMMERCIAL

## 2024-04-05 ENCOUNTER — MYC REFILL (OUTPATIENT)
Dept: INTERNAL MEDICINE | Facility: CLINIC | Age: 49
End: 2024-04-05
Payer: COMMERCIAL

## 2024-04-05 DIAGNOSIS — F98.8 ATTENTION DEFICIT DISORDER (ADD) WITHOUT HYPERACTIVITY: ICD-10-CM

## 2024-04-08 RX ORDER — LISDEXAMFETAMINE DIMESYLATE 30 MG/1
30 CAPSULE ORAL DAILY
Qty: 30 CAPSULE | Refills: 0 | Status: SHIPPED | OUTPATIENT
Start: 2024-04-08

## 2024-04-19 RX ORDER — BISACODYL 5 MG/1
TABLET, DELAYED RELEASE ORAL
Qty: 4 TABLET | Refills: 0 | Status: SHIPPED | OUTPATIENT
Start: 2024-04-19

## 2024-05-07 ENCOUNTER — MYC MEDICAL ADVICE (OUTPATIENT)
Dept: GASTROENTEROLOGY | Facility: CLINIC | Age: 49
End: 2024-05-07

## 2024-05-07 ENCOUNTER — TELEPHONE (OUTPATIENT)
Dept: GASTROENTEROLOGY | Facility: CLINIC | Age: 49
End: 2024-05-07
Payer: COMMERCIAL

## 2024-05-07 DIAGNOSIS — D50.0 IRON DEFICIENCY ANEMIA DUE TO CHRONIC BLOOD LOSS: ICD-10-CM

## 2024-05-07 DIAGNOSIS — N92.4 EXCESSIVE BLEEDING IN PREMENOPAUSAL PERIOD: Primary | ICD-10-CM

## 2024-05-07 NOTE — TELEPHONE ENCOUNTER
Attempted to contact patient in order to complete pre assessment questions.     No answer. Left message to return call to [phone] option 4    Callback required communication sent via [employer].    Cookie Cohen RN  Endoscopy Procedure Pre Assessment RN

## 2024-05-07 NOTE — TELEPHONE ENCOUNTER
Pre visit planning completed.      Procedure details:    Patient scheduled for Colonoscopy/Upper endoscopy (EGD) on 5/17/2024.     Arrival time: 1100. Procedure time 1200    Facility location: [facility]; [address] [place] [address], [address] Floor, [place] [address]. Check in location: Main entrance at registration desk.    Sedation type: Conscious sedation     Pre op exam needed? N/A    Indication for procedure: Iron deficiency anemia, unspecified iron deficiency anemia type [D50.9]       Chart review:     Electronic implanted devices? No    Recent diagnosis of diverticulitis within the last 6 weeks? No    Diabetic? No      Medication review:    Anticoagulants? No    NSAIDS? No NSAID medications per patient's medication list.  RN will verify with pre-assessment call.    Other medication HOLDING recommendations:  N/A      Prep for procedure:     Bowel prep recommendation: Extended Golytely. Bowel prep prescription sent to    [facility] [place], [place] - [address] [place] [address]   Due to: BMI > 40.     Prep instructions sent via vitaly Cohen RN  Endoscopy Procedure Pre Assessment RN  [phone] option 4

## 2024-05-09 NOTE — TELEPHONE ENCOUNTER
Pre assessment completed for upcoming procedure.   (Please see previous telephone encounter notes for complete details)       Procedure details:    Arrival time and facility location reviewed.    Pre op exam needed? N/A    Designated  policy reviewed. Instructed to have someone stay 6  hours post procedure.       Medication review:    NSAID medication(s): Ibuprofen (Advil, Motrin): HOLD 1 day before procedure.      Prep for procedure:     Procedure prep instructions reviewed.        Any additional information needed:  N/A      Patient  verbalized understanding and had no questions or concerns at this time.      Cookie Cohen RN  Endoscopy Procedure Pre Assessment RN  [phone] option 4

## 2024-05-10 ENCOUNTER — MYC REFILL (OUTPATIENT)
Dept: INTERNAL MEDICINE | Facility: CLINIC | Age: 49
End: 2024-05-10
Payer: COMMERCIAL

## 2024-05-10 DIAGNOSIS — F98.8 ATTENTION DEFICIT DISORDER (ADD) WITHOUT HYPERACTIVITY: ICD-10-CM

## 2024-05-10 RX ORDER — LISDEXAMFETAMINE DIMESYLATE 30 MG/1
30 CAPSULE ORAL DAILY
Qty: 30 CAPSULE | Refills: 0 | Status: CANCELLED | OUTPATIENT
Start: 2024-05-10

## 2024-05-14 RX ORDER — LISDEXAMFETAMINE DIMESYLATE 30 MG/1
30 CAPSULE ORAL DAILY
Qty: 30 CAPSULE | Refills: 0 | Status: SHIPPED | OUTPATIENT
Start: 2024-07-13 | End: 2024-08-12

## 2024-05-14 RX ORDER — LISDEXAMFETAMINE DIMESYLATE 30 MG/1
30 CAPSULE ORAL DAILY
Qty: 30 CAPSULE | Refills: 0 | Status: SHIPPED | OUTPATIENT
Start: 2024-05-14 | End: 2024-06-13

## 2024-05-14 RX ORDER — LISDEXAMFETAMINE DIMESYLATE 30 MG/1
30 CAPSULE ORAL DAILY
Qty: 30 CAPSULE | Refills: 0 | Status: SHIPPED | OUTPATIENT
Start: 2024-06-13 | End: 2024-07-13

## 2024-05-17 ENCOUNTER — HOSPITAL ENCOUNTER (OUTPATIENT)
Facility: CLINIC | Age: 49
Discharge: HOME | End: 2024-05-17
Attending: INTERNAL MEDICINE | Admitting: INTERNAL MEDICINE
Payer: COMMERCIAL

## 2024-05-17 VITALS
SYSTOLIC BLOOD PRESSURE: 136 MMHG | HEART RATE: 68 BPM | OXYGEN SATURATION: 96 % | RESPIRATION RATE: 16 BRPM | DIASTOLIC BLOOD PRESSURE: 79 MMHG

## 2024-05-17 LAB
COLONOSCOPY: NORMAL
UPPER GI ENDOSCOPY: NORMAL

## 2024-05-17 PROCEDURE — 88305 TISSUE EXAM BY PATHOLOGIST: CPT | Mod: 26 | Performed by: PATHOLOGY

## 2024-05-17 PROCEDURE — 250N000011 HC RX IP 250 OP 636: Performed by: INTERNAL MEDICINE

## 2024-05-17 PROCEDURE — 99153 MOD SED SAME PHYS/QHP EA: CPT | Performed by: INTERNAL MEDICINE

## 2024-05-17 PROCEDURE — G0500 MOD SEDAT ENDO SERVICE >5YRS: HCPCS | Performed by: INTERNAL MEDICINE

## 2024-05-17 PROCEDURE — 88305 TISSUE EXAM BY PATHOLOGIST: CPT | Mod: TC | Performed by: INTERNAL MEDICINE

## 2024-05-17 PROCEDURE — 43239 EGD BIOPSY SINGLE/MULTIPLE: CPT | Performed by: INTERNAL MEDICINE

## 2024-05-17 PROCEDURE — 45378 DIAGNOSTIC COLONOSCOPY: CPT | Performed by: INTERNAL MEDICINE

## 2024-05-17 PROCEDURE — 88342 IMHCHEM/IMCYTCHM 1ST ANTB: CPT | Mod: 26 | Performed by: PATHOLOGY

## 2024-05-17 RX ORDER — FLUMAZENIL 0.1 MG/ML
0.2 INJECTION, SOLUTION INTRAVENOUS
Status: DISCONTINUED | OUTPATIENT
Start: 2024-05-17 | End: 2024-05-17 | Stop reason: HOSPADM

## 2024-05-17 RX ORDER — LIDOCAINE 40 MG/G
CREAM TOPICAL
Status: DISCONTINUED | OUTPATIENT
Start: 2024-05-17 | End: 2024-05-17 | Stop reason: HOSPADM

## 2024-05-17 RX ORDER — NALOXONE HYDROCHLORIDE 0.4 MG/ML
0.4 INJECTION, SOLUTION INTRAMUSCULAR; INTRAVENOUS; SUBCUTANEOUS
Status: DISCONTINUED | OUTPATIENT
Start: 2024-05-17 | End: 2024-05-17 | Stop reason: HOSPADM

## 2024-05-17 RX ORDER — FENTANYL CITRATE 50 UG/ML
INJECTION, SOLUTION INTRAMUSCULAR; INTRAVENOUS PRN
Status: DISCONTINUED | OUTPATIENT
Start: 2024-05-17 | End: 2024-05-17 | Stop reason: HOSPADM

## 2024-05-17 RX ORDER — ONDANSETRON 2 MG/ML
4 INJECTION INTRAMUSCULAR; INTRAVENOUS
Status: DISCONTINUED | OUTPATIENT
Start: 2024-05-17 | End: 2024-05-17 | Stop reason: HOSPADM

## 2024-05-17 RX ORDER — ONDANSETRON 2 MG/ML
4 INJECTION INTRAMUSCULAR; INTRAVENOUS EVERY 6 HOURS PRN
Status: DISCONTINUED | OUTPATIENT
Start: 2024-05-17 | End: 2024-05-17 | Stop reason: HOSPADM

## 2024-05-17 RX ORDER — NALOXONE HYDROCHLORIDE 0.4 MG/ML
0.2 INJECTION, SOLUTION INTRAMUSCULAR; INTRAVENOUS; SUBCUTANEOUS
Status: DISCONTINUED | OUTPATIENT
Start: 2024-05-17 | End: 2024-05-17 | Stop reason: HOSPADM

## 2024-05-17 RX ORDER — ONDANSETRON 4 MG/1
4 TABLET, ORALLY DISINTEGRATING ORAL EVERY 6 HOURS PRN
Status: DISCONTINUED | OUTPATIENT
Start: 2024-05-17 | End: 2024-05-17 | Stop reason: HOSPADM

## 2024-05-17 RX ORDER — PROCHLORPERAZINE MALEATE 5 MG
10 TABLET ORAL EVERY 6 HOURS PRN
Status: DISCONTINUED | OUTPATIENT
Start: 2024-05-17 | End: 2024-05-17 | Stop reason: HOSPADM

## 2024-05-17 ASSESSMENT — ACTIVITIES OF DAILY LIVING (ADL)
ADLS_ACUITY_SCORE: 35

## 2024-05-17 NOTE — H&P
ENDOSCOPY PRE-SEDATION H&P FOR OUTPATIENT PROCEDURES    Kim Solomon  [id number]  1975    Procedure: EGD/COL    Pre-procedure diagnosis: JACOB    Past medical history:   Past Medical History:   Diagnosis Date    ADD (attention deficit disorder)     on vyvanse, [facility]    Alopecia areata     Anemia     See chart    Asthma     Depression     GERD (gastroesophageal reflux disease)     Impaired fasting blood sugar     Iron deficiency     heavy menses    Obesity     SAADIA (obstructive sleep apnea) 10/2015    mild, AHI  5.5, has mandibular adv device       Past surgical history:   Past Surgical History:   Procedure Laterality Date    ANTROSTOMY NASAL  6/21/2011    Procedure:ANTROSTOMY NASAL; Bilateral Maxillary  Antrostomy; Surgeon:JOSELUIS MISHRA; Location:[facility] OR    ENDOSCOPIC SINUS SURGERY  6/21/2011    Procedure:ENDOSCOPIC SINUS SURGERY; Bilateral Endoscopic Sinus Surgery Anterior Ethmoidectomy; Surgeon:JOSELUIS MISHRA; Location:[facility] OR    EYE SURGERY      LAPAROSCOPIC CHOLECYSTECTOMY WITH CHOLANGIOGRAMS  9/10/2012    Procedure: LAPAROSCOPIC CHOLECYSTECTOMY WITH CHOLANGIOGRAMS;  Laparoscopic Cholecystectomy,  ;  Surgeon: Juancho Jim MD;  Location: [facility] OR       Current Facility-Administered Medications   Medication Dose Route Frequency Provider Last Rate Last Admin    flumazenil (ROMAZICON) injection 0.2 mg  0.2 mg Intravenous q1 min prn Андрей Mendez MD        naloxone (NARCAN) injection 0.2 mg  0.2 mg Intravenous Q2 Min PRN Андрей Mendez MD        naloxone (NARCAN) injection 0.2 mg  0.2 mg Intramuscular Q2 Min PRN Андрей Mendez MD        naloxone (NARCAN) injection 0.4 mg  0.4 mg Intravenous Q2 Min PRN Андрей Mendez MD        naloxone (NARCAN) injection 0.4 mg  0.4 mg Intramuscular Q2 Min PRN Андрей Mendez MD        ondansetron (ZOFRAN ODT) ODT tab 4 mg  4 mg Oral Q6H PRN Андрей Mendez  MD        Or    ondansetron (ZOFRAN) injection 4 mg  4 mg Intravenous Q6H PRN Андрей Mendez MD        prochlorperazine (COMPAZINE) injection 10 mg  10 mg Intravenous Q6H PRN Андрей Mendez MD        Or    prochlorperazine (COMPAZINE) tablet 10 mg  10 mg Oral Q6H PRN Андрей Mendez MD           Allergies   Allergen Reactions    Diphtheria Toxoid Hives    Medrol [Methylprednisolone Sodium Succinate] Hives     Per allergy testing. Allergic to all oral steroids. OK with topical and inhaled.    No Clinical Screening - See Comments Hives    Orange Fruit     Pcn [Penicillins] Hives    Peanuts [Nuts]      All legumes and related oils    Prednisone Hives       History of Anesthesia/Sedation Problems: no    PHYSICAL EXAMINATION:  Constitutional: aaox3, cooperative, pleasant  Vitals reviewed: BP (!) 105/90   Pulse 98   Resp 18   SpO2 98%   Wt:   Wt Readings from Last 2 Encounters:   03/04/24 124.7 kg (275 lb)   01/19/24 124.6 kg (274 lb 11.2 oz)      Eyes: Sclera anicteric/injected  Ears/nose/mouth/throat: Normal oropharynx without ulcers or exudate, mucus membranes moist, hearing intact  Neck: supple, thyroid normal size  CV: No edema  Respiratory: Unlabored breathing  Lymph: No submandibular, supraclavicular or inguinal lymphadenopathy  Abd: Nondistended, no masses, nontender  Skin: warm, perfused, no jaundice  Psych: Normal affect  MSK: normal movement on limited exam.    ASA Score: See Provation note    Assessment/Plan:     The patient is an appropriate candidate to receive sedation.    Informed consent was discussed with the patient/family, including the risks, benefits, potential complications and any alternative options associated with sedation.    Patient assessment completed just prior to sedation and while under constant observation by the provider. Condition determined to be adequate for proceeding with sedation.    The specific risks for the procedure were discussed with  the patient at the time of informed consent and include but are not limited to perforation which could require surgery, missing significant neoplasm or lesion, hemorrhage and adverse sedative complication.      Андрей Mendez MD

## 2024-05-22 LAB
PATH REPORT.ADDENDUM SPEC: NORMAL
PATH REPORT.COMMENTS IMP SPEC: NORMAL
PATH REPORT.COMMENTS IMP SPEC: NORMAL
PATH REPORT.FINAL DX SPEC: NORMAL
PATH REPORT.GROSS SPEC: NORMAL
PATH REPORT.MICROSCOPIC SPEC OTHER STN: NORMAL
PATH REPORT.RELEVANT HX SPEC: NORMAL
PHOTO IMAGE: NORMAL

## 2024-06-01 ENCOUNTER — HEALTH MAINTENANCE LETTER (OUTPATIENT)
Age: 49
End: 2024-06-01

## 2024-06-13 ENCOUNTER — LAB (OUTPATIENT)
Dept: LAB | Facility: CLINIC | Age: 49
End: 2024-06-13
Payer: COMMERCIAL

## 2024-06-13 ENCOUNTER — OFFICE VISIT (OUTPATIENT)
Dept: INTERNAL MEDICINE | Facility: CLINIC | Age: 49
End: 2024-06-13
Payer: COMMERCIAL

## 2024-06-13 VITALS
HEART RATE: 80 BPM | WEIGHT: 264.6 LBS | BODY MASS INDEX: 42.71 KG/M2 | DIASTOLIC BLOOD PRESSURE: 100 MMHG | OXYGEN SATURATION: 97 % | SYSTOLIC BLOOD PRESSURE: 154 MMHG

## 2024-06-13 DIAGNOSIS — I10 BENIGN ESSENTIAL HYPERTENSION: ICD-10-CM

## 2024-06-13 DIAGNOSIS — F98.8 ATTENTION DEFICIT DISORDER (ADD) WITHOUT HYPERACTIVITY: ICD-10-CM

## 2024-06-13 DIAGNOSIS — D50.9 IRON DEFICIENCY ANEMIA, UNSPECIFIED IRON DEFICIENCY ANEMIA TYPE: Primary | ICD-10-CM

## 2024-06-13 DIAGNOSIS — D50.9 IRON DEFICIENCY ANEMIA, UNSPECIFIED IRON DEFICIENCY ANEMIA TYPE: ICD-10-CM

## 2024-06-13 LAB
ALBUMIN SERPL BCG-MCNC: 4.3 G/DL (ref 3.5–5.2)
ALP SERPL-CCNC: 84 U/L (ref 40–150)
ALT SERPL W P-5'-P-CCNC: 33 U/L (ref 0–50)
ANION GAP SERPL CALCULATED.3IONS-SCNC: 11 MMOL/L (ref 7–15)
AST SERPL W P-5'-P-CCNC: 27 U/L (ref 0–45)
BASOPHILS # BLD AUTO: 0 10E3/UL (ref 0–0.2)
BASOPHILS NFR BLD AUTO: 1 %
BILIRUB SERPL-MCNC: 0.7 MG/DL
BUN SERPL-MCNC: 13.1 MG/DL (ref 6–20)
CALCIUM SERPL-MCNC: 9 MG/DL (ref 8.6–10)
CHLORIDE SERPL-SCNC: 105 MMOL/L (ref 98–107)
CREAT SERPL-MCNC: 0.66 MG/DL (ref 0.51–0.95)
DEPRECATED HCO3 PLAS-SCNC: 26 MMOL/L (ref 22–29)
EGFRCR SERPLBLD CKD-EPI 2021: >90 ML/MIN/1.73M2
EOSINOPHIL # BLD AUTO: 0.4 10E3/UL (ref 0–0.7)
EOSINOPHIL NFR BLD AUTO: 6 %
ERYTHROCYTE [DISTWIDTH] IN BLOOD BY AUTOMATED COUNT: 20.6 % (ref 10–15)
FERRITIN SERPL-MCNC: 18 NG/ML (ref 6–175)
GLUCOSE SERPL-MCNC: 110 MG/DL (ref 70–99)
HCT VFR BLD AUTO: 30.5 % (ref 35–47)
HGB BLD-MCNC: 8.9 G/DL (ref 11.7–15.7)
IMM GRANULOCYTES # BLD: 0 10E3/UL
IMM GRANULOCYTES NFR BLD: 1 %
IRON BINDING CAPACITY (ROCHE): 329 UG/DL (ref 240–430)
IRON SATN MFR SERPL: 5 % (ref 15–46)
IRON SERPL-MCNC: 17 UG/DL (ref 37–145)
LDH SERPL L TO P-CCNC: 198 U/L (ref 0–250)
LYMPHOCYTES # BLD AUTO: 1.5 10E3/UL (ref 0.8–5.3)
LYMPHOCYTES NFR BLD AUTO: 26 %
MCH RBC QN AUTO: 18.6 PG (ref 26.5–33)
MCHC RBC AUTO-ENTMCNC: 29.2 G/DL (ref 31.5–36.5)
MCV RBC AUTO: 64 FL (ref 78–100)
MONOCYTES # BLD AUTO: 0.4 10E3/UL (ref 0–1.3)
MONOCYTES NFR BLD AUTO: 7 %
NEUTROPHILS # BLD AUTO: 3.6 10E3/UL (ref 1.6–8.3)
NEUTROPHILS NFR BLD AUTO: 59 %
NRBC # BLD AUTO: 0 10E3/UL
NRBC BLD AUTO-RTO: 0 /100
PLATELET # BLD AUTO: 231 10E3/UL (ref 150–450)
POTASSIUM SERPL-SCNC: 3.9 MMOL/L (ref 3.4–5.3)
PROT SERPL-MCNC: 7.3 G/DL (ref 6.4–8.3)
RBC # BLD AUTO: 4.79 10E6/UL (ref 3.8–5.2)
RETICS # AUTO: 0.07 10E6/UL (ref 0.03–0.1)
RETICS/RBC NFR AUTO: 1.4 % (ref 0.5–2)
SODIUM SERPL-SCNC: 142 MMOL/L (ref 135–145)
WBC # BLD AUTO: 5.9 10E3/UL (ref 4–11)

## 2024-06-13 PROCEDURE — 83550 IRON BINDING TEST: CPT | Performed by: PATHOLOGY

## 2024-06-13 PROCEDURE — 83540 ASSAY OF IRON: CPT | Performed by: PATHOLOGY

## 2024-06-13 PROCEDURE — 83010 ASSAY OF HAPTOGLOBIN QUANT: CPT | Performed by: INTERNAL MEDICINE

## 2024-06-13 PROCEDURE — 82728 ASSAY OF FERRITIN: CPT | Performed by: INTERNAL MEDICINE

## 2024-06-13 PROCEDURE — 85060 BLOOD SMEAR INTERPRETATION: CPT | Performed by: STUDENT IN AN ORGANIZED HEALTH CARE EDUCATION/TRAINING PROGRAM

## 2024-06-13 PROCEDURE — 85045 AUTOMATED RETICULOCYTE COUNT: CPT | Performed by: PATHOLOGY

## 2024-06-13 PROCEDURE — 99000 SPECIMEN HANDLING OFFICE-LAB: CPT | Performed by: PATHOLOGY

## 2024-06-13 PROCEDURE — 99214 OFFICE O/P EST MOD 30 MIN: CPT | Performed by: INTERNAL MEDICINE

## 2024-06-13 PROCEDURE — 80053 COMPREHEN METABOLIC PANEL: CPT | Performed by: PATHOLOGY

## 2024-06-13 PROCEDURE — 36415 COLL VENOUS BLD VENIPUNCTURE: CPT | Performed by: PATHOLOGY

## 2024-06-13 PROCEDURE — 83615 LACTATE (LD) (LDH) ENZYME: CPT | Performed by: PATHOLOGY

## 2024-06-13 PROCEDURE — 85025 COMPLETE CBC W/AUTO DIFF WBC: CPT | Performed by: PATHOLOGY

## 2024-06-13 PROCEDURE — G2211 COMPLEX E/M VISIT ADD ON: HCPCS | Performed by: INTERNAL MEDICINE

## 2024-06-13 RX ORDER — LISINOPRIL 20 MG/1
20 TABLET ORAL DAILY
Qty: 90 TABLET | Refills: 3 | Status: SHIPPED | OUTPATIENT
Start: 2024-06-13

## 2024-06-13 NOTE — PATIENT INSTRUCTIONS
Try checking your blood pressure at home.  Can do it serially, three times in a row, to overcome measurement anxiety. Can check 1-2 times weekly for a month or so.  Check while you are feeling relaxed, not after heavy exertion or stress.  If consistently over 130/80, please let me know.    Omron digital is a good brand to get.        Can take 4 lisinoprils daily.  Once finished, refills at 20 mg dose.

## 2024-06-13 NOTE — PROGRESS NOTES
"  Assessment & Plan     Iron deficiency anemia, unspecified iron deficiency anemia type  Recent GI eval neg, patient asymptomatic but has not responded particularly well to oral iron, may be ongoing deficit from previous menorrhagia, could consider IV iron and potentially heme referral or capsule endoscopy if persists/worsens  - CBC with platelets; Future  - Lactate Dehydrogenase; Future  - Haptoglobin; Future  - Lab Blood Morphology Pathologist Review; Future  - Ferritin; Future  - Iron and iron binding capacity; Future    Benign essential hypertension  Recommend increased dose lisinopril, ongoing monitoring, unlikely related to stimulant given use of stimulant predated HTN, but may need to consider downtitration of stimulant, cont to work on weight management  - Comprehensive metabolic panel (BMP + Alb, Alk Phos, ALT, AST, Total. Bili, TP); Future  - lisinopril (ZESTRIL) 20 MG tablet; Take 1 tablet (20 mg) by mouth daily    Attention deficit disorder (ADD) without hyperactivity  See above      The longitudinal plan of care for the diagnosis(es)/condition(s) as documented were addressed during this visit. Due to the added complexity in care, I will continue to support Kim in the subsequent management and with ongoing continuity of care.      BMI  Estimated body mass index is 42.71 kg/m  as calculated from the following:    Height as of 3/4/24: 1.676 m (5' 6\").    Weight as of this encounter: 120 kg (264 lb 9.6 oz).             No follow-ups on file.    Subjective   Kim is a 48 year old, presenting for the following health issues:  Follow Up (Pt here for follow up on endoscopy/colonoscopy)      6/13/2024     9:25 AM   Additional Questions   Roomed by SILVINO BETH     History of Present Illness     Asthma:  She presents for follow up of asthma.  She has some cough, no wheezing, and no shortness of breath.  She is using a relief medication daily. She does not have a controller medication. Patient is aware of the " following triggers: cold air and exercise or sports. The patient has not had a visit to the Emergency Room, Urgent Care or Hospital due to asthma since the last clinic visit.     Hypertension: She presents for follow up of hypertension.  She does not check blood pressure  regularly outside of the clinic. Outpatient blood pressures have not been over 140/90. She does not follow a low salt diet.     Reason for visit:  Follow up re: anemia    She eats 0-1 servings of fruits and vegetables daily.She consumes 1 sweetened beverage(s) daily.She exercises with enough effort to increase her heart rate 10 to 19 minutes per day.  She exercises with enough effort to increase her heart rate 4 days per week.   She is taking medications regularly.         PMH of SAADIA on CPAP, asthma, obesity, JACOB, menorrhagia, GERD, depression, ADD, here for follow up.    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, didn't convey increased cancer risk but Martha risk elevated so can qualify for high risk breast cancer screening      Routine Health Maintenence:  Lung Ca Screening (>20 pk age 50-79): n/a  Colonoscopy (45-75 yrs): 5/24 normal, 10 years follow-up   Dexa (>65W): n/a  Mammogram (40-75 yrs): 8/23, due, qualifies for high risk screening per Martha model, also VUS POLD1 gene, +fam xh of breast/uterine ca  Pap (21-65 yrs): due      Latest Ref Rng & Units 8/1/2019     9:47 AM 8/1/2019     9:00 AM   PAP / HPV   PAP (Historical)  NIL     HPV 16 DNA NEG^Negative  Negative    HPV 18 DNA NEG^Negative  Negative    Other HR HPV NEG^Negative  Negative                         Objective    BP (!) 154/100 (BP Location: Right arm, Patient Position: Sitting, Cuff Size: Adult Large)   Pulse 80   Wt 120 kg (264 lb 9.6 oz)   SpO2 97%   BMI 42.71 kg/m    Body mass index is 42.71 kg/m .  Physical Exam   GENERAL: alert and no distress  NECK: no adenopathy, no asymmetry, masses, or scars  RESP: lungs clear to auscultation - no rales, rhonchi or wheezes  CV: regular rate and rhythm, normal S1 S2, no S3 or S4, no murmur, click or rub, no peripheral edema  ABDOMEN: soft, nontender, no hepatosplenomegaly, no masses and bowel sounds normal  MS: no gross musculoskeletal defects noted, no edema  NEURO: Normal strength and tone, mentation intact and speech normal  PSYCH: mentation appears normal, affect normal/bright            Signed Electronically by: Cookie Donnelly MD

## 2024-06-14 ENCOUNTER — E-CONSULT (OUTPATIENT)
Dept: ONCOLOGY | Facility: CLINIC | Age: 49
End: 2024-06-14
Payer: COMMERCIAL

## 2024-06-14 DIAGNOSIS — D50.0 IRON DEFICIENCY ANEMIA DUE TO CHRONIC BLOOD LOSS: Primary | ICD-10-CM

## 2024-06-14 DIAGNOSIS — D50.9 IRON DEFICIENCY ANEMIA, UNSPECIFIED IRON DEFICIENCY ANEMIA TYPE: Primary | ICD-10-CM

## 2024-06-14 LAB
HAPTOGLOB SERPL-MCNC: 189 MG/DL (ref 30–200)
PATH REPORT.COMMENTS IMP SPEC: NORMAL
PATH REPORT.COMMENTS IMP SPEC: NORMAL
PATH REPORT.FINAL DX SPEC: NORMAL
PATH REPORT.MICROSCOPIC SPEC OTHER STN: NORMAL
PATH REPORT.MICROSCOPIC SPEC OTHER STN: NORMAL
PATH REPORT.RELEVANT HX SPEC: NORMAL

## 2024-06-14 PROCEDURE — 99451 NTRPROF PH1/NTRNET/EHR 5/>: CPT | Performed by: STUDENT IN AN ORGANIZED HEALTH CARE EDUCATION/TRAINING PROGRAM

## 2024-06-14 NOTE — PROGRESS NOTES
6/14/2024     E-Consult has been accepted.    Interprofessional consultation requested by:  Cookie Donnelly MD      Clinical Question/Purpose: MY CLINICAL QUESTION IS: Iron deficiency anemia evaluation    Patient assessment and information reviewed:     48-year-old woman with a history of menorrhagia and iron deficiency anemia referred for hematology evaluation.  Has had IUD placed and is no longer menstruating.  Her labs are consistent with iron deficiency anemia (ferritin less than 30 ng/mL, iron saturation index 5%).  It is likely that this is persistent iron deficiency that developed previously from menorrhagia and has not improved with oral iron.  Not all patients respond well to IV iron due to absorption issues.  B12 was normal several years ago, but would recheck folate and B12 to evaluate for malabsorption.  This would be another  of iron deficiency separate from blood loss that may require its own evaluation.    Either way, at this point would suggest repletion with IV iron.  Would recommend 1 dose of 1000 mg IV iron dextran.  If it is not possible to order this in the primary care setting, the patient can be referred to hematology for IV iron.    Recommendations:   -1000 mg IV iron dextran  -Check B12 and folate  -Recheck CBC, iron studies and ferritin in 3 months after IV iron      The recommendations provided in this E-Consult are based on a review of clinical data pertinent to the clinical question presented, without a review of the patient's complete medical record or, the benefit of a comprehensive in-person or virtual patient evaluation. This consultation should not replace the clinical judgement and evaluation of the provider ordering this E-Consult. Any new clinical issues, or changes in patient status since the filing of this E-Consult will need to be taken into account when assessing these recommendations. Please contact me if you have further questions.    My total time spent reviewing  clinical information and formulating assessment was 30 minutes.        Jacob Cogan, MD

## 2024-06-18 PROBLEM — N92.4 EXCESSIVE BLEEDING IN PREMENOPAUSAL PERIOD: Status: ACTIVE | Noted: 2024-06-18

## 2024-06-18 RX ORDER — METHYLPREDNISOLONE SODIUM SUCCINATE 125 MG/2ML
125 INJECTION, POWDER, LYOPHILIZED, FOR SOLUTION INTRAMUSCULAR; INTRAVENOUS
Status: CANCELLED
Start: 2024-06-25

## 2024-06-18 RX ORDER — ALBUTEROL SULFATE 0.83 MG/ML
2.5 SOLUTION RESPIRATORY (INHALATION)
Status: CANCELLED | OUTPATIENT
Start: 2024-06-25

## 2024-06-18 RX ORDER — HEPARIN SODIUM,PORCINE 10 UNIT/ML
5-20 VIAL (ML) INTRAVENOUS DAILY PRN
Status: CANCELLED | OUTPATIENT
Start: 2024-06-25

## 2024-06-18 RX ORDER — EPINEPHRINE 1 MG/ML
0.3 INJECTION, SOLUTION, CONCENTRATE INTRAVENOUS EVERY 5 MIN PRN
Status: CANCELLED | OUTPATIENT
Start: 2024-06-25

## 2024-06-18 RX ORDER — ALBUTEROL SULFATE 90 UG/1
1-2 AEROSOL, METERED RESPIRATORY (INHALATION)
Status: CANCELLED
Start: 2024-06-25

## 2024-06-18 RX ORDER — DIPHENHYDRAMINE HYDROCHLORIDE 50 MG/ML
50 INJECTION INTRAMUSCULAR; INTRAVENOUS
Status: CANCELLED
Start: 2024-06-25

## 2024-06-18 RX ORDER — HEPARIN SODIUM (PORCINE) LOCK FLUSH IV SOLN 100 UNIT/ML 100 UNIT/ML
5 SOLUTION INTRAVENOUS
Status: CANCELLED | OUTPATIENT
Start: 2024-06-25

## 2024-06-18 RX ORDER — MEPERIDINE HYDROCHLORIDE 25 MG/ML
25 INJECTION INTRAMUSCULAR; INTRAVENOUS; SUBCUTANEOUS EVERY 30 MIN PRN
Status: CANCELLED | OUTPATIENT
Start: 2024-06-25

## 2024-06-18 NOTE — CONFIDENTIAL NOTE
Order for IV iron placed for infusion center.    Can we reach out to assist patient with scheduling?        Thanks,  Cookie Donnelly MD  Internal Medicine

## 2024-06-18 NOTE — CONFIDENTIAL NOTE
Dr. Fern[facility]dez [facility]  Discussed with patient re: reports of oral steroid allergy.  Believes medrol dose pack caused hives, recalls she may have been able to tolerate prednisone in past.

## 2024-07-08 ENCOUNTER — INFUSION THERAPY VISIT (OUTPATIENT)
Dept: INFUSION THERAPY | Facility: HOSPITAL | Age: 49
End: 2024-07-08
Payer: COMMERCIAL

## 2024-07-08 VITALS
HEART RATE: 72 BPM | OXYGEN SATURATION: 96 % | SYSTOLIC BLOOD PRESSURE: 156 MMHG | RESPIRATION RATE: 16 BRPM | DIASTOLIC BLOOD PRESSURE: 76 MMHG | TEMPERATURE: 98.1 F

## 2024-07-08 DIAGNOSIS — D50.0 IRON DEFICIENCY ANEMIA DUE TO CHRONIC BLOOD LOSS: ICD-10-CM

## 2024-07-08 DIAGNOSIS — N92.4 EXCESSIVE BLEEDING IN PREMENOPAUSAL PERIOD: Primary | ICD-10-CM

## 2024-07-08 PROCEDURE — 250N000011 HC RX IP 250 OP 636: Performed by: INTERNAL MEDICINE

## 2024-07-08 PROCEDURE — 96365 THER/PROPH/DIAG IV INF INIT: CPT

## 2024-07-08 PROCEDURE — 96376 TX/PRO/DX INJ SAME DRUG ADON: CPT

## 2024-07-08 PROCEDURE — 96366 THER/PROPH/DIAG IV INF ADDON: CPT

## 2024-07-08 PROCEDURE — 258N000003 HC RX IP 258 OP 636: Performed by: INTERNAL MEDICINE

## 2024-07-08 RX ORDER — EPINEPHRINE 1 MG/ML
0.3 INJECTION, SOLUTION INTRAMUSCULAR; SUBCUTANEOUS EVERY 5 MIN PRN
Status: CANCELLED | OUTPATIENT
Start: 2024-07-08

## 2024-07-08 RX ORDER — ALBUTEROL SULFATE 90 UG/1
1-2 AEROSOL, METERED RESPIRATORY (INHALATION)
Status: DISCONTINUED | OUTPATIENT
Start: 2024-07-08 | End: 2024-07-08

## 2024-07-08 RX ORDER — MEPERIDINE HYDROCHLORIDE 25 MG/ML
25 INJECTION INTRAMUSCULAR; INTRAVENOUS; SUBCUTANEOUS EVERY 30 MIN PRN
Status: CANCELLED | OUTPATIENT
Start: 2024-07-08

## 2024-07-08 RX ORDER — EPINEPHRINE 1 MG/ML
0.3 INJECTION, SOLUTION INTRAMUSCULAR; SUBCUTANEOUS EVERY 5 MIN PRN
Status: DISCONTINUED | OUTPATIENT
Start: 2024-07-08 | End: 2024-07-08

## 2024-07-08 RX ORDER — DIPHENHYDRAMINE HYDROCHLORIDE 50 MG/ML
50 INJECTION INTRAMUSCULAR; INTRAVENOUS
Status: CANCELLED
Start: 2024-07-08

## 2024-07-08 RX ORDER — ALBUTEROL SULFATE 0.83 MG/ML
2.5 SOLUTION RESPIRATORY (INHALATION)
Status: DISCONTINUED | OUTPATIENT
Start: 2024-07-08 | End: 2024-07-08

## 2024-07-08 RX ORDER — METHYLPREDNISOLONE SODIUM SUCCINATE 125 MG/2ML
125 INJECTION, POWDER, LYOPHILIZED, FOR SOLUTION INTRAMUSCULAR; INTRAVENOUS
Status: DISCONTINUED | OUTPATIENT
Start: 2024-07-08 | End: 2024-07-08

## 2024-07-08 RX ORDER — HEPARIN SODIUM (PORCINE) LOCK FLUSH IV SOLN 100 UNIT/ML 100 UNIT/ML
5 SOLUTION INTRAVENOUS
OUTPATIENT
Start: 2024-07-08

## 2024-07-08 RX ORDER — ALBUTEROL SULFATE 0.83 MG/ML
2.5 SOLUTION RESPIRATORY (INHALATION)
Status: CANCELLED | OUTPATIENT
Start: 2024-07-08

## 2024-07-08 RX ORDER — HEPARIN SODIUM,PORCINE 10 UNIT/ML
5-20 VIAL (ML) INTRAVENOUS DAILY PRN
OUTPATIENT
Start: 2024-07-08

## 2024-07-08 RX ORDER — MEPERIDINE HYDROCHLORIDE 25 MG/ML
25 INJECTION INTRAMUSCULAR; INTRAVENOUS; SUBCUTANEOUS EVERY 30 MIN PRN
Status: DISCONTINUED | OUTPATIENT
Start: 2024-07-08 | End: 2024-07-08

## 2024-07-08 RX ORDER — METHYLPREDNISOLONE SODIUM SUCCINATE 125 MG/2ML
125 INJECTION, POWDER, LYOPHILIZED, FOR SOLUTION INTRAMUSCULAR; INTRAVENOUS
Status: CANCELLED
Start: 2024-07-08

## 2024-07-08 RX ORDER — DIPHENHYDRAMINE HYDROCHLORIDE 50 MG/ML
50 INJECTION INTRAMUSCULAR; INTRAVENOUS
Status: DISCONTINUED | OUTPATIENT
Start: 2024-07-08 | End: 2024-07-08

## 2024-07-08 RX ORDER — ALBUTEROL SULFATE 90 UG/1
1-2 AEROSOL, METERED RESPIRATORY (INHALATION)
Status: CANCELLED
Start: 2024-07-08

## 2024-07-08 RX ADMIN — SODIUM CHLORIDE 975 MG: 9 INJECTION, SOLUTION INTRAVENOUS at 11:02

## 2024-07-08 RX ADMIN — SODIUM CHLORIDE 25 MG: 9 INJECTION, SOLUTION INTRAVENOUS at 09:33

## 2024-07-08 RX ADMIN — SODIUM CHLORIDE 250 ML: 9 INJECTION, SOLUTION INTRAVENOUS at 09:26

## 2024-07-08 NOTE — PROGRESS NOTES
Infusion Nursing Note:  Kim Solomon presents today for Iron dextran infusion.    Patient seen by provider today: No   present during visit today: Not Applicable.    Note: Kim arrived ambulatory in stable condition. Test dose iron dextran given and tolerated well. BP is elevated today. Her lisinopril dose was recently increased for hypertension. Iron dextran infused as ordered with frequent monitoring of vital signs. Kim denies feeling any side effects at this time. Printed Iron dextran information given to and reviewed with patient. Kim verbalized understanding discharge instructions.      Intravenous Access:  Peripheral IV placed.    Treatment Conditions:  Not Applicable.      Post Infusion Assessment:  Patient tolerated infusion without incident.  Site patent and intact, free from redness, edema or discomfort.  Access discontinued per protocol.       Discharge Plan:   Patient discharged in stable condition accompanied by: self.  Departure Mode: Ambulatory.      Jania Matamoros RN

## 2024-08-28 DIAGNOSIS — F98.8 ATTENTION DEFICIT DISORDER (ADD) WITHOUT HYPERACTIVITY: ICD-10-CM

## 2024-09-03 NOTE — TELEPHONE ENCOUNTER
sertraline (ZOLOFT) 100 MG tablet       Last Written Prescription Date:  1/30/24  Last Fill Quantity: 180,   # refills: 1  Last Office Visit : 6/13/24  Future Office visit:  None    Routing refill request to provider for review/approval because:  SSRIs Protocol [id number]/28/2024 12:39 AM   Protocol Details Medication indicated for associated diagnosis     Lissa MARTIN RN  [facility]/Red Flag Triage & Med Refill Team

## 2024-09-05 RX ORDER — SERTRALINE HYDROCHLORIDE 100 MG/1
200 TABLET, FILM COATED ORAL DAILY
Qty: 180 TABLET | Refills: 3 | Status: SHIPPED | OUTPATIENT
Start: 2024-09-05

## 2024-10-08 ENCOUNTER — OFFICE VISIT (OUTPATIENT)
Dept: FAMILY MEDICINE | Facility: CLINIC | Age: 49
End: 2024-10-08
Payer: COMMERCIAL

## 2024-10-08 VITALS
RESPIRATION RATE: 20 BRPM | TEMPERATURE: 98 F | HEART RATE: 78 BPM | OXYGEN SATURATION: 98 % | DIASTOLIC BLOOD PRESSURE: 83 MMHG | SYSTOLIC BLOOD PRESSURE: 156 MMHG

## 2024-10-08 DIAGNOSIS — N30.00 ACUTE CYSTITIS WITHOUT HEMATURIA: Primary | ICD-10-CM

## 2024-10-08 DIAGNOSIS — R35.0 URINARY FREQUENCY: ICD-10-CM

## 2024-10-08 LAB
ALBUMIN UR-MCNC: 100 MG/DL
APPEARANCE UR: CLEAR
BACTERIA #/AREA URNS HPF: ABNORMAL /HPF
BILIRUB UR QL STRIP: NEGATIVE
CLUE CELLS: NORMAL
COLOR UR AUTO: YELLOW
GLUCOSE UR STRIP-MCNC: NEGATIVE MG/DL
HGB UR QL STRIP: ABNORMAL
KETONES UR STRIP-MCNC: NEGATIVE MG/DL
LEUKOCYTE ESTERASE UR QL STRIP: ABNORMAL
NITRATE UR QL: NEGATIVE
PH UR STRIP: 6 [PH] (ref 5–8)
RBC #/AREA URNS AUTO: ABNORMAL /HPF
SP GR UR STRIP: 1.02 (ref 1–1.03)
SQUAMOUS #/AREA URNS AUTO: ABNORMAL /LPF
TRICHOMONAS, WET PREP: NORMAL
UROBILINOGEN UR STRIP-ACNC: 0.2 E.U./DL
WBC #/AREA URNS AUTO: ABNORMAL /HPF
WBC CLUMPS #/AREA URNS HPF: PRESENT /HPF
WBC'S/HIGH POWER FIELD, WET PREP: NORMAL
YEAST, WET PREP: NORMAL

## 2024-10-08 PROCEDURE — 99213 OFFICE O/P EST LOW 20 MIN: CPT | Performed by: PHYSICIAN ASSISTANT

## 2024-10-08 PROCEDURE — 87210 SMEAR WET MOUNT SALINE/INK: CPT | Performed by: PHYSICIAN ASSISTANT

## 2024-10-08 PROCEDURE — 87086 URINE CULTURE/COLONY COUNT: CPT | Performed by: PHYSICIAN ASSISTANT

## 2024-10-08 PROCEDURE — 81001 URINALYSIS AUTO W/SCOPE: CPT | Performed by: PHYSICIAN ASSISTANT

## 2024-10-08 RX ORDER — NITROFURANTOIN 25; 75 MG/1; MG/1
100 CAPSULE ORAL 2 TIMES DAILY
Qty: 10 CAPSULE | Refills: 0 | Status: SHIPPED | OUTPATIENT
Start: 2024-10-08 | End: 2024-10-13

## 2024-10-08 ASSESSMENT — ENCOUNTER SYMPTOMS
ABDOMINAL PAIN: 0
FLANK PAIN: 0
CHILLS: 0
BACK PAIN: 1
VOMITING: 0
HEMATURIA: 0
FEVER: 0
NAUSEA: 0
DYSURIA: 0
FREQUENCY: 1

## 2024-10-08 NOTE — PROGRESS NOTES
Patient presents with:  Urgent Care: UTI X1 wk   Back pain/frequency urine and discharge       Clinical Decision Making:  Patient experiencing urinary urgency and frequency for the past week.  She is now having some suprapubic abdominal pressure and low back pain.  No prior history of nephrolithiasis.  UA is suspicious for infection.  Patient started on Macrobid for treatment.  No CVA tenderness or severe back or abdominal pain present.      ICD-10-CM    1. Acute cystitis without hematuria  N30.00 nitroFURantoin macrocrystal-monohydrate (MACROBID) 100 MG capsule      2. Urinary frequency  R35.0 UA Macroscopic with reflex to Microscopic and Culture     Wet preparation     UA Microscopic with Reflex to Culture     Urine Culture          Patient Instructions   1. Increase fluid intake  2. Complete antibiotic regimen as prescribed. You will be notified if the treatment plan needs to be changed based on the urine culture results.   3. Follow if you have a fever of 100.4 F (38 C) or higher, no improvement after three days of treatment, trouble urinating because of pain,new or increased discharge from the vagina, rash or joint pain, Increased back or abdominal pain.      HPI:  Kim Solomon is a 49 year old female who presents today with concerns of urinary frequency and urgency over the past week.  Patient denies any urinary pain.  She has had some bilateral lower back pain, but it is worse on the right side.  Patient denies any vaginal irritation or itching, but has had some increased vaginal discharge. Patient has the Mirena. No concerns for STD. No hx of kidney stones. No hx of UTI.  See ROS below    History obtained from the patient.    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)  2007-04: Allergic rhinitis      Past Medical History:   Diagnosis Date    ADD (attention deficit disorder)     on vyvanse, [facility]    Alopecia areata     Anemia     See chart    Asthma     Depression     GERD (gastroesophageal reflux disease)     Impaired fasting blood sugar     Iron deficiency     heavy menses    Obesity     SAADIA (obstructive sleep apnea) 10/2015    mild, AHI  5.5, has mandibular adv device       Social History     Tobacco Use    Smoking status: Never     Passive exposure: Never    Smokeless tobacco: Never   Substance Use Topics    Alcohol use: No     Comment: occasionally         Review of Systems   Constitutional:  Negative for chills and fever.   Gastrointestinal:  Negative for abdominal pain, nausea and vomiting.   Genitourinary:  Positive for frequency. Negative for dysuria, flank pain, hematuria, vaginal discharge and vaginal pain.   Musculoskeletal:  Positive for back pain (lower, bilateral, worse on right).       Vitals:    10/08/24 1613   BP: (!) 156/83   Pulse: 78   Resp: 20   Temp: 98  F (36.7  C)   TempSrc: Tympanic   SpO2: 98%       Physical Exam  Vitals and nursing note reviewed.   Constitutional:       General: She is not in acute distress.     Appearance: She is not ill-appearing.   HENT:      Head: Normocephalic and atraumatic.      Right Ear: External ear normal.      Left Ear: External ear normal.   Eyes:      Conjunctiva/sclera: Conjunctivae normal.   Abdominal:      General: Abdomen is flat. There is no distension.      Tenderness: There is abdominal tenderness in the right lower quadrant and suprapubic area. There is no right CVA tenderness, left CVA tenderness or guarding.      Comments: 3/10 tenderness in right lower abdomen and suprapubic region    Musculoskeletal:        Back:    Neurological:      Mental Status: She is alert.   Psychiatric:         Mood and Affect: Mood normal.         Behavior: Behavior normal.         Thought Content: Thought content normal.         Judgment: Judgment normal.         Results:  Results for orders placed or performed in visit on 10/08/24   UA Macroscopic with reflex to Microscopic and Culture     Status: Abnormal    Specimen: Urine, Clean Catch   Result Value Ref Range    Color Urine Yellow Colorless, Straw, Light Yellow, Yellow    Appearance Urine Clear Clear    Glucose Urine Negative Negative mg/dL    Bilirubin Urine Negative Negative    Ketones Urine Negative Negative mg/dL    Specific Gravity Urine 1.025 1.005 - 1.030    Blood Urine Moderate (A) Negative    pH Urine 6.0 5.0 - 8.0    Protein Albumin Urine 100 (A) Negative mg/dL    Urobilinogen Urine 0.2 0.2, 1.0 E.U./dL    Nitrite Urine Negative Negative    Leukocyte Esterase Urine Small (A) Negative   UA Microscopic with Reflex to Culture     Status: Abnormal   Result Value Ref Range    Bacteria Urine Few (A) None Seen /HPF    RBC Urine 10-25 (A) 0-2 /HPF /HPF    WBC Urine  (A) 0-5 /HPF /HPF    Squamous Epithelials Urine Few (A) None Seen /LPF    WBC Clumps Urine Present (A) None Seen /HPF   Wet preparation     Status: Normal    Specimen: Vagina; Swab   Result Value Ref Range    Trichomonas Absent Absent    Yeast Absent Absent    Clue Cells Absent Absent    WBCs/high power field None None         At the end of the encounter, I discussed results, diagnosis, medications. Discussed red flags for immediate return to clinic/ER, as well as indications for follow up if no improvement. Patient understood and agreed to plan. Patient was stable for discharge.

## 2024-10-09 LAB — BACTERIA UR CULT: NORMAL

## 2025-01-16 ENCOUNTER — OFFICE VISIT (OUTPATIENT)
Dept: URGENT CARE | Facility: URGENT CARE | Age: 50
End: 2025-01-16
Payer: COMMERCIAL

## 2025-01-16 VITALS
DIASTOLIC BLOOD PRESSURE: 88 MMHG | RESPIRATION RATE: 18 BRPM | HEART RATE: 69 BPM | SYSTOLIC BLOOD PRESSURE: 127 MMHG | TEMPERATURE: 98.5 F | OXYGEN SATURATION: 97 %

## 2025-01-16 DIAGNOSIS — J10.1 INFLUENZA A: Primary | ICD-10-CM

## 2025-01-16 NOTE — PROGRESS NOTES
Assessment & Plan     Influenza A  Conservative treatment recommended. Use albuterol inhaler as needed and follow up if ssx persist. Patient understands and is amenable to plan.        Delroy Alvarez is a 49 year old, presenting for the following health issues: Patient on day 6 of illness Started with cough, general malaise, progressed to headache. That evening, she notes a subjective fever, sweats/chills. Sunday, had significant fatigue, was sleeping most of the day, excessive sweating - actually changed the sheets it was so significant. Currently, having headache, light sensitivity. Cough  has persisted - can be dry or productive, SOB with small activity. Tried Mucinex, did not improve. Did an at home flu/COVID test today and it was positive for Flu A. No fever reducing meds today. PMHx significant for asthma - has used albuterol as needed.          1/16/2025     4:55 PM   Additional Questions   Roomed by Bere PIKE   Accompanied by Spouse     HPI       Review of Systems  Constitutional, HEENT, cardiovascular, pulmonary, gi and gu systems are negative, except as otherwise noted.      Objective    /88   Pulse 69   Temp 98.5  F (36.9  C) (Oral)   Resp 18   SpO2 97%   There is no height or weight on file to calculate BMI.  Physical Exam   GENERAL: alert and no distress  HENT: ear canals and TM's normal, nose and mouth without ulcers or lesions  NECK: bilateral anterior cervical adenopathy and no asymmetry, masses, or scars  RESP: lungs clear to auscultation - no rales, rhonchi or wheezes  CV: regular rate and rhythm, normal S1 S2, no S3 or S4, no murmur, click or rub, no peripheral edema  MS: no gross musculoskeletal defects noted, no edema  PSYCH: mentation appears normal, affect normal/bright    No results found for this or any previous visit (from the past 24 hours).        Signed Electronically by: GREGORY [facility] PROVIDER

## 2025-02-14 ENCOUNTER — HOSPITAL ENCOUNTER (OUTPATIENT)
Dept: GENERAL RADIOLOGY | Facility: HOSPITAL | Age: 50
Discharge: HOME | End: 2025-02-14
Attending: FAMILY MEDICINE | Admitting: FAMILY MEDICINE
Payer: COMMERCIAL

## 2025-02-14 PROCEDURE — 73610 X-RAY EXAM OF ANKLE: CPT | Mod: RT

## 2025-02-25 ENCOUNTER — MYC REFILL (OUTPATIENT)
Dept: INTERNAL MEDICINE | Facility: CLINIC | Age: 50
End: 2025-02-25
Payer: COMMERCIAL

## 2025-02-25 DIAGNOSIS — F98.8 ATTENTION DEFICIT DISORDER (ADD) WITHOUT HYPERACTIVITY: ICD-10-CM

## 2025-02-25 DIAGNOSIS — K29.60 OTHER SPECIFIED GASTRITIS, PRESENCE OF BLEEDING UNSPECIFIED, UNSPECIFIED CHRONICITY: Primary | ICD-10-CM

## 2025-02-25 DIAGNOSIS — Z00.00 ROUTINE GENERAL MEDICAL EXAMINATION AT A HEALTH CARE FACILITY: ICD-10-CM

## 2025-03-01 NOTE — TELEPHONE ENCOUNTER
Disp Refills Start End CHANEL   albuterol (PROAIR HFA/PROVENTIL HFA/VENTOLIN HFA) 108 (90 Base) MCG/ACT inhaler 18 g 1 2/2/2023 -- No   Sig - Route: Inhale 2 puffs into the lungs every 4 hours as needed - Inhalation       Cookie Donnelly MD  Internal Medicine   6/13/24  [facility]sc  Nv  none      Refill decision: Medication unable to be refilled by RN due to: gap in med rf. My chart request. Not mentioned in last note to continue.    Request from pharmacy:  Requested Prescriptions   Pending Prescriptions Disp Refills    albuterol (PROAIR HFA/PROVENTIL HFA/VENTOLIN HFA) 108 (90 Base) MCG/ACT inhaler 18 g 1     Sig: Inhale 2 puffs into the lungs every 4 hours as needed.       Short-Acting Beta Agonist Inhalers Protocol  Passed - 3/1/2025 11:39 AM        Passed - Patient is age 12 or older        Passed - Medication is active on med list and the sig matches. RN to manually verify dose and sig if red X/fail.     If the protocol passes (green check), you do not need to verify med dose and sig.    A prescription matches if they are the same clinical intention.    For Example: once daily and every morning are the same.    For all fails (red x), verify dose and sig.    If the refill does match what is on file, the RN can still proceed to approve the refill request.     If they do not match, route to the appropriate provider.             Passed - Recent (12 mo) or future (90 days) visit within the authorizing provider's specialty     The patient must have completed an in-person or virtual visit within the past 12 months or has a future visit scheduled within the next 90 days with the authorizing provider s specialty.  Urgent care and e-visits do not qualify as an office visit for this protocol.

## 2025-03-04 RX ORDER — OMEPRAZOLE 40 MG/1
40 CAPSULE, DELAYED RELEASE ORAL DAILY
Qty: 90 CAPSULE | Refills: 2 | Status: SHIPPED | OUTPATIENT
Start: 2025-03-04

## 2025-03-04 NOTE — TELEPHONE ENCOUNTER
PPI Protocol Failed  Rerun Protocol (3/4/2025 7:13 AM)    Medication indicated for associated diagnosis    The medication is prescribed for one or more of the following conditions:                Erosive esophagitis               Gastritis              Gastric hypersecretion              Gastric ulcer              Gastroesophageal reflux disease              Helicobacter pylori gastrointestinal tract infection              Ulcer of duodenum              Drug-induced peptic ulcer              Esophageal stricture              Gastrointestinal hemorrhage              Indigestion              Stress ulcer              Zollinger-Fitzgerald syndrome              Carmichael s esophagus              Laryngopharyngeal reflux              Epigastric Pain              Morbid Obesity              Cough              History of Peptic Ulcer              Esophageal Atresia, Stenosis and Fistula              Cystic Fibrosis  Bronchiectasis

## 2025-03-04 NOTE — TELEPHONE ENCOUNTER
OMEPRAZOLE DR 40 MG CAPSULE   Last Written Prescription:  1/30/24  #90,3 refills  ----------------------  Last Visit Date: 6/13/24  Future Visit Date: None  ----------------------  Refill decision: Medication unable to be refilled by RN due to: Other:  Request from pharmacy:  Requested Prescriptions   Pending Prescriptions Disp Refills    omeprazole (PRILOSEC) 40 MG DR capsule [Pharmacy Med Name: OMEPRAZOLE DR 40 MG CAPSULE] 90 capsule 3     Sig: TAKE 1 CAPSULE BY MOUTH EVERY DAY       PPI Protocol Failed - 3/4/2025  7:13 AM        Failed - Medication indicated for associated diagnosis     The medication is prescribed for one or more of the following conditions:     Erosive esophagitis    Gastritis   Gastric hypersecretion   Gastric ulcer   Gastroesophageal reflux disease   Helicobacter pylori gastrointestinal tract infection   Ulcer of duodenum   Drug-induced peptic ulcer   Esophageal stricture   Gastrointestinal hemorrhage   Indigestion   Stress ulcer   Zollinger-Fitzgerald syndrome   Carmichael s esophagus   Laryngopharyngeal reflux   Epigastric Pain   Morbid Obesity   Cough   History of Peptic Ulcer   Esophageal Atresia, Stenosis and Fistula   Cystic Fibrosis  Bronchiectasis          Passed - Medication is active on med list and the sig matches. RN to manually verify dose and sig if red X/fail.     If the protocol passes (green check), you do not need to verify med dose and sig.    A prescription matches if they are the same clinical intention.    For Example: once daily and every morning are the same.    For all fails (red x), verify dose and sig.    If the refill does match what is on file, the RN can still proceed to approve the refill request.     If they do not match, route to the appropriate provider.             Passed - Recent (12 mo) or future (90 days) visit within the authorizing provider's specialty     The patient must have completed an in-person or virtual visit within the past 12 months or has a future  visit scheduled within the next 90 days with the authorizing provider s specialty.  Urgent care and e-visits do not qualify as an office visit for this protocol.          Passed - Patient is age 18 or older        Passed - No active pregnacy on record        Passed - No positive pregnancy test in past 12 months

## 2025-03-05 RX ORDER — ALBUTEROL SULFATE 90 UG/1
2 INHALANT RESPIRATORY (INHALATION) EVERY 4 HOURS PRN
Qty: 18 G | Refills: 1 | Status: SHIPPED | OUTPATIENT
Start: 2025-03-05

## 2025-03-05 NOTE — TELEPHONE ENCOUNTER
Requested Renewals     albuterol (PROAIR HFA/PROVENTIL HFA/VENTOLIN HFA) 108 (90 Base) MCG/ACT inhaler         Sig: Inhale 2 puffs into the lungs every 4 hours as needed.    Disp: 18 g    Refills: 1    Start: 2/25/2025    Class: E-Prescribe    For: Attention deficit disorder (ADD) without hyperactivity    To pharmacy: Pharmacy may dispense brand covered by insurance (Proair, or proventil or ventolin or generic albuterol inhaler)    Last ordered: 2 years ago (2/2/2023) by Cookie Donnelly MD    Short-Acting Beta Agonist Inhalers Protocol  [id number]/01/2025 11:44 AM   Protocol Details Patient is age 12 or older    Medication is active on med list and the sig matches. RN to manually verify dose and sig if red X/fail.    Recent (12 mo) or future (90 days) visit within the authorizing provider's specialty      To be filled at: [facility] [id number] [place] [address]

## 2025-06-07 DIAGNOSIS — I10 BENIGN ESSENTIAL HYPERTENSION: ICD-10-CM

## 2025-06-09 RX ORDER — LISINOPRIL 20 MG/1
20 TABLET ORAL DAILY
Qty: 90 TABLET | Refills: 0 | Status: SHIPPED | OUTPATIENT
Start: 2025-06-09

## 2025-06-09 NOTE — TELEPHONE ENCOUNTER
lisinopril (ZESTRIL) 20 MG tablet   Start: 06/13/2024   Disp  90  R  3  Take 1 tablet (20 mg) by mouth daily       Cookie Donnelly MD  Internal Medicine   6/13/24  Nv  none    Refill decision: Medication refilled per  Medication Refill in Ambulatory Care  policy.  Bp > 140/90    Request from pharmacy:  Requested Prescriptions   Pending Prescriptions Disp Refills    lisinopril (ZESTRIL) 20 MG tablet [Pharmacy Med Name: LISINOPRIL 20 MG TABLET] 90 tablet 3     Sig: TAKE 1 TABLET BY MOUTH EVERY DAY       ACE Inhibitors (Including Combos) Protocol Failed - 6/9/2025  1:36 PM        Failed - Most recent blood pressure under 140/90 in past 12 months- Clinicial or Patient Reported     BP Readings from Last 3 Encounters:   02/14/25 (!) 142/88   01/16/25 127/88   10/08/24 (!) 156/83       No data recorded            Passed - Medication is active on med list and the sig matches. RN to manually verify dose and sig if red X/fail.     If the protocol passes (green check), you do not need to verify med dose and sig.    A prescription matches if they are the same clinical intention.    For Example: once daily and every morning are the same.    The protocol can not identify upper and lower case letters as matching and will fail.     For Example: Take 1 tablet (50 mg) by mouth daily     TAKE 1 TABLET (50 MG) BY MOUTH DAILY    For all fails (red x), verify dose and sig.    If the refill does match what is on file, the RN can still proceed to approve the refill request.       If they do not match, route to the appropriate provider.             Passed - Medication indicated for associated diagnosis     Medication is associated with one or more of the following diagnoses:     Chronic Kidney Disease (CKD)   Coronary Artery Disease (CAD)   Diabetes   Heart Failure (HF)   Hypertension (HTN)   Nephropathy   History of myocarditis   Tachycardia induced cardiomyopathy   STEMI (ST elevation myocardial infarction)   Spontaneous dissection of  coronary artery   Status post percutaneous transluminal coronary angioplasty   Cardiomyopathy            Passed - Recent (12 month) or future (90 days) visit with authorizing provider's specialty (provided they have been seen in the past 15 months)     The patient must have completed an in-person or virtual visit within the past 12 months or has a future visit scheduled within the next 90 days with the authorizing provider s specialty.  Urgent care and e-visits do not qualify as an office visit for this protocol.          Passed - Most recent GFR on file in the past 12 months >30        Passed - Patient is age 18 or older        Passed - No active pregnancy on record        Passed - Normal serum potassium on file in past 12 months     Recent Labs   Lab Test 06/13/24  1033   POTASSIUM 3.9             Passed - No positive pregnancy test within past 12 months

## 2025-06-14 ENCOUNTER — HEALTH MAINTENANCE LETTER (OUTPATIENT)
Age: 50
End: 2025-06-14

## 2025-08-16 ENCOUNTER — OFFICE VISIT (OUTPATIENT)
Dept: URGENT CARE | Facility: URGENT CARE | Age: 50
End: 2025-08-16
Payer: COMMERCIAL

## 2025-08-16 VITALS
SYSTOLIC BLOOD PRESSURE: 142 MMHG | RESPIRATION RATE: 18 BRPM | DIASTOLIC BLOOD PRESSURE: 78 MMHG | HEART RATE: 64 BPM | TEMPERATURE: 98 F | OXYGEN SATURATION: 95 % | WEIGHT: 270.5 LBS | BODY MASS INDEX: 43.66 KG/M2

## 2025-08-16 DIAGNOSIS — J01.90 ACUTE SINUSITIS WITH SYMPTOMS > 10 DAYS: Primary | ICD-10-CM

## 2025-08-16 PROCEDURE — 3077F SYST BP >= 140 MM HG: CPT | Performed by: NURSE PRACTITIONER

## 2025-08-16 PROCEDURE — 3078F DIAST BP <80 MM HG: CPT | Performed by: NURSE PRACTITIONER

## 2025-08-16 PROCEDURE — 99213 OFFICE O/P EST LOW 20 MIN: CPT | Performed by: NURSE PRACTITIONER

## 2025-08-16 RX ORDER — DOXYCYCLINE 100 MG/1
100 CAPSULE ORAL 2 TIMES DAILY
Qty: 20 CAPSULE | Refills: 0 | Status: SHIPPED | OUTPATIENT
Start: 2025-08-16 | End: 2025-08-26

## 2025-08-22 DIAGNOSIS — R06.2 WHEEZING: Primary | ICD-10-CM

## 2025-08-22 DIAGNOSIS — F98.8 ATTENTION DEFICIT DISORDER (ADD) WITHOUT HYPERACTIVITY: ICD-10-CM

## 2025-08-26 RX ORDER — ALBUTEROL SULFATE 90 UG/1
2 INHALANT RESPIRATORY (INHALATION) EVERY 4 HOURS PRN
Qty: 18 G | Refills: 1 | Status: SHIPPED | OUTPATIENT
Start: 2025-08-26

## (undated) RX ORDER — FENTANYL CITRATE 50 UG/ML
INJECTION, SOLUTION INTRAMUSCULAR; INTRAVENOUS
Status: DISPENSED
Start: 2024-05-17